# Patient Record
Sex: FEMALE | Race: OTHER
[De-identification: names, ages, dates, MRNs, and addresses within clinical notes are randomized per-mention and may not be internally consistent; named-entity substitution may affect disease eponyms.]

---

## 2017-01-17 PROBLEM — H43.10 VITREOUS HEMORRHAGE: Status: RESOLVED | Noted: 2017-01-17 | Resolved: 2017-01-17

## 2017-01-17 PROBLEM — Z86.711 HISTORY OF PULMONARY EMBOLISM: Status: RESOLVED | Noted: 2017-01-17 | Resolved: 2017-01-17

## 2017-07-17 ENCOUNTER — APPOINTMENT (OUTPATIENT)
Dept: RHEUMATOLOGY | Facility: CLINIC | Age: 47
End: 2017-07-17

## 2017-07-17 VITALS
BODY MASS INDEX: 23.32 KG/M2 | RESPIRATION RATE: 16 BRPM | HEART RATE: 90 BPM | TEMPERATURE: 98.8 F | SYSTOLIC BLOOD PRESSURE: 122 MMHG | WEIGHT: 140 LBS | HEIGHT: 65 IN | OXYGEN SATURATION: 95 % | DIASTOLIC BLOOD PRESSURE: 74 MMHG

## 2017-07-17 DIAGNOSIS — Z82.69 FAMILY HISTORY OF OTHER DISEASES OF THE MUSCULOSKELETAL SYSTEM AND CONNECTIVE TISSUE: ICD-10-CM

## 2017-07-17 DIAGNOSIS — M19.90 UNSPECIFIED OSTEOARTHRITIS, UNSPECIFIED SITE: ICD-10-CM

## 2017-07-17 RX ORDER — ASPIRIN 81 MG/1
81 TABLET ORAL
Refills: 0 | Status: ACTIVE | COMMUNITY
Start: 2017-07-17

## 2017-07-17 RX ORDER — DABIGATRAN ETEXILATE MESYLATE 110 MG/1
CAPSULE ORAL
Refills: 0 | Status: ACTIVE | COMMUNITY

## 2017-07-17 RX ORDER — FOLIC ACID 1 MG/1
1 TABLET ORAL
Refills: 0 | Status: ACTIVE | COMMUNITY
Start: 2017-07-17

## 2017-07-18 LAB
ALBUMIN SERPL ELPH-MCNC: 4.5 G/DL
ALP BLD-CCNC: 101 U/L
ALT SERPL-CCNC: 13 U/L
ANION GAP SERPL CALC-SCNC: 18 MMOL/L
APPEARANCE: CLEAR
AST SERPL-CCNC: 21 U/L
BACTERIA: NEGATIVE
BASOPHILS # BLD AUTO: 0.03 K/UL
BASOPHILS NFR BLD AUTO: 0.5 %
BILIRUB SERPL-MCNC: 1.6 MG/DL
BILIRUBIN URINE: NEGATIVE
BLOOD URINE: ABNORMAL
BUN SERPL-MCNC: 8 MG/DL
CALCIUM SERPL-MCNC: 9.5 MG/DL
CHLORIDE SERPL-SCNC: 104 MMOL/L
CO2 SERPL-SCNC: 22 MMOL/L
COLOR: YELLOW
CREAT SERPL-MCNC: 0.84 MG/DL
CREAT SPEC-SCNC: 136 MG/DL
CREAT/PROT UR: 0.2 RATIO
DSDNA AB SER-ACNC: <12 IU/ML
EOSINOPHIL # BLD AUTO: 0.23 K/UL
EOSINOPHIL NFR BLD AUTO: 3.5 %
GLUCOSE QUALITATIVE U: NORMAL MG/DL
GLUCOSE SERPL-MCNC: 101 MG/DL
HCT VFR BLD CALC: 29.1 %
HGB BLD-MCNC: 10.6 G/DL
HYALINE CASTS: 3 /LPF
IMM GRANULOCYTES NFR BLD AUTO: 0.2 %
KETONES URINE: NEGATIVE
LEUKOCYTE ESTERASE URINE: NEGATIVE
LYMPHOCYTES # BLD AUTO: 2.64 K/UL
LYMPHOCYTES NFR BLD AUTO: 39.8 %
MAN DIFF?: NORMAL
MCHC RBC-ENTMCNC: 33 PG
MCHC RBC-ENTMCNC: 36.4 GM/DL
MCV RBC AUTO: 90.7 FL
MICROSCOPIC-UA: NORMAL
MONOCYTES # BLD AUTO: 0.75 K/UL
MONOCYTES NFR BLD AUTO: 11.3 %
NEUTROPHILS # BLD AUTO: 2.97 K/UL
NEUTROPHILS NFR BLD AUTO: 44.7 %
NITRITE URINE: NEGATIVE
PH URINE: 6
PLATELET # BLD AUTO: 325 K/UL
POTASSIUM SERPL-SCNC: 4.3 MMOL/L
PROT SERPL-MCNC: 8.5 G/DL
PROT UR-MCNC: 26 MG/DL
PROTEIN URINE: 30 MG/DL
RBC # BLD: 3.21 M/UL
RBC # FLD: 14.4 %
RED BLOOD CELLS URINE: 4 /HPF
SODIUM SERPL-SCNC: 144 MMOL/L
SPECIFIC GRAVITY URINE: 1.02
SQUAMOUS EPITHELIAL CELLS: 2 /HPF
UROBILINOGEN URINE: 1 MG/DL
WBC # FLD AUTO: 6.63 K/UL
WHITE BLOOD CELLS URINE: 3 /HPF

## 2017-07-19 LAB
C3 SERPL-MCNC: 112 MG/DL
C4 SERPL-MCNC: 25 MG/DL

## 2017-09-26 ENCOUNTER — APPOINTMENT (OUTPATIENT)
Dept: PLASTIC SURGERY | Facility: CLINIC | Age: 47
End: 2017-09-26

## 2017-11-01 ENCOUNTER — APPOINTMENT (OUTPATIENT)
Dept: RHEUMATOLOGY | Facility: CLINIC | Age: 47
End: 2017-11-01
Payer: MEDICARE

## 2017-11-01 ENCOUNTER — LABORATORY RESULT (OUTPATIENT)
Age: 47
End: 2017-11-01

## 2017-11-01 VITALS
WEIGHT: 150 LBS | HEIGHT: 65 IN | OXYGEN SATURATION: 98 % | HEART RATE: 89 BPM | SYSTOLIC BLOOD PRESSURE: 115 MMHG | DIASTOLIC BLOOD PRESSURE: 72 MMHG | BODY MASS INDEX: 24.99 KG/M2 | TEMPERATURE: 98 F

## 2017-11-01 DIAGNOSIS — Z79.899 OTHER LONG TERM (CURRENT) DRUG THERAPY: ICD-10-CM

## 2017-11-01 PROCEDURE — 99214 OFFICE O/P EST MOD 30 MIN: CPT | Mod: 25

## 2017-11-01 PROCEDURE — 36415 COLL VENOUS BLD VENIPUNCTURE: CPT

## 2017-11-01 RX ORDER — CEPHALEXIN 250 MG/1
250 CAPSULE ORAL
Qty: 28 | Refills: 0 | Status: COMPLETED | COMMUNITY
Start: 2017-10-24

## 2017-11-01 RX ORDER — HYDROXYCHLOROQUINE SULFATE 200 MG/1
200 TABLET, FILM COATED ORAL DAILY
Qty: 180 | Refills: 3 | Status: ACTIVE | COMMUNITY
Start: 2017-07-17 | End: 1900-01-01

## 2017-11-02 LAB
ALBUMIN SERPL ELPH-MCNC: 4.4 G/DL
ALP BLD-CCNC: 119 U/L
ALT SERPL-CCNC: 30 U/L
ANION GAP SERPL CALC-SCNC: 16 MMOL/L
APPEARANCE: CLEAR
AST SERPL-CCNC: 43 U/L
BACTERIA: NEGATIVE
BASOPHILS # BLD AUTO: 0.03 K/UL
BASOPHILS NFR BLD AUTO: 0.4 %
BILIRUB SERPL-MCNC: 1.2 MG/DL
BILIRUBIN URINE: NEGATIVE
BLOOD URINE: NEGATIVE
BUN SERPL-MCNC: 10 MG/DL
C3 SERPL-MCNC: 121 MG/DL
C4 SERPL-MCNC: 26 MG/DL
CALCIUM SERPL-MCNC: 9.6 MG/DL
CHLORIDE SERPL-SCNC: 105 MMOL/L
CO2 SERPL-SCNC: 22 MMOL/L
COLOR: YELLOW
CREAT SERPL-MCNC: 0.76 MG/DL
CREAT SPEC-SCNC: 65 MG/DL
CREAT/PROT UR: 0.2 RATIO
DSDNA AB SER-ACNC: <12 IU/ML
EOSINOPHIL # BLD AUTO: 0.33 K/UL
EOSINOPHIL NFR BLD AUTO: 4.1 %
GLUCOSE QUALITATIVE U: NEGATIVE MG/DL
GLUCOSE SERPL-MCNC: 103 MG/DL
HCT VFR BLD CALC: 28 %
HGB BLD-MCNC: 10.2 G/DL
IMM GRANULOCYTES NFR BLD AUTO: 0.4 %
KETONES URINE: NEGATIVE
LEUKOCYTE ESTERASE URINE: NEGATIVE
LYMPHOCYTES # BLD AUTO: 2.62 K/UL
LYMPHOCYTES NFR BLD AUTO: 32.5 %
MAN DIFF?: NORMAL
MCHC RBC-ENTMCNC: 34.1 PG
MCHC RBC-ENTMCNC: 36.4 GM/DL
MCV RBC AUTO: 93.6 FL
MICROSCOPIC-UA: NORMAL
MONOCYTES # BLD AUTO: 1.01 K/UL
MONOCYTES NFR BLD AUTO: 12.5 %
NEUTROPHILS # BLD AUTO: 4.05 K/UL
NEUTROPHILS NFR BLD AUTO: 50.1 %
NITRITE URINE: NEGATIVE
PH URINE: 7
PLATELET # BLD AUTO: 299 K/UL
POTASSIUM SERPL-SCNC: 4.3 MMOL/L
PROT SERPL-MCNC: 8.5 G/DL
PROT UR-MCNC: 12 MG/DL
PROTEIN URINE: NEGATIVE MG/DL
RBC # BLD: 2.99 M/UL
RBC # FLD: 14.8 %
RED BLOOD CELLS URINE: 2 /HPF
SODIUM SERPL-SCNC: 143 MMOL/L
SPECIFIC GRAVITY URINE: 1.01
SQUAMOUS EPITHELIAL CELLS: 2 /HPF
UROBILINOGEN URINE: 1 MG/DL
WBC # FLD AUTO: 8.07 K/UL
WHITE BLOOD CELLS URINE: 2 /HPF

## 2018-01-25 ENCOUNTER — LABORATORY RESULT (OUTPATIENT)
Age: 48
End: 2018-01-25

## 2018-01-25 ENCOUNTER — APPOINTMENT (OUTPATIENT)
Dept: RHEUMATOLOGY | Facility: CLINIC | Age: 48
End: 2018-01-25
Payer: MEDICARE

## 2018-01-25 VITALS
HEIGHT: 65 IN | TEMPERATURE: 97.6 F | DIASTOLIC BLOOD PRESSURE: 81 MMHG | HEART RATE: 101 BPM | BODY MASS INDEX: 25.49 KG/M2 | SYSTOLIC BLOOD PRESSURE: 134 MMHG | RESPIRATION RATE: 16 BRPM | WEIGHT: 153 LBS | OXYGEN SATURATION: 98 %

## 2018-01-25 PROCEDURE — 36415 COLL VENOUS BLD VENIPUNCTURE: CPT

## 2018-01-25 PROCEDURE — 99215 OFFICE O/P EST HI 40 MIN: CPT | Mod: 25

## 2018-01-30 LAB
ALBUMIN SERPL ELPH-MCNC: 4.3 G/DL
ALP BLD-CCNC: 89 U/L
ALT SERPL-CCNC: 10 U/L
ANA SER IF-ACNC: NEGATIVE
ANION GAP SERPL CALC-SCNC: 15 MMOL/L
APTT BLD: 48.4 SEC
AST SERPL-CCNC: 13 U/L
B2 GLYCOPROT1 IGA SERPL IA-ACNC: <5 SAU
B2 GLYCOPROT1 IGG SER-ACNC: <5 SGU
B2 GLYCOPROT1 IGM SER-ACNC: <5 SMU
B2 MICROGLOB SERPL-MCNC: 1.9 MG/L
BASOPHILS # BLD AUTO: 0.03 K/UL
BASOPHILS NFR BLD AUTO: 0.3 %
BILIRUB SERPL-MCNC: 1.1 MG/DL
BUN SERPL-MCNC: 12 MG/DL
C3 SERPL-MCNC: 133 MG/DL
C4 SERPL-MCNC: 23 MG/DL
CALCIUM SERPL-MCNC: 9.3 MG/DL
CARDIOLIPIN AB SER IA-ACNC: NEGATIVE
CCP AB SER IA-ACNC: <8 UNITS
CH50 SERPL-MCNC: 63 U/ML
CHLORIDE SERPL-SCNC: 103 MMOL/L
CK SERPL-CCNC: 58 U/L
CO2 SERPL-SCNC: 22 MMOL/L
CONFIRM: 59 SEC
CREAT SERPL-MCNC: 0.88 MG/DL
DRVVT 1H NP PPP: 51.8 SEC
DRVVT IMM 1:2 NP PPP: NORMAL
DRVVT SCREEN TO CONFIRM RATIO: 0.88 RATIO
DSDNA AB SER-ACNC: <12 IU/ML
ENA RNP AB SER IA-ACNC: <0.2 AL
ENA SCL70 IGG SER IA-ACNC: <0.2 AL
ENA SM AB SER IA-ACNC: <0.2 AL
ENA SS-A AB SER IA-ACNC: <0.2 AL
ENA SS-B AB SER IA-ACNC: <0.2 AL
EOSINOPHIL # BLD AUTO: 0.52 K/UL
EOSINOPHIL NFR BLD AUTO: 6 %
ERYTHROCYTE [SEDIMENTATION RATE] IN BLOOD BY WESTERGREN METHOD: 19 MM/HR
GLUCOSE SERPL-MCNC: 93 MG/DL
HCT VFR BLD CALC: 30.5 %
HGB BLD-MCNC: 11.2 G/DL
IMM GRANULOCYTES NFR BLD AUTO: 0.3 %
LYMPHOCYTES # BLD AUTO: 3.31 K/UL
LYMPHOCYTES NFR BLD AUTO: 38.5 %
MAN DIFF?: NORMAL
MCHC RBC-ENTMCNC: 34 PG
MCHC RBC-ENTMCNC: 36.7 GM/DL
MCV RBC AUTO: 92.7 FL
MONOCYTES # BLD AUTO: 0.97 K/UL
MONOCYTES NFR BLD AUTO: 11.3 %
MPO AB + PR3 PNL SER: NORMAL
NEUTROPHILS # BLD AUTO: 3.74 K/UL
NEUTROPHILS NFR BLD AUTO: 43.6 %
PLATELET # BLD AUTO: 334 K/UL
POTASSIUM SERPL-SCNC: 4.2 MMOL/L
PROT SERPL-MCNC: 8.7 G/DL
RBC # BLD: 3.29 M/UL
RBC # FLD: 14.2 %
RF+CCP IGG SER-IMP: NEGATIVE
RHEUMATOID FACT SER QL: <7 IU/ML
SCREEN DRVVT: 60.2 SEC
SILICA CLOTTING TIME INTERPRETATION: NORMAL
SILICA CLOTTING TIME S/C: 0.73 RATIO
SODIUM SERPL-SCNC: 140 MMOL/L
T PALLIDUM AB SER QL IA: NEGATIVE
WBC # FLD AUTO: 8.6 K/UL

## 2018-01-31 LAB — HISTONE AB SER QL: 0.7 UNITS

## 2018-02-07 ENCOUNTER — APPOINTMENT (OUTPATIENT)
Dept: RHEUMATOLOGY | Facility: CLINIC | Age: 48
End: 2018-02-07
Payer: MEDICARE

## 2018-02-07 VITALS
WEIGHT: 156 LBS | OXYGEN SATURATION: 99 % | DIASTOLIC BLOOD PRESSURE: 70 MMHG | TEMPERATURE: 97.6 F | BODY MASS INDEX: 25.96 KG/M2 | SYSTOLIC BLOOD PRESSURE: 125 MMHG | HEART RATE: 103 BPM

## 2018-02-07 DIAGNOSIS — M32.9 SYSTEMIC LUPUS ERYTHEMATOSUS, UNSPECIFIED: ICD-10-CM

## 2018-02-07 DIAGNOSIS — Z23 ENCOUNTER FOR IMMUNIZATION: ICD-10-CM

## 2018-02-07 DIAGNOSIS — M12.00 CHRONIC POSTRHEUMATIC ARTHROPATHY [JACCOUD], UNSPECIFIED SITE: ICD-10-CM

## 2018-02-07 PROCEDURE — 99214 OFFICE O/P EST MOD 30 MIN: CPT | Mod: 25

## 2018-02-07 PROCEDURE — G0009: CPT

## 2018-02-07 PROCEDURE — 90670 PCV13 VACCINE IM: CPT

## 2018-03-08 ENCOUNTER — APPOINTMENT (OUTPATIENT)
Dept: RHEUMATOLOGY | Facility: CLINIC | Age: 48
End: 2018-03-08
Payer: MEDICARE

## 2018-03-08 VITALS
DIASTOLIC BLOOD PRESSURE: 89 MMHG | HEIGHT: 65 IN | HEART RATE: 99 BPM | SYSTOLIC BLOOD PRESSURE: 148 MMHG | WEIGHT: 152.13 LBS | BODY MASS INDEX: 25.34 KG/M2 | OXYGEN SATURATION: 98 % | TEMPERATURE: 98.2 F | RESPIRATION RATE: 16 BRPM

## 2018-03-08 DIAGNOSIS — D68.61 ANTIPHOSPHOLIPID SYNDROME: ICD-10-CM

## 2018-03-08 DIAGNOSIS — M15.9 POLYOSTEOARTHRITIS, UNSPECIFIED: ICD-10-CM

## 2018-03-08 PROCEDURE — 99214 OFFICE O/P EST MOD 30 MIN: CPT

## 2018-05-09 ENCOUNTER — APPOINTMENT (OUTPATIENT)
Dept: RHEUMATOLOGY | Facility: CLINIC | Age: 48
End: 2018-05-09

## 2018-06-21 ENCOUNTER — APPOINTMENT (OUTPATIENT)
Dept: RHEUMATOLOGY | Facility: CLINIC | Age: 48
End: 2018-06-21
Payer: MEDICARE

## 2018-06-21 VITALS
WEIGHT: 150 LBS | HEART RATE: 98 BPM | DIASTOLIC BLOOD PRESSURE: 83 MMHG | TEMPERATURE: 98.5 F | RESPIRATION RATE: 16 BRPM | OXYGEN SATURATION: 98 % | HEIGHT: 65 IN | BODY MASS INDEX: 24.99 KG/M2 | SYSTOLIC BLOOD PRESSURE: 129 MMHG

## 2018-06-21 PROCEDURE — 99214 OFFICE O/P EST MOD 30 MIN: CPT

## 2018-11-01 ENCOUNTER — CHART COPY (OUTPATIENT)
Age: 48
End: 2018-11-01

## 2018-11-06 RX ORDER — MYCOPHENOLATE MOFETIL 500 MG/1
500 TABLET ORAL
Qty: 270 | Refills: 3 | Status: ACTIVE | COMMUNITY
Start: 2018-01-25 | End: 1900-01-01

## 2019-01-23 ENCOUNTER — APPOINTMENT (OUTPATIENT)
Dept: RHEUMATOLOGY | Facility: CLINIC | Age: 49
End: 2019-01-23
Payer: MEDICARE

## 2019-01-23 VITALS
WEIGHT: 156 LBS | BODY MASS INDEX: 25.99 KG/M2 | HEART RATE: 85 BPM | TEMPERATURE: 98.6 F | OXYGEN SATURATION: 98 % | DIASTOLIC BLOOD PRESSURE: 73 MMHG | SYSTOLIC BLOOD PRESSURE: 107 MMHG | HEIGHT: 65 IN

## 2019-01-23 PROCEDURE — 99214 OFFICE O/P EST MOD 30 MIN: CPT

## 2019-01-23 NOTE — HISTORY OF PRESENT ILLNESS
[FreeTextEntry1] : Patient arrives for follow up visit. Hx of Lupus. Patient reports of right knee pain and swelling x 3 weeks after taking Kathia class, PCP referred for MRI and US, states will have done either tomorrow or Friday for knee. Mild swelling compared to left knee.Patient states would like to discuss receiving a cortisone shot to right knee. Patient currently reports body aches 7/10 pain, reports complaints of monthly body aches "about once a month" but able to manage pain by resting.\par \par Still on plaquenil 200 bid and Cellcept 1500 mg. Patient still working as , very good response to meds. Never had hair implants scalp.

## 2019-01-23 NOTE — PHYSICAL EXAM
[General Appearance - Alert] : alert [General Appearance - In No Acute Distress] : in no acute distress [Sclera] : the sclera and conjunctiva were normal [PERRL With Normal Accommodation] : pupils were equal in size, round, and reactive to light [Extraocular Movements] : extraocular movements were intact [Oriented To Time, Place, And Person] : oriented to person, place, and time [Impaired Insight] : insight and judgment were intact [Affect] : the affect was normal [FreeTextEntry1] : R KNEE SLI WARM, SLI SWOLLEN, NO FLUID, NO TENDERNESS TO PALPATION.

## 2019-01-23 NOTE — ASSESSMENT
[FreeTextEntry1] : UCTD, with minor occ flares for several hours monthly, rest is all that’s needed for relief\par ? OA, ? meniscus tear R knee\par \par PLAN\par \par Continue meds as is, no change\par Inject R knee if OA on MRI, schedule her in when nurse here.

## 2019-04-03 ENCOUNTER — APPOINTMENT (OUTPATIENT)
Dept: RHEUMATOLOGY | Facility: CLINIC | Age: 49
End: 2019-04-03
Payer: COMMERCIAL

## 2019-04-03 VITALS
OXYGEN SATURATION: 98 % | TEMPERATURE: 98.6 F | BODY MASS INDEX: 25.33 KG/M2 | HEIGHT: 65 IN | DIASTOLIC BLOOD PRESSURE: 79 MMHG | WEIGHT: 152 LBS | SYSTOLIC BLOOD PRESSURE: 119 MMHG | HEART RATE: 94 BPM

## 2019-04-03 PROCEDURE — 99214 OFFICE O/P EST MOD 30 MIN: CPT

## 2019-10-03 ENCOUNTER — APPOINTMENT (OUTPATIENT)
Dept: RHEUMATOLOGY | Facility: CLINIC | Age: 49
End: 2019-10-03
Payer: MEDICARE

## 2019-10-03 VITALS
HEIGHT: 65 IN | OXYGEN SATURATION: 97 % | WEIGHT: 154 LBS | TEMPERATURE: 99.1 F | HEART RATE: 93 BPM | SYSTOLIC BLOOD PRESSURE: 136 MMHG | DIASTOLIC BLOOD PRESSURE: 83 MMHG | BODY MASS INDEX: 25.66 KG/M2

## 2019-10-03 DIAGNOSIS — M35.9 SYSTEMIC INVOLVEMENT OF CONNECTIVE TISSUE, UNSPECIFIED: ICD-10-CM

## 2019-10-03 PROCEDURE — 99214 OFFICE O/P EST MOD 30 MIN: CPT

## 2019-10-03 NOTE — PHYSICAL EXAM
[General Appearance - Alert] : alert [General Appearance - In No Acute Distress] : in no acute distress [Sclera] : the sclera and conjunctiva were normal [PERRL With Normal Accommodation] : pupils were equal in size, round, and reactive to light [Extraocular Movements] : extraocular movements were intact [Skin Color & Pigmentation] : normal skin color and pigmentation [Skin Turgor] : normal skin turgor [] : no rash [Oriented To Time, Place, And Person] : oriented to person, place, and time [Impaired Insight] : insight and judgment were intact [Affect] : the affect was normal [FreeTextEntry1] : KNEES COOL, NO INFLAM. SOME ATROPHY LAT QUAD R.

## 2019-10-03 NOTE — PHYSICAL EXAM
[General Appearance - Alert] : alert [Sclera] : the sclera and conjunctiva were normal [General Appearance - In No Acute Distress] : in no acute distress [PERRL With Normal Accommodation] : pupils were equal in size, round, and reactive to light [Extraocular Movements] : extraocular movements were intact [Skin Color & Pigmentation] : normal skin color and pigmentation [Skin Turgor] : normal skin turgor [] : no rash [Oriented To Time, Place, And Person] : oriented to person, place, and time [Affect] : the affect was normal [Impaired Insight] : insight and judgment were intact [FreeTextEntry1] : KNEES COOL, NO INFLAM. SOME ATROPHY LAT QUAD R.

## 2019-10-03 NOTE — ASSESSMENT
[FreeTextEntry1] : SLE\par OA\par \par PLAN\par \par PT to legs to strengthen LE's.\par Continue meds\par See ophthal. yearly\par See in Fall.\par

## 2019-10-03 NOTE — HISTORY OF PRESENT ILLNESS
[FreeTextEntry1] : SLE and OA R knee with MRI showing OA changes and med and lat horn mensicus tears.\par \par Cellcept 1500mg and Plaquenil 200bid. No real complaints.

## 2020-04-09 ENCOUNTER — APPOINTMENT (OUTPATIENT)
Dept: RHEUMATOLOGY | Facility: CLINIC | Age: 50
End: 2020-04-09

## 2020-10-22 ENCOUNTER — APPOINTMENT (OUTPATIENT)
Dept: OTOLARYNGOLOGY | Facility: CLINIC | Age: 50
End: 2020-10-22
Payer: MEDICARE

## 2020-10-22 VITALS — WEIGHT: 155 LBS | BODY MASS INDEX: 25.83 KG/M2 | HEIGHT: 65 IN

## 2020-10-22 DIAGNOSIS — H90.5 UNSPECIFIED SENSORINEURAL HEARING LOSS: ICD-10-CM

## 2020-10-22 DIAGNOSIS — T16.2XXA FOREIGN BODY IN LEFT EAR, INITIAL ENCOUNTER: ICD-10-CM

## 2020-10-22 PROCEDURE — 92557 COMPREHENSIVE HEARING TEST: CPT

## 2020-10-22 PROCEDURE — 69801 INCISE INNER EAR: CPT

## 2020-10-22 PROCEDURE — 92550 TYMPANOMETRY & REFLEX THRESH: CPT

## 2020-10-22 PROCEDURE — 99204 OFFICE O/P NEW MOD 45 MIN: CPT | Mod: 25

## 2020-10-22 RX ORDER — PREDNISONE 10 MG/1
10 TABLET ORAL
Qty: 120 | Refills: 0 | Status: ACTIVE | COMMUNITY
Start: 2020-10-22 | End: 1900-01-01

## 2020-10-22 NOTE — PROCEDURE
[Sudden Hearing Loss] : Sudden Hearing Loss [Same] : same as the Pre Op Dx. [] : Intratympanic Therapy [FreeTextEntry4] : phenol

## 2020-10-22 NOTE — HISTORY OF PRESENT ILLNESS
[de-identified] : 10/22/2020 Patient presents today c/o ear wax with hearing loss in left ear. Pt went to see PCP Dr Araujo she gave her drops which didn't  help . Pt denies any issue with the ears. Patient states no otalgia but she does feel pressure in the right. Pt States there was some tinnitus but has subsided for now \par \par Patient unsure if lupus has anything to do with hearing loss.

## 2020-10-29 ENCOUNTER — APPOINTMENT (OUTPATIENT)
Dept: OTOLARYNGOLOGY | Facility: CLINIC | Age: 50
End: 2020-10-29

## 2020-11-05 ENCOUNTER — APPOINTMENT (OUTPATIENT)
Dept: OTOLARYNGOLOGY | Facility: CLINIC | Age: 50
End: 2020-11-05

## 2020-12-17 ENCOUNTER — APPOINTMENT (OUTPATIENT)
Dept: OTOLARYNGOLOGY | Facility: CLINIC | Age: 50
End: 2020-12-17

## 2021-09-05 ENCOUNTER — INPATIENT (INPATIENT)
Facility: HOSPITAL | Age: 51
LOS: 10 days | Discharge: HOME | End: 2021-09-16
Attending: INTERNAL MEDICINE | Admitting: INTERNAL MEDICINE
Payer: MEDICARE

## 2021-09-05 VITALS
RESPIRATION RATE: 17 BRPM | OXYGEN SATURATION: 96 % | DIASTOLIC BLOOD PRESSURE: 81 MMHG | TEMPERATURE: 98 F | SYSTOLIC BLOOD PRESSURE: 123 MMHG | HEART RATE: 121 BPM

## 2021-09-05 DIAGNOSIS — D57.1 SICKLE-CELL DISEASE WITHOUT CRISIS: ICD-10-CM

## 2021-09-05 DIAGNOSIS — M54.9 DORSALGIA, UNSPECIFIED: ICD-10-CM

## 2021-09-05 DIAGNOSIS — M32.9 SYSTEMIC LUPUS ERYTHEMATOSUS, UNSPECIFIED: ICD-10-CM

## 2021-09-05 DIAGNOSIS — M54.5 LOW BACK PAIN: ICD-10-CM

## 2021-09-05 DIAGNOSIS — R63.8 OTHER SYMPTOMS AND SIGNS CONCERNING FOOD AND FLUID INTAKE: ICD-10-CM

## 2021-09-05 DIAGNOSIS — Z86.711 PERSONAL HISTORY OF PULMONARY EMBOLISM: ICD-10-CM

## 2021-09-05 DIAGNOSIS — R74.01 ELEVATION OF LEVELS OF LIVER TRANSAMINASE LEVELS: ICD-10-CM

## 2021-09-05 LAB
ALBUMIN SERPL ELPH-MCNC: 4.9 G/DL — SIGNIFICANT CHANGE UP (ref 3.3–5)
ALP SERPL-CCNC: 175 U/L — HIGH (ref 40–120)
ALT FLD-CCNC: 27 U/L — SIGNIFICANT CHANGE UP (ref 4–33)
ANION GAP SERPL CALC-SCNC: 18 MMOL/L — HIGH (ref 7–14)
APPEARANCE UR: CLEAR — SIGNIFICANT CHANGE UP
AST SERPL-CCNC: 64 U/L — HIGH (ref 4–32)
BACTERIA # UR AUTO: NEGATIVE — SIGNIFICANT CHANGE UP
BASOPHILS # BLD AUTO: 0 K/UL — SIGNIFICANT CHANGE UP (ref 0–0.2)
BASOPHILS NFR BLD AUTO: 0 % — SIGNIFICANT CHANGE UP (ref 0–2)
BILIRUB SERPL-MCNC: 2.4 MG/DL — HIGH (ref 0.2–1.2)
BILIRUB UR-MCNC: NEGATIVE — SIGNIFICANT CHANGE UP
BLD GP AB SCN SERPL QL: NEGATIVE — SIGNIFICANT CHANGE UP
BUN SERPL-MCNC: 13 MG/DL — SIGNIFICANT CHANGE UP (ref 7–23)
CALCIUM SERPL-MCNC: 9.8 MG/DL — SIGNIFICANT CHANGE UP (ref 8.4–10.5)
CHLORIDE SERPL-SCNC: 99 MMOL/L — SIGNIFICANT CHANGE UP (ref 98–107)
CO2 SERPL-SCNC: 20 MMOL/L — LOW (ref 22–31)
COLOR SPEC: YELLOW — SIGNIFICANT CHANGE UP
CREAT SERPL-MCNC: 0.82 MG/DL — SIGNIFICANT CHANGE UP (ref 0.5–1.3)
DIFF PNL FLD: NEGATIVE — SIGNIFICANT CHANGE UP
EOSINOPHIL # BLD AUTO: 0 K/UL — SIGNIFICANT CHANGE UP (ref 0–0.5)
EOSINOPHIL NFR BLD AUTO: 0 % — SIGNIFICANT CHANGE UP (ref 0–6)
EPI CELLS # UR: 0 /HPF — SIGNIFICANT CHANGE UP (ref 0–5)
GLUCOSE SERPL-MCNC: 145 MG/DL — HIGH (ref 70–99)
GLUCOSE UR QL: NEGATIVE — SIGNIFICANT CHANGE UP
HCT VFR BLD CALC: 28.8 % — LOW (ref 34.5–45)
HGB BLD-MCNC: 9.6 G/DL — LOW (ref 11.5–15.5)
IANC: 9.65 K/UL — HIGH (ref 1.5–8.5)
KETONES UR-MCNC: ABNORMAL
LEUKOCYTE ESTERASE UR-ACNC: NEGATIVE — SIGNIFICANT CHANGE UP
LYMPHOCYTES # BLD AUTO: 0.5 K/UL — LOW (ref 1–3.3)
LYMPHOCYTES # BLD AUTO: 4.5 % — LOW (ref 13–44)
MACROCYTES BLD QL: SIGNIFICANT CHANGE UP
MANUAL SMEAR VERIFICATION: SIGNIFICANT CHANGE UP
MCHC RBC-ENTMCNC: 30.6 PG — SIGNIFICANT CHANGE UP (ref 27–34)
MCHC RBC-ENTMCNC: 33.3 GM/DL — SIGNIFICANT CHANGE UP (ref 32–36)
MCV RBC AUTO: 91.4 FL — SIGNIFICANT CHANGE UP (ref 80–100)
MONOCYTES # BLD AUTO: 0.5 K/UL — SIGNIFICANT CHANGE UP (ref 0–0.9)
MONOCYTES NFR BLD AUTO: 4.5 % — SIGNIFICANT CHANGE UP (ref 2–14)
MYELOCYTES NFR BLD: 0.9 % — HIGH (ref 0–0)
NEUTROPHILS # BLD AUTO: 9.48 K/UL — HIGH (ref 1.8–7.4)
NEUTROPHILS NFR BLD AUTO: 84.8 % — HIGH (ref 43–77)
NITRITE UR-MCNC: NEGATIVE — SIGNIFICANT CHANGE UP
NRBC # BLD: 29 /100 — HIGH (ref 0–0)
PH UR: 6.5 — SIGNIFICANT CHANGE UP (ref 5–8)
PLAT MORPH BLD: NORMAL — SIGNIFICANT CHANGE UP
PLATELET # BLD AUTO: 302 K/UL — SIGNIFICANT CHANGE UP (ref 150–400)
PLATELET COUNT - ESTIMATE: NORMAL — SIGNIFICANT CHANGE UP
POLYCHROMASIA BLD QL SMEAR: SLIGHT — SIGNIFICANT CHANGE UP
POTASSIUM SERPL-MCNC: 5.4 MMOL/L — HIGH (ref 3.5–5.3)
POTASSIUM SERPL-SCNC: 5.4 MMOL/L — HIGH (ref 3.5–5.3)
PROT SERPL-MCNC: 8.8 G/DL — HIGH (ref 6–8.3)
PROT UR-MCNC: ABNORMAL
RBC # BLD: 3.15 M/UL — LOW (ref 3.8–5.2)
RBC # FLD: 13.8 % — SIGNIFICANT CHANGE UP (ref 10.3–14.5)
RBC BLD AUTO: NORMAL — SIGNIFICANT CHANGE UP
RBC CASTS # UR COMP ASSIST: 1 /HPF — SIGNIFICANT CHANGE UP (ref 0–4)
RH IG SCN BLD-IMP: NEGATIVE — SIGNIFICANT CHANGE UP
SARS-COV-2 RNA SPEC QL NAA+PROBE: SIGNIFICANT CHANGE UP
SODIUM SERPL-SCNC: 137 MMOL/L — SIGNIFICANT CHANGE UP (ref 135–145)
SP GR SPEC: >1.05 (ref 1–1.05)
TARGETS BLD QL SMEAR: SLIGHT — SIGNIFICANT CHANGE UP
UROBILINOGEN FLD QL: ABNORMAL
VARIANT LYMPHS # BLD: 5.3 % — SIGNIFICANT CHANGE UP (ref 0–6)
WBC # BLD: 11.18 K/UL — HIGH (ref 3.8–10.5)
WBC # FLD AUTO: 11.18 K/UL — HIGH (ref 3.8–10.5)
WBC UR QL: 3 /HPF — SIGNIFICANT CHANGE UP (ref 0–5)

## 2021-09-05 PROCEDURE — 99285 EMERGENCY DEPT VISIT HI MDM: CPT

## 2021-09-05 PROCEDURE — 74177 CT ABD & PELVIS W/CONTRAST: CPT | Mod: 26

## 2021-09-05 PROCEDURE — 99223 1ST HOSP IP/OBS HIGH 75: CPT

## 2021-09-05 PROCEDURE — 72132 CT LUMBAR SPINE W/DYE: CPT | Mod: 26

## 2021-09-05 RX ORDER — ONDANSETRON 8 MG/1
4 TABLET, FILM COATED ORAL EVERY 8 HOURS
Refills: 0 | Status: DISCONTINUED | OUTPATIENT
Start: 2021-09-05 | End: 2021-09-16

## 2021-09-05 RX ORDER — LANOLIN ALCOHOL/MO/W.PET/CERES
3 CREAM (GRAM) TOPICAL AT BEDTIME
Refills: 0 | Status: DISCONTINUED | OUTPATIENT
Start: 2021-09-05 | End: 2021-09-16

## 2021-09-05 RX ORDER — MYCOPHENOLIC ACID 180 MG/1
0 TABLET, DELAYED RELEASE ORAL
Qty: 0 | Refills: 0 | DISCHARGE

## 2021-09-05 RX ORDER — LIDOCAINE 4 G/100G
1 CREAM TOPICAL ONCE
Refills: 0 | Status: COMPLETED | OUTPATIENT
Start: 2021-09-05 | End: 2021-09-05

## 2021-09-05 RX ORDER — ACETAMINOPHEN 500 MG
650 TABLET ORAL EVERY 6 HOURS
Refills: 0 | Status: DISCONTINUED | OUTPATIENT
Start: 2021-09-05 | End: 2021-09-07

## 2021-09-05 RX ORDER — SODIUM CHLORIDE 9 MG/ML
1000 INJECTION INTRAMUSCULAR; INTRAVENOUS; SUBCUTANEOUS
Refills: 0 | Status: DISCONTINUED | OUTPATIENT
Start: 2021-09-05 | End: 2021-09-10

## 2021-09-05 RX ORDER — LIDOCAINE 4 G/100G
1 CREAM TOPICAL DAILY
Refills: 0 | Status: DISCONTINUED | OUTPATIENT
Start: 2021-09-05 | End: 2021-09-16

## 2021-09-05 RX ORDER — KETOROLAC TROMETHAMINE 30 MG/ML
15 SYRINGE (ML) INJECTION ONCE
Refills: 0 | Status: DISCONTINUED | OUTPATIENT
Start: 2021-09-05 | End: 2021-09-05

## 2021-09-05 RX ORDER — SODIUM CHLORIDE 9 MG/ML
1000 INJECTION INTRAMUSCULAR; INTRAVENOUS; SUBCUTANEOUS ONCE
Refills: 0 | Status: COMPLETED | OUTPATIENT
Start: 2021-09-05 | End: 2021-09-05

## 2021-09-05 RX ORDER — ACETAMINOPHEN 500 MG
650 TABLET ORAL ONCE
Refills: 0 | Status: COMPLETED | OUTPATIENT
Start: 2021-09-05 | End: 2021-09-05

## 2021-09-05 RX ADMIN — Medication 650 MILLIGRAM(S): at 13:02

## 2021-09-05 RX ADMIN — Medication 15 MILLIGRAM(S): at 17:18

## 2021-09-05 RX ADMIN — SODIUM CHLORIDE 1000 MILLILITER(S): 9 INJECTION INTRAMUSCULAR; INTRAVENOUS; SUBCUTANEOUS at 13:50

## 2021-09-05 RX ADMIN — LIDOCAINE 1 PATCH: 4 CREAM TOPICAL at 18:05

## 2021-09-05 NOTE — H&P ADULT - PROBLEM SELECTOR PLAN 3
New  No record to compare to  s/p cholecystectomy  Tbili 2.4, , AST 64, ALT 24 New  No record to compare to patient reports chronically elevated  s/p cholecystectomy  Tbili 2.4, , AST 64, ALT 24  Will repeat to assure not trending up

## 2021-09-05 NOTE — ED PROVIDER NOTE - NS ED ROS FT
CONST: no fevers, no chills  EYES: no pain, no vision changes  ENT: no sore throat, no ear pain, no change in hearing  CV: no chest pain, no leg swelling  RESP: no shortness of breath, no cough  ABD: Pos abdominal pain localized to the sides. no nausea, no vomiting, no diarrhea  : no dysuria, no flank pain, no hematuria. Pos decreased urine output.  MSK: Pos lower back pain  NEURO: no headache or additional neurologic complaints  SKIN:  no rash

## 2021-09-05 NOTE — H&P ADULT - NSHPPHYSICALEXAM_GEN_ALL_CORE
PHYSICAL EXAM:  GENERAL: NAD, well-developed, well-nourished  HEAD:  Atraumatic, Normocephalic  EYES: EOMI, PERRL, conjunctiva and sclera clear  NECK: Supple, No JVD  CHEST/LUNG: Clear to auscultation bilaterally; No wheezes, rales or rhonchi  HEART: Regular rate and rhythm; No murmurs, rubs, or gallops, (+)S1, S2  ABDOMEN: Soft, Nontender, Nondistended; Normal Bowel sounds   EXTREMITIES:  2+ Peripheral Pulses, No clubbing, cyanosis, or edema  PSYCH: normal mood and affect  NEUROLOGY: AAOx3, non-focal  MSK: SLR >60 bilaterally with no pain, sacroilliac tenderness  SKIN: No rashes or lesions

## 2021-09-05 NOTE — H&P ADULT - PROBLEM SELECTOR PLAN 4
Chronic stable  Continue cellcept and prednisone 5mg daily Chronic stable  Continue cellcept and prednisone 5mg daily following confirmation of dosing

## 2021-09-05 NOTE — ED ADULT TRIAGE NOTE - CHIEF COMPLAINT QUOTE
Pt c/o lower back pian and urinary retention. Pt last urinated last night. PMH Lupus, PE on Coumadin, Sickle Cell Disease. Pt tachycardic in triage, c/o mild cp

## 2021-09-05 NOTE — ED PROVIDER NOTE - OBJECTIVE STATEMENT
50 yo female pt PMH Lupus, SC, hx of PEs; who complains of lower back pain and poor urine output. Lower back pain present since 1 week ago and has been worsening since. Patient refers poor urine output since yesterday. Denies chest pain, SOB, n/v, diarrhea, abd pain, fevers, . Pos for confusion/feeling disoriented and weight loss s/p cholecystectomy in july. No urine symptoms such as dysuria or frequency. No loss of sensation, numbness or tingling. 50 yo female pt PMH Lupus, SC, hx of PEs; who complains of lower back pain and poor urine output secondary to dehydration. Lower back pain present since 1 week ago and has been worsening since. Patient refers poor urine output since yesterday. Denies chest pain, SOB, n/v, diarrhea, abd pain, fevers, . Pos for confusion/feeling disoriented and weight loss s/p cholecystectomy in july. No urine symptoms such as dysuria or frequency. No loss of sensation, numbness or tingling.

## 2021-09-05 NOTE — ED PROVIDER NOTE - ATTENDING CONTRIBUTION TO CARE
I have personally performed a face to face bedside history and physical examination of this patient. I have discussed the history, examination, review of systems, assessment and plan of management with the resident. I have reviewed the electronic medical record and amended it to reflect my history, review of systems, physical exam, assessment and plan.    Brief HPI:  50 yo F pt PMH Lupus, SC, hx of PEs; who complains of lower back pain and poor urine output secondary to dehydration.  Patient states she developed lumbar back pain ~1 week ago.  It is non-radiating, not associated with numbness, weakness, tingling.  Also with lower abdominal pain.  States she is having decreased urine output, no urge no urinate.  Normal bm's.  Denies fever, chills, trauma, pelvic sensory changes, recent spinal instrumentation.      Vitals:   Reviewed    Exam:    GEN:  Non-toxic appearing, non-distressed, speaking full sentences, non-diaphoretic, AAOx3  HEENT:  NCAT, neck supple, EOMI, PERRLA, sclera anicteric, no conjunctival pallor or injection, no stridor, normal voice, no tonsillar exudate, uvula midline,  CV:  regular rhythm and rate, s1/s2 audible, no murmurs, rubs or gallops, peripheral pulses 2+ and symmetric  PULM:  non-labored respirations, lungs clear to auscultation bilaterally, no wheezes, crackles or rales  ABD:  non distended, mild diffuse tenderness, no rebound, no guarding, negative Sanford's sign, bowel sounds normal, no cvat  MSK:  no gross deformity, non-tender extremities and joints, range of motion grossly normal appearing, no extremity edema, extremities warm and well perfused   NEURO:  AAOx3, CN II-XII intact, motor 5/5 in upper and lower extremities bilaterally, sensation grossly intact in extremities and trunk, finger to nose testing wnl, no nystagmus, negative Romberg, no pronator drift, shuffling gait   SPINE:  b/l SI joint tenderness, no midline c/t/l spine tenderness   SKIN:  warm, dry, no rash or vesicles     A/P:  50 yo F pt PMH Lupus, SC, hx of PEs; who complains of lower back pain and poor urine output secondary.  Low concern for spinal epidural abscess, transverse myelitis, acute cord compression, or cauda equina syndrome at this time.  The patient possesses no red flags including fever, chills, history of malignancy, history of intravenous drug use, recent spinal instrumentation, predominant nocturnal pain, history of immunosuppression, pelvic or perineal anesthesia or paresthesia, or fecal or urinary incontinence or retention.  Despite reported decreased urination, there is no evidence of retention on exam such as suprapubic tenderness or distention.  Possible infection or inflammatory condition of abdomen, uti, pyelo, sacroiliitis.  Plan for labs, ct, supportive care.  Dispo pending.

## 2021-09-05 NOTE — ED PROVIDER NOTE - PHYSICAL EXAMINATION
GENERAL: Awake, alert, in acute pain  HEENT: NC/AT, moist mucous membranes  LUNGS: CTAB, no wheezes or crackles   CARDIAC: RRR, no m/r/g  ABDOMEN: Soft, normal BS, non tender, non distended, no rebound, no guarding  BACK: midline and paraspinal lower back tenderness. No stepoffs. Pos leg raise bilaterally.   EXT: No edema, no calf tenderness, 2+ DP pulses bilaterally, no deformities.  NEURO: A&Ox3. Moving all extremities. ROM of lower ext limited due to pain  SKIN: Warm and dry. GENERAL: Awake, alert, in acute pain  HEENT: NC/AT, moist mucous membranes  LUNGS: CTAB, no wheezes or crackles   CARDIAC: RRR, no m/r/g  ABDOMEN: Soft, normal BS, non tender, non distended, no rebound, no guarding  BACK: no midline tenderness. lower lumbar/sacral paraspinal lower back tenderness. No stepoffs. Pos leg raise bilaterally.   EXT: No edema, no calf tenderness, 2+ DP pulses bilaterally, no deformities.  NEURO: A&Ox3. Moving all extremities. ROM of lower ext limited due to pain  SKIN: Warm and dry.

## 2021-09-05 NOTE — H&P ADULT - HISTORY OF PRESENT ILLNESS
51 yr old female with a pmh of lupus, SCD, hx of PE      Denies  headache, dizziness, chest pain, palpitations, SOB, abdominal pain, joint pain, diarrhea/constipation, urinary symptoms.   Vitals in the ED Tmax 99.1, , /81, RR 17 satting 100% RA 51 yr old female with a pmh of lupus, SCD, hx of PE on warfarin who presents with a one week history of lower back pain that is constant and worsening. Sitting still makes the pain better. Otherwise describes it as 20/10. Hurts to move her legs with active ROM but passive ROM ellicits no pain. Denies fecal or urinary incontinence Denies leg weakness. Also reports since cholecystectomy in July has not really felt like eating.      Denies  headache, dizziness, chest pain, palpitations, SOB, abdominal pain, joint pain, diarrhea/constipation, urinary symptoms.   Vitals in the ED Tmax 99.1, , /81, RR 17 satting 100% RA

## 2021-09-05 NOTE — H&P ADULT - PROBLEM SELECTOR PLAN 1
New  1 week history of back pain  CT imaging negative  PT exercises as outpatient   Consider MRI if no improvement  Lidocaine patches and Tylenol for pain

## 2021-09-05 NOTE — H&P ADULT - NSHPLABSRESULTS_GEN_ALL_CORE
9.6    11.18 )-----------( 302      ( 05 Sep 2021 14:19 )             28.8       09-05    137  |  99  |  13  ----------------------------<  145<H>  5.4<H>   |  20<L>  |  0.82    Ca    9.8      05 Sep 2021 14:19    TPro  8.8<H>  /  Alb  4.9  /  TBili  2.4<H>  /  DBili  x   /  AST  64<H>  /  ALT  27  /  AlkPhos  175<H>  09-05        Urinalysis Basic - ( 05 Sep 2021 19:00 )    Color: Yellow / Appearance: Clear / SG: >1.050 / pH: x  Gluc: x / Ketone: Trace  / Bili: Negative / Urobili: 3 mg/dL   Blood: x / Protein: Trace / Nitrite: Negative   Leuk Esterase: Negative / RBC: 1 /HPF / WBC 3 /HPF   Sq Epi: x / Non Sq Epi: 0 /HPF / Bacteria: Negative    interpreted by radiology:  CT lumbar spine: Unremarkable CT of the lumbar spine. No fractures, lytic or blastic lesions.  CT abdo/pelvis: No acute findings within the abdomen or pelvis. s/p cholecystectomy 9.6    11.18 )-----------( 302      ( 05 Sep 2021 14:19 )             28.8       09-05    137  |  99  |  13  ----------------------------<  145<H>  5.4<H>   |  20<L>  |  0.82    Ca    9.8      05 Sep 2021 14:19    TPro  8.8<H>  /  Alb  4.9  /  TBili  2.4<H>  /  DBili  x   /  AST  64<H>  /  ALT  27  /  AlkPhos  175<H>  09-05        Urinalysis Basic - ( 05 Sep 2021 19:00 )    Color: Yellow / Appearance: Clear / SG: >1.050 / pH: x  Gluc: x / Ketone: Trace  / Bili: Negative / Urobili: 3 mg/dL   Blood: x / Protein: Trace / Nitrite: Negative   Leuk Esterase: Negative / RBC: 1 /HPF / WBC 3 /HPF   Sq Epi: x / Non Sq Epi: 0 /HPF / Bacteria: Negative    interpreted by radiology:  CT lumbar spine: Unremarkable CT of the lumbar spine. No fractures, lytic or blastic lesions.  CT abdo/pelvis: No acute findings within the abdomen or pelvis. s/p cholecystectomy    EKG interpreted by myself: sinus tach rate ~100

## 2021-09-05 NOTE — H&P ADULT - NSHPREVIEWOFSYSTEMS_GEN_ALL_CORE
REVIEW OF SYSTEMS:    CONSTITUTIONAL: No weakness, fevers or chills, decreased PO intake  EYES/ENT: No visual changes;  No dysphagia; No sore throat; No rhinorrhea; No sinus pain/pressure  NECK: No pain or stiffness  RESPIRATORY: No cough, wheezing, hemoptysis; No shortness of breath  CARDIOVASCULAR: No chest pain or palpitations; No lower extremity edema  GASTROINTESTINAL: No abdominal or epigastric pain. No nausea, vomiting, or hematemesis; No diarrhea or constipation. No melena or hematochezia.  GENITOURINARY: No dysuria, frequency or hematuria  NEUROLOGICAL: No numbness or weakness  MSK: ambulates independently, +back pain  SKIN: No itching, burning, rashes, or lesions   All other review of systems is negative unless indicated above.

## 2021-09-05 NOTE — ED PROVIDER NOTE - CLINICAL SUMMARY MEDICAL DECISION MAKING FREE TEXT BOX
50 yo female pt w PMH lupus, SC; who complains of lower back pain, abdominal BL side pain, and decreased urine output. Exam found w back tenderness, pos leg raise, decreased urine. COncerned for urinary retention secondary to cord compression; no BM changes. Will order labs, imaging, and medication for pain. 50 yo female pt w PMH lupus, SC; who complains of lower back pain, abdominal BL side pain, and decreased urine output. Exam found w back tenderness, pos leg raise, decreased urine. COncerned for urinary retention but not clinically concerned for cord compression; no BM changes. Will order labs, imaging, and medication for pain. 52 yo female pt w PMH lupus, SC; who complains of lower back pain, abdominal BL side pain, and decreased urine output. Exam found w back tenderness, pos leg raise, decreased urine. Will order labs, imaging, and medication for pain. Will reassess

## 2021-09-05 NOTE — ED PROVIDER NOTE - PROGRESS NOTE DETAILS
bladder US done bedside and found w no urinary retention Pt reassessed after CT results.  She has continued pain, but is now urinating without any difficulties after IV hudration.  Pt has no midline vertebral ttp, but TTP bilaterally at SI joints.  Toradol given, lidocaine patch ordered.  Pt required significant assistance to take a few steps into wheel chair to use restroom.  Pt reports weakness in legs secondary to back pain.  Not concerned for cord compression but patient's pain is not well controlled and she cannot walk well or care for herself at home.  Pt will likely be admitted.  - Asuncion Ruano,

## 2021-09-05 NOTE — ED ADULT NURSE NOTE - OBJECTIVE STATEMENT
Pt received to room 25. Pt with PMH of lupus, sickle cell disease and PE's (on coumadin) presents to ED c/o lower back pain & pelvic pain. Reports her pelvic pain started on Saturday 8/28 but then "subsided after a few days." Reports that pain has returned and is now "worse than before." Pt appears uncomfortable and in pain. Denies dysuria, hematuria, N/V/D, fevers, chills, weakness, dizziness, headache, palpitations, cough, chest pain, shortness of breath. Respirations are even & unlabored, lung sounds clear, heart sounds normal, abdomen is soft, nondistended. Pt has tenderness to abdomen upon palpation. 20 G IV placed to left AC. Pt is A&Ox4, ambulates independently. Side rails up for safety, bed in lowest position, call bell within reach.

## 2021-09-06 LAB
A1C WITH ESTIMATED AVERAGE GLUCOSE RESULT: 4.2 % — SIGNIFICANT CHANGE UP (ref 4–5.6)
ALBUMIN SERPL ELPH-MCNC: 4.1 G/DL — SIGNIFICANT CHANGE UP (ref 3.3–5)
ALP SERPL-CCNC: 150 U/L — HIGH (ref 40–120)
ALT FLD-CCNC: 16 U/L — SIGNIFICANT CHANGE UP (ref 4–33)
ANION GAP SERPL CALC-SCNC: 13 MMOL/L — SIGNIFICANT CHANGE UP (ref 7–14)
APTT BLD: 151.5 SEC — CRITICAL HIGH (ref 27–36.3)
APTT BLD: 25.4 SEC — LOW (ref 27–36.3)
AST SERPL-CCNC: 16 U/L — SIGNIFICANT CHANGE UP (ref 4–32)
BASOPHILS # BLD AUTO: 0.03 K/UL — SIGNIFICANT CHANGE UP (ref 0–0.2)
BASOPHILS NFR BLD AUTO: 0.3 % — SIGNIFICANT CHANGE UP (ref 0–2)
BILIRUB SERPL-MCNC: 1.6 MG/DL — HIGH (ref 0.2–1.2)
BUN SERPL-MCNC: 12 MG/DL — SIGNIFICANT CHANGE UP (ref 7–23)
CALCIUM SERPL-MCNC: 9.5 MG/DL — SIGNIFICANT CHANGE UP (ref 8.4–10.5)
CHLORIDE SERPL-SCNC: 104 MMOL/L — SIGNIFICANT CHANGE UP (ref 98–107)
CO2 SERPL-SCNC: 23 MMOL/L — SIGNIFICANT CHANGE UP (ref 22–31)
COVID-19 SPIKE DOMAIN AB INTERP: POSITIVE
COVID-19 SPIKE DOMAIN ANTIBODY RESULT: >250 U/ML — HIGH
CREAT SERPL-MCNC: 0.76 MG/DL — SIGNIFICANT CHANGE UP (ref 0.5–1.3)
EOSINOPHIL # BLD AUTO: 0.16 K/UL — SIGNIFICANT CHANGE UP (ref 0–0.5)
EOSINOPHIL NFR BLD AUTO: 1.4 % — SIGNIFICANT CHANGE UP (ref 0–6)
ESTIMATED AVERAGE GLUCOSE: 74 — SIGNIFICANT CHANGE UP
GLUCOSE SERPL-MCNC: 100 MG/DL — HIGH (ref 70–99)
HCT VFR BLD CALC: 21.5 % — LOW (ref 34.5–45)
HCT VFR BLD CALC: 25 % — LOW (ref 34.5–45)
HGB BLD-MCNC: 7.2 G/DL — LOW (ref 11.5–15.5)
HGB BLD-MCNC: 9.1 G/DL — LOW (ref 11.5–15.5)
IANC: 5.89 K/UL — SIGNIFICANT CHANGE UP (ref 1.5–8.5)
IMM GRANULOCYTES NFR BLD AUTO: 1 % — SIGNIFICANT CHANGE UP (ref 0–1.5)
INR BLD: 1.27 RATIO — HIGH (ref 0.88–1.16)
LYMPHOCYTES # BLD AUTO: 38.2 % — SIGNIFICANT CHANGE UP (ref 13–44)
LYMPHOCYTES # BLD AUTO: 4.49 K/UL — HIGH (ref 1–3.3)
MCHC RBC-ENTMCNC: 30.8 PG — SIGNIFICANT CHANGE UP (ref 27–34)
MCHC RBC-ENTMCNC: 31.5 PG — SIGNIFICANT CHANGE UP (ref 27–34)
MCHC RBC-ENTMCNC: 33.4 GM/DL — SIGNIFICANT CHANGE UP (ref 32–36)
MCHC RBC-ENTMCNC: 36.4 GM/DL — HIGH (ref 32–36)
MCV RBC AUTO: 91.9 FL — SIGNIFICANT CHANGE UP (ref 80–100)
MCV RBC AUTO: 94 FL — SIGNIFICANT CHANGE UP (ref 80–100)
MONOCYTES # BLD AUTO: 1.06 K/UL — HIGH (ref 0–0.9)
MONOCYTES NFR BLD AUTO: 9 % — SIGNIFICANT CHANGE UP (ref 2–14)
NEUTROPHILS # BLD AUTO: 5.89 K/UL — SIGNIFICANT CHANGE UP (ref 1.8–7.4)
NEUTROPHILS NFR BLD AUTO: 50.1 % — SIGNIFICANT CHANGE UP (ref 43–77)
NRBC # BLD: 6 /100 WBCS — SIGNIFICANT CHANGE UP
NRBC # BLD: 9 /100 WBCS — SIGNIFICANT CHANGE UP
NRBC # FLD: 0.72 K/UL — HIGH
NRBC # FLD: 1.02 K/UL — HIGH
PLATELET # BLD AUTO: 249 K/UL — SIGNIFICANT CHANGE UP (ref 150–400)
PLATELET # BLD AUTO: 262 K/UL — SIGNIFICANT CHANGE UP (ref 150–400)
POTASSIUM SERPL-MCNC: 3.5 MMOL/L — SIGNIFICANT CHANGE UP (ref 3.5–5.3)
POTASSIUM SERPL-SCNC: 3.5 MMOL/L — SIGNIFICANT CHANGE UP (ref 3.5–5.3)
PROT SERPL-MCNC: 7.2 G/DL — SIGNIFICANT CHANGE UP (ref 6–8.3)
PROTHROM AB SERPL-ACNC: 14.3 SEC — HIGH (ref 10.6–13.6)
RBC # BLD: 2.34 M/UL — LOW (ref 3.8–5.2)
RBC # BLD: 2.64 M/UL — LOW (ref 3.8–5.2)
RBC # BLD: 2.64 M/UL — LOW (ref 3.8–5.2)
RBC # FLD: 13.6 % — SIGNIFICANT CHANGE UP (ref 10.3–14.5)
RBC # FLD: 13.9 % — SIGNIFICANT CHANGE UP (ref 10.3–14.5)
RETICS #: 151 K/UL — HIGH (ref 25–125)
RETICS/RBC NFR: 5.8 % — HIGH (ref 0.5–2.5)
SARS-COV-2 IGG+IGM SERPL QL IA: >250 U/ML — HIGH
SARS-COV-2 IGG+IGM SERPL QL IA: POSITIVE
SODIUM SERPL-SCNC: 140 MMOL/L — SIGNIFICANT CHANGE UP (ref 135–145)
WBC # BLD: 11 K/UL — HIGH (ref 3.8–10.5)
WBC # BLD: 11.75 K/UL — HIGH (ref 3.8–10.5)
WBC # FLD AUTO: 11 K/UL — HIGH (ref 3.8–10.5)
WBC # FLD AUTO: 11.75 K/UL — HIGH (ref 3.8–10.5)

## 2021-09-06 RX ORDER — HEPARIN SODIUM 5000 [USP'U]/ML
2500 INJECTION INTRAVENOUS; SUBCUTANEOUS EVERY 6 HOURS
Refills: 0 | Status: DISCONTINUED | OUTPATIENT
Start: 2021-09-06 | End: 2021-09-10

## 2021-09-06 RX ORDER — MYCOPHENOLATE MOFETIL 250 MG/1
1000 CAPSULE ORAL
Refills: 0 | Status: DISCONTINUED | OUTPATIENT
Start: 2021-09-06 | End: 2021-09-16

## 2021-09-06 RX ORDER — HEPARIN SODIUM 5000 [USP'U]/ML
INJECTION INTRAVENOUS; SUBCUTANEOUS
Qty: 25000 | Refills: 0 | Status: DISCONTINUED | OUTPATIENT
Start: 2021-09-06 | End: 2021-09-10

## 2021-09-06 RX ORDER — WARFARIN SODIUM 2.5 MG/1
0 TABLET ORAL
Qty: 0 | Refills: 0 | DISCHARGE

## 2021-09-06 RX ORDER — HEPARIN SODIUM 5000 [USP'U]/ML
5000 INJECTION INTRAVENOUS; SUBCUTANEOUS ONCE
Refills: 0 | Status: COMPLETED | OUTPATIENT
Start: 2021-09-06 | End: 2021-09-06

## 2021-09-06 RX ORDER — MYCOPHENOLIC ACID 180 MG/1
500 TABLET, DELAYED RELEASE ORAL
Qty: 0 | Refills: 0 | DISCHARGE

## 2021-09-06 RX ORDER — HEPARIN SODIUM 5000 [USP'U]/ML
5000 INJECTION INTRAVENOUS; SUBCUTANEOUS EVERY 6 HOURS
Refills: 0 | Status: DISCONTINUED | OUTPATIENT
Start: 2021-09-06 | End: 2021-09-10

## 2021-09-06 RX ORDER — WARFARIN SODIUM 2.5 MG/1
7.5 TABLET ORAL ONCE
Refills: 0 | Status: COMPLETED | OUTPATIENT
Start: 2021-09-06 | End: 2021-09-06

## 2021-09-06 RX ORDER — KETOROLAC TROMETHAMINE 30 MG/ML
15 SYRINGE (ML) INJECTION ONCE
Refills: 0 | Status: DISCONTINUED | OUTPATIENT
Start: 2021-09-06 | End: 2021-09-06

## 2021-09-06 RX ADMIN — HEPARIN SODIUM 1100 UNIT(S)/HR: 5000 INJECTION INTRAVENOUS; SUBCUTANEOUS at 14:20

## 2021-09-06 RX ADMIN — Medication 5 MILLIGRAM(S): at 06:19

## 2021-09-06 RX ADMIN — LIDOCAINE 1 PATCH: 4 CREAM TOPICAL at 11:09

## 2021-09-06 RX ADMIN — SODIUM CHLORIDE 100 MILLILITER(S): 9 INJECTION INTRAMUSCULAR; INTRAVENOUS; SUBCUTANEOUS at 04:00

## 2021-09-06 RX ADMIN — LIDOCAINE 1 PATCH: 4 CREAM TOPICAL at 23:00

## 2021-09-06 RX ADMIN — Medication 15 MILLIGRAM(S): at 11:25

## 2021-09-06 RX ADMIN — HEPARIN SODIUM 5000 UNIT(S): 5000 INJECTION INTRAVENOUS; SUBCUTANEOUS at 14:21

## 2021-09-06 RX ADMIN — Medication 15 MILLIGRAM(S): at 11:09

## 2021-09-06 RX ADMIN — HEPARIN SODIUM 0 UNIT(S)/HR: 5000 INJECTION INTRAVENOUS; SUBCUTANEOUS at 22:07

## 2021-09-06 RX ADMIN — HEPARIN SODIUM 900 UNIT(S)/HR: 5000 INJECTION INTRAVENOUS; SUBCUTANEOUS at 22:15

## 2021-09-06 RX ADMIN — LIDOCAINE 1 PATCH: 4 CREAM TOPICAL at 06:04

## 2021-09-06 RX ADMIN — LIDOCAINE 1 PATCH: 4 CREAM TOPICAL at 18:58

## 2021-09-06 RX ADMIN — WARFARIN SODIUM 7.5 MILLIGRAM(S): 2.5 TABLET ORAL at 19:02

## 2021-09-06 NOTE — PROGRESS NOTE ADULT - SUBJECTIVE AND OBJECTIVE BOX
Patient is a 51y old  Female who presents with a chief complaint of back pain/poor po intake (05 Sep 2021 21:41)      9/6/21  LBP ++    PAST MEDICAL & SURGICAL HISTORY:  Lupus    Sickle cell disease    No significant past surgical history        Review of Systems:   CONSTITUTIONAL: No fever, weight loss, or fatigue  EYES: No eye pain, visual disturbances, or discharge  ENMT:  No difficulty hearing, tinnitus, vertigo; No sinus or throat pain  NECK: No pain or stiffness  BREASTS: No pain, masses, or nipple discharge  RESPIRATORY: No cough, wheezing, chills or hemoptysis; No shortness of breath  CARDIOVASCULAR: No chest pain, palpitations, dizziness, or leg swelling  GASTROINTESTINAL: No abdominal or epigastric pain. No nausea, vomiting, or hematemesis; No diarrhea or constipation. No melena or hematochezia.  GENITOURINARY: No dysuria, frequency, hematuria, or incontinence  NEUROLOGICAL: No headaches, memory loss, loss of strength, numbness, or tremors  SKIN: No itching, burning, rashes, or lesions   LYMPH NODES: No enlarged glands  ENDOCRINE: No heat or cold intolerance; No hair loss  MUSCULOSKELETAL: No joint pain or swelling; No muscle, back, or extremity pain  PSYCHIATRIC: No depression, anxiety, mood swings, or difficulty sleeping  HEME/LYMPH: No easy bruising, or bleeding gums  ALLERY AND IMMUNOLOGIC: No hives or eczema    Allergies    No Known Allergies    Intolerances        Social History:     FAMILY HISTORY:  No pertinent family history in first degree relatives        MEDICATIONS  (STANDING):  heparin  Infusion.  Unit(s)/Hr (11 mL/Hr) IV Continuous <Continuous>  lidocaine   4% Patch 1 Patch Transdermal daily  mycophenolate mofetil 1000 milliGRAM(s) Oral two times a day  predniSONE   Tablet 5 milliGRAM(s) Oral daily  sodium chloride 0.9%. 1000 milliLiter(s) (100 mL/Hr) IV Continuous <Continuous>    MEDICATIONS  (PRN):  acetaminophen   Tablet .. 650 milliGRAM(s) Oral every 6 hours PRN Temp greater or equal to 38.5C (101.3F), Mild Pain (1 - 3)  aluminum hydroxide/magnesium hydroxide/simethicone Suspension 30 milliLiter(s) Oral every 4 hours PRN Dyspepsia  heparin   Injectable 5000 Unit(s) IV Push every 6 hours PRN For aPTT less than 40  heparin   Injectable 2500 Unit(s) IV Push every 6 hours PRN For aPTT between 40 - 57  melatonin 3 milliGRAM(s) Oral at bedtime PRN Insomnia  ondansetron Injectable 4 milliGRAM(s) IV Push every 8 hours PRN Nausea and/or Vomiting        CAPILLARY BLOOD GLUCOSE        I&O's Summary    06 Sep 2021 07:01  -  06 Sep 2021 23:45  --------------------------------------------------------  IN: 1266 mL / OUT: 250 mL / NET: 1016 mL        PHYSICAL EXAM:  Vital Signs Last 24 Hrs  T(C): 37.1 (06 Sep 2021 21:59), Max: 37.1 (06 Sep 2021 17:14)  T(F): 98.8 (06 Sep 2021 21:59), Max: 98.8 (06 Sep 2021 21:59)  HR: 93 (06 Sep 2021 21:59) (78 - 93)  BP: 112/61 (06 Sep 2021 21:59) (103/64 - 118/65)  BP(mean): --  RR: 18 (06 Sep 2021 21:59) (18 - 18)  SpO2: 97% (06 Sep 2021 21:59) (96% - 98%)    GENERAL: NAD, well-developed  HEAD:  Atraumatic, Normocephalic  EYES: EOMI, PERRLA, conjunctiva and sclera clear  NECK: Supple, No JVD  CHEST/LUNG: Clear to auscultation bilaterally; No wheeze  HEART: Regular rate and rhythm; No murmurs, rubs, or gallops  ABDOMEN: Soft, Nontender, Nondistended; Bowel sounds present  EXTREMITIES:  2+ Peripheral Pulses, No clubbing, cyanosis, or edema  PSYCH: AAOx3  NEUROLOGY: non-focal  SKIN: No rashes or lesions    LABS:                        7.2    11.00 )-----------( 249      ( 06 Sep 2021 20:22 )             21.5     09-06    140  |  104  |  12  ----------------------------<  100<H>  3.5   |  23  |  0.76    Ca    9.5      06 Sep 2021 07:10    TPro  7.2  /  Alb  4.1  /  TBili  1.6<H>  /  DBili  x   /  AST  16  /  ALT  16  /  AlkPhos  150<H>  09-06    PT/INR - ( 06 Sep 2021 07:10 )   PT: 14.3 sec;   INR: 1.27 ratio         PTT - ( 06 Sep 2021 20:22 )  PTT:151.5 sec      Urinalysis Basic - ( 05 Sep 2021 19:00 )    Color: Yellow / Appearance: Clear / SG: >1.050 / pH: x  Gluc: x / Ketone: Trace  / Bili: Negative / Urobili: 3 mg/dL   Blood: x / Protein: Trace / Nitrite: Negative   Leuk Esterase: Negative / RBC: 1 /HPF / WBC 3 /HPF   Sq Epi: x / Non Sq Epi: 0 /HPF / Bacteria: Negative        RADIOLOGY & ADDITIONAL TESTS:    Imaging Personally Reviewed:    Consultant(s) Notes Reviewed:      Care Discussed with Consultants/Other Providers:

## 2021-09-06 NOTE — PHARMACOTHERAPY INTERVENTION NOTE - COMMENTS
Medication list reviewed with patient and verified with [facility].   Patient fills Warfarin from [facility] (Closed Labor Day). But patient reports to taking Warfarin 5mg once daily ([employer] shows 5mg tablets filled 7/29/21 x 3 months supply)

## 2021-09-07 LAB
ALBUMIN SERPL ELPH-MCNC: 3.7 G/DL — SIGNIFICANT CHANGE UP (ref 3.3–5)
ALP SERPL-CCNC: 118 U/L — SIGNIFICANT CHANGE UP (ref 40–120)
ALT FLD-CCNC: 16 U/L — SIGNIFICANT CHANGE UP (ref 4–33)
ANION GAP SERPL CALC-SCNC: 10 MMOL/L — SIGNIFICANT CHANGE UP (ref 7–14)
ANION GAP SERPL CALC-SCNC: 12 MMOL/L — SIGNIFICANT CHANGE UP (ref 7–14)
APTT BLD: 102.2 SEC — HIGH (ref 27–36.3)
APTT BLD: 102.7 SEC — HIGH (ref 27–36.3)
APTT BLD: 97.5 SEC — HIGH (ref 27–36.3)
AST SERPL-CCNC: 15 U/L — SIGNIFICANT CHANGE UP (ref 4–32)
BILIRUB DIRECT SERPL-MCNC: 0.3 MG/DL — HIGH (ref 0–0.2)
BILIRUB INDIRECT FLD-MCNC: 0.8 MG/DL — SIGNIFICANT CHANGE UP (ref 0–1)
BILIRUB SERPL-MCNC: 1.1 MG/DL — SIGNIFICANT CHANGE UP (ref 0.2–1.2)
BLD GP AB SCN SERPL QL: NEGATIVE — SIGNIFICANT CHANGE UP
BUN SERPL-MCNC: 4 MG/DL — LOW (ref 7–23)
BUN SERPL-MCNC: 5 MG/DL — LOW (ref 7–23)
C3 SERPL-MCNC: 111 MG/DL — SIGNIFICANT CHANGE UP (ref 90–180)
CALCIUM SERPL-MCNC: 5.7 MG/DL — CRITICAL LOW (ref 8.4–10.5)
CALCIUM SERPL-MCNC: 8.9 MG/DL — SIGNIFICANT CHANGE UP (ref 8.4–10.5)
CHLORIDE SERPL-SCNC: 109 MMOL/L — HIGH (ref 98–107)
CHLORIDE SERPL-SCNC: 119 MMOL/L — HIGH (ref 98–107)
CO2 SERPL-SCNC: 14 MMOL/L — LOW (ref 22–31)
CO2 SERPL-SCNC: 22 MMOL/L — SIGNIFICANT CHANGE UP (ref 22–31)
CREAT SERPL-MCNC: 0.35 MG/DL — LOW (ref 0.5–1.3)
CREAT SERPL-MCNC: 0.55 MG/DL — SIGNIFICANT CHANGE UP (ref 0.5–1.3)
CRP SERPL-MCNC: 7.3 MG/L — HIGH
CULTURE RESULTS: SIGNIFICANT CHANGE UP
GLUCOSE SERPL-MCNC: 175 MG/DL — HIGH (ref 70–99)
GLUCOSE SERPL-MCNC: 194 MG/DL — HIGH (ref 70–99)
HAPTOGLOB SERPL-MCNC: <20 MG/DL — LOW (ref 34–200)
HCT VFR BLD CALC: 18.7 % — CRITICAL LOW (ref 34.5–45)
HCT VFR BLD CALC: 21.5 % — LOW (ref 34.5–45)
HGB BLD-MCNC: 6.8 G/DL — CRITICAL LOW (ref 11.5–15.5)
HGB BLD-MCNC: 8.2 G/DL — LOW (ref 11.5–15.5)
INR BLD: 1.36 RATIO — HIGH (ref 0.88–1.16)
LDH SERPL L TO P-CCNC: 398 U/L — HIGH (ref 135–225)
MAGNESIUM SERPL-MCNC: 1.6 MG/DL — SIGNIFICANT CHANGE UP (ref 1.6–2.6)
MCHC RBC-ENTMCNC: 30.7 PG — SIGNIFICANT CHANGE UP (ref 27–34)
MCHC RBC-ENTMCNC: 33.7 PG — SIGNIFICANT CHANGE UP (ref 27–34)
MCHC RBC-ENTMCNC: 36.4 GM/DL — HIGH (ref 32–36)
MCHC RBC-ENTMCNC: 38 GM/DL — HIGH (ref 32–36)
MCV RBC AUTO: 91.5 FL — SIGNIFICANT CHANGE UP (ref 80–100)
MCV RBC AUTO: 92.6 FL — SIGNIFICANT CHANGE UP (ref 80–100)
NRBC # BLD: 5 /100 WBCS — SIGNIFICANT CHANGE UP
NRBC # BLD: 6 /100 WBCS — SIGNIFICANT CHANGE UP
NRBC # FLD: 0.52 K/UL — HIGH
NRBC # FLD: 0.55 K/UL — HIGH
PHOSPHATE SERPL-MCNC: 2.4 MG/DL — LOW (ref 2.5–4.5)
PLATELET # BLD AUTO: 259 K/UL — SIGNIFICANT CHANGE UP (ref 150–400)
PLATELET # BLD AUTO: 279 K/UL — SIGNIFICANT CHANGE UP (ref 150–400)
POTASSIUM SERPL-MCNC: 2.2 MMOL/L — CRITICAL LOW (ref 3.5–5.3)
POTASSIUM SERPL-MCNC: 3.3 MMOL/L — LOW (ref 3.5–5.3)
POTASSIUM SERPL-SCNC: 2.2 MMOL/L — CRITICAL LOW (ref 3.5–5.3)
POTASSIUM SERPL-SCNC: 3.3 MMOL/L — LOW (ref 3.5–5.3)
PROT SERPL-MCNC: 6.5 G/DL — SIGNIFICANT CHANGE UP (ref 6–8.3)
PROTHROM AB SERPL-ACNC: 15.4 SEC — HIGH (ref 10.6–13.6)
RBC # BLD: 2.02 M/UL — LOW (ref 3.8–5.2)
RBC # BLD: 2.35 M/UL — LOW (ref 3.8–5.2)
RBC # BLD: 2.35 M/UL — LOW (ref 3.8–5.2)
RBC # FLD: 13.4 % — SIGNIFICANT CHANGE UP (ref 10.3–14.5)
RBC # FLD: 13.9 % — SIGNIFICANT CHANGE UP (ref 10.3–14.5)
RETICS #: 124.4 K/UL — SIGNIFICANT CHANGE UP (ref 25–125)
RETICS/RBC NFR: 5.4 % — HIGH (ref 0.5–2.5)
RH IG SCN BLD-IMP: NEGATIVE — SIGNIFICANT CHANGE UP
SODIUM SERPL-SCNC: 143 MMOL/L — SIGNIFICANT CHANGE UP (ref 135–145)
SODIUM SERPL-SCNC: 143 MMOL/L — SIGNIFICANT CHANGE UP (ref 135–145)
SPECIMEN SOURCE: SIGNIFICANT CHANGE UP
WBC # BLD: 10.03 K/UL — SIGNIFICANT CHANGE UP (ref 3.8–10.5)
WBC # BLD: 9.97 K/UL — SIGNIFICANT CHANGE UP (ref 3.8–10.5)
WBC # FLD AUTO: 10.03 K/UL — SIGNIFICANT CHANGE UP (ref 3.8–10.5)
WBC # FLD AUTO: 9.97 K/UL — SIGNIFICANT CHANGE UP (ref 3.8–10.5)

## 2021-09-07 PROCEDURE — 72158 MRI LUMBAR SPINE W/O & W/DYE: CPT | Mod: 26

## 2021-09-07 PROCEDURE — 99223 1ST HOSP IP/OBS HIGH 75: CPT

## 2021-09-07 PROCEDURE — 93010 ELECTROCARDIOGRAM REPORT: CPT

## 2021-09-07 PROCEDURE — 83020 HEMOGLOBIN ELECTROPHORESIS: CPT | Mod: 26

## 2021-09-07 RX ORDER — POTASSIUM CHLORIDE 20 MEQ
40 PACKET (EA) ORAL DAILY
Refills: 0 | Status: COMPLETED | OUTPATIENT
Start: 2021-09-07 | End: 2021-09-07

## 2021-09-07 RX ORDER — ACETAMINOPHEN 500 MG
650 TABLET ORAL EVERY 6 HOURS
Refills: 0 | Status: COMPLETED | OUTPATIENT
Start: 2021-09-07 | End: 2021-09-09

## 2021-09-07 RX ORDER — KETOROLAC TROMETHAMINE 30 MG/ML
15 SYRINGE (ML) INJECTION EVERY 6 HOURS
Refills: 0 | Status: DISCONTINUED | OUTPATIENT
Start: 2021-09-07 | End: 2021-09-09

## 2021-09-07 RX ORDER — WARFARIN SODIUM 2.5 MG/1
7.5 TABLET ORAL ONCE
Refills: 0 | Status: COMPLETED | OUTPATIENT
Start: 2021-09-07 | End: 2021-09-07

## 2021-09-07 RX ORDER — OXYCODONE HYDROCHLORIDE 5 MG/1
5 TABLET ORAL ONCE
Refills: 0 | Status: DISCONTINUED | OUTPATIENT
Start: 2021-09-07 | End: 2021-09-07

## 2021-09-07 RX ORDER — SODIUM,POTASSIUM PHOSPHATES 278-250MG
1 POWDER IN PACKET (EA) ORAL
Refills: 0 | Status: COMPLETED | OUTPATIENT
Start: 2021-09-07 | End: 2021-09-09

## 2021-09-07 RX ORDER — OXYCODONE HYDROCHLORIDE 5 MG/1
5 TABLET ORAL EVERY 6 HOURS
Refills: 0 | Status: DISCONTINUED | OUTPATIENT
Start: 2021-09-07 | End: 2021-09-10

## 2021-09-07 RX ORDER — ACETAMINOPHEN 500 MG
1000 TABLET ORAL ONCE
Refills: 0 | Status: COMPLETED | OUTPATIENT
Start: 2021-09-07 | End: 2021-09-07

## 2021-09-07 RX ORDER — KETOROLAC TROMETHAMINE 30 MG/ML
15 SYRINGE (ML) INJECTION ONCE
Refills: 0 | Status: DISCONTINUED | OUTPATIENT
Start: 2021-09-07 | End: 2021-09-07

## 2021-09-07 RX ORDER — GABAPENTIN 400 MG/1
200 CAPSULE ORAL EVERY 12 HOURS
Refills: 0 | Status: DISCONTINUED | OUTPATIENT
Start: 2021-09-07 | End: 2021-09-16

## 2021-09-07 RX ORDER — CYCLOBENZAPRINE HYDROCHLORIDE 10 MG/1
5 TABLET, FILM COATED ORAL EVERY 8 HOURS
Refills: 0 | Status: DISCONTINUED | OUTPATIENT
Start: 2021-09-07 | End: 2021-09-16

## 2021-09-07 RX ORDER — WARFARIN SODIUM 2.5 MG/1
7 TABLET ORAL ONCE
Refills: 0 | Status: DISCONTINUED | OUTPATIENT
Start: 2021-09-07 | End: 2021-09-07

## 2021-09-07 RX ORDER — PANTOPRAZOLE SODIUM 20 MG/1
40 TABLET, DELAYED RELEASE ORAL
Refills: 0 | Status: DISCONTINUED | OUTPATIENT
Start: 2021-09-07 | End: 2021-09-16

## 2021-09-07 RX ADMIN — HEPARIN SODIUM 800 UNIT(S)/HR: 5000 INJECTION INTRAVENOUS; SUBCUTANEOUS at 04:58

## 2021-09-07 RX ADMIN — HEPARIN SODIUM 700 UNIT(S)/HR: 5000 INJECTION INTRAVENOUS; SUBCUTANEOUS at 18:13

## 2021-09-07 RX ADMIN — Medication 15 MILLIGRAM(S): at 12:51

## 2021-09-07 RX ADMIN — ONDANSETRON 4 MILLIGRAM(S): 8 TABLET, FILM COATED ORAL at 11:37

## 2021-09-07 RX ADMIN — Medication 400 MILLIGRAM(S): at 15:11

## 2021-09-07 RX ADMIN — GABAPENTIN 200 MILLIGRAM(S): 400 CAPSULE ORAL at 18:22

## 2021-09-07 RX ADMIN — Medication 15 MILLIGRAM(S): at 18:22

## 2021-09-07 RX ADMIN — Medication 40 MILLIEQUIVALENT(S): at 11:42

## 2021-09-07 RX ADMIN — Medication 1 TABLET(S): at 11:42

## 2021-09-07 RX ADMIN — OXYCODONE HYDROCHLORIDE 5 MILLIGRAM(S): 5 TABLET ORAL at 13:00

## 2021-09-07 RX ADMIN — SODIUM CHLORIDE 100 MILLILITER(S): 9 INJECTION INTRAMUSCULAR; INTRAVENOUS; SUBCUTANEOUS at 00:24

## 2021-09-07 RX ADMIN — Medication 1 TABLET(S): at 18:14

## 2021-09-07 RX ADMIN — Medication 5 MILLIGRAM(S): at 05:35

## 2021-09-07 RX ADMIN — Medication 15 MILLIGRAM(S): at 23:52

## 2021-09-07 RX ADMIN — HEPARIN SODIUM 800 UNIT(S)/HR: 5000 INJECTION INTRAVENOUS; SUBCUTANEOUS at 10:54

## 2021-09-07 RX ADMIN — MYCOPHENOLATE MOFETIL 1000 MILLIGRAM(S): 250 CAPSULE ORAL at 05:33

## 2021-09-07 RX ADMIN — Medication 650 MILLIGRAM(S): at 20:58

## 2021-09-07 RX ADMIN — Medication 15 MILLIGRAM(S): at 10:43

## 2021-09-07 RX ADMIN — OXYCODONE HYDROCHLORIDE 5 MILLIGRAM(S): 5 TABLET ORAL at 12:12

## 2021-09-07 RX ADMIN — LIDOCAINE 1 PATCH: 4 CREAM TOPICAL at 19:37

## 2021-09-07 RX ADMIN — WARFARIN SODIUM 7.5 MILLIGRAM(S): 2.5 TABLET ORAL at 18:18

## 2021-09-07 RX ADMIN — MYCOPHENOLATE MOFETIL 1000 MILLIGRAM(S): 250 CAPSULE ORAL at 18:14

## 2021-09-07 RX ADMIN — CYCLOBENZAPRINE HYDROCHLORIDE 5 MILLIGRAM(S): 10 TABLET, FILM COATED ORAL at 18:19

## 2021-09-07 RX ADMIN — Medication 650 MILLIGRAM(S): at 05:06

## 2021-09-07 RX ADMIN — LIDOCAINE 1 PATCH: 4 CREAM TOPICAL at 11:42

## 2021-09-07 RX ADMIN — Medication 650 MILLIGRAM(S): at 04:06

## 2021-09-07 NOTE — CONSULT NOTE ADULT - SUBJECTIVE AND OBJECTIVE BOX
INCOMPLETE NOTE    HISTORY OF PRESENT ILLNESS:    PAST MEDICAL & SURGICAL HISTORY:  Lupus    Sickle cell disease    No significant past surgical history        REVIEW OF SYSTEMS      General:	    Skin/Breast:  	  Ophthalmologic:  	  ENMT:	    Respiratory and Thorax:  	  Cardiovascular:	    Gastrointestinal:	    Genitourinary:	    Musculoskeletal:	    Neurological:	    Psychiatric:	    Hematology/Lymphatics:	    Endocrine:	    Allergic/Immunologic:	    MEDICATIONS  (STANDING):  heparin  Infusion.  Unit(s)/Hr (11 mL/Hr) IV Continuous <Continuous>  lidocaine   4% Patch 1 Patch Transdermal daily  mycophenolate mofetil 1000 milliGRAM(s) Oral two times a day  pantoprazole    Tablet 40 milliGRAM(s) Oral before breakfast  potassium phosphate / sodium phosphate Tablet (K-PHOS No. 2) 1 Tablet(s) Oral three times a day with meals  predniSONE   Tablet 5 milliGRAM(s) Oral daily  sodium chloride 0.9%. 1000 milliLiter(s) (100 mL/Hr) IV Continuous <Continuous>  warfarin 7.5 milliGRAM(s) Oral once    MEDICATIONS  (PRN):  acetaminophen   Tablet .. 650 milliGRAM(s) Oral every 6 hours PRN Temp greater or equal to 38.5C (101.3F), Mild Pain (1 - 3)  aluminum hydroxide/magnesium hydroxide/simethicone Suspension 30 milliLiter(s) Oral every 4 hours PRN Dyspepsia  heparin   Injectable 5000 Unit(s) IV Push every 6 hours PRN For aPTT less than 40  heparin   Injectable 2500 Unit(s) IV Push every 6 hours PRN For aPTT between 40 - 57  melatonin 3 milliGRAM(s) Oral at bedtime PRN Insomnia  ondansetron Injectable 4 milliGRAM(s) IV Push every 8 hours PRN Nausea and/or Vomiting      Allergies    No Known Allergies    Intolerances        PERTINENT MEDICATION HISTORY:    SOCIAL HISTORY:    FAMILY HISTORY:  No pertinent family history in first degree relatives        Vital Signs Last 24 Hrs  T(C): 37 (07 Sep 2021 09:50), Max: 37.2 (07 Sep 2021 05:27)  T(F): 98.6 (07 Sep 2021 09:50), Max: 98.9 (07 Sep 2021 05:27)  HR: 89 (07 Sep 2021 09:50) (86 - 93)  BP: 121/56 (07 Sep 2021 09:50) (103/64 - 127/56)  BP(mean): --  RR: 18 (07 Sep 2021 09:50) (18 - 18)  SpO2: 97% (07 Sep 2021 09:50) (96% - 98%)    Daily     Daily     PHYSICAL EXAM:      Constitutional:    Eyes:    ENMT:    Neck:    Breasts:    Back:    Respiratory:    Cardiovascular:    Gastrointestinal:    Genitourinary:    Rectal:    Extremities:    Vascular:    Neurological:    Skin:    Lymph Nodes:    Musculoskeletal:    Psychiatric:        LABS:                        8.2    9.97  )-----------( 279      ( 07 Sep 2021 09:48 )             21.5     09-07    143  |  109<H>  |  5<L>  ----------------------------<  175<H>  3.3<L>   |  22  |  0.55    Ca    8.9      07 Sep 2021 09:48  Phos  2.4     09-07  Mg     1.60     09-07    TPro  6.5  /  Alb  3.7  /  TBili  1.1  /  DBili  0.3<H>  /  AST  15  /  ALT  16  /  AlkPhos  118  09-07    PT/INR - ( 07 Sep 2021 09:48 )   PT: 15.4 sec;   INR: 1.36 ratio         PTT - ( 07 Sep 2021 09:48 )  PTT:97.5 sec  Urinalysis Basic - ( 05 Sep 2021 19:00 )    Color: Yellow / Appearance: Clear / SG: >1.050 / pH: x  Gluc: x / Ketone: Trace  / Bili: Negative / Urobili: 3 mg/dL   Blood: x / Protein: Trace / Nitrite: Negative   Leuk Esterase: Negative / RBC: 1 /HPF / WBC 3 /HPF   Sq Epi: x / Non Sq Epi: 0 /HPF / Bacteria: Negative        RADIOLOGY & ADDITIONAL STUDIES: HISTORY OF PRESENT ILLNESS: Patient is a 51F with PMHx APLS (on coumadin), undifferentiated CTD, sickle cell trait, Hb C presenting for acute low back pain, decreased PO x1 week. CT L-spine, A/P negative. Found to have hemolytic anemia. Rheumatology consulted for r/o SLE flare.    Per [employer] documentation chart review:  Patient's rheumatologists are Dr. Madhav Up, has seen Dr. Reynolds as well in the past    APLS history: Patient was diagnosed in 1992 s.p stroke, was started on coumadin. In 2001 patient saw a hematologist, who recommended d/cing coumadin given antibodies negative and had vitreous hemorrhage. Patient was clinically ok until 2009 when she was diagnosed with b/l PE and coumadin was restarted. 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     Undifferentiated CTD: In 2012, patient had lethargy, alopecia, arthritis with Jacoud changes, arthropathy b/l hands, weight loss was dx with undifferentiated CTD, patient's symptoms responded to Plaquenil. In 2018, CellCept was started. Baseline Hb 10-11. Negative labs include YAJAIRA, LYLE, dsDNA, SSA, SSB, Complements, ANCA, CCP, RF, anti-histone, B2GP, ACL, LAC from [facility] chart review [id number]. Immunoglobulins 2180 and IgG4 142 in 2016 with repeat IgG4 .   ------------    Patient reports 2 weeks of low back pain, b/l lower lumbar just above the buttocks. Patient reports that she could not walk or bend over 2/2 pain and that today she has whole body pain. Pain areas also in the shoulders, hips, knees, legs, and arms. Pain described to be throbbing, constant, 10/10, worse on the L>R. Low back pain is non-radiating, no association with weakness, numbness, saddle anesthesias, fecal/urinary incontinence. Pain is worse with movement. There is stiffness in the joints of the fingers, knees, elbows lasting all day, first noticed today. Patient reports facial rash in the last 2 months worsened with the sun. Patient reports alopecia, dry eyes, CP (b/l worse with breathing), and 20 lb unintentional weight loss since gallbladder surgery 2 months ago. Patient denied fevers, chills, night sweats, oral ulcers, dry mouth, eye pain/redness, visual changes, SOB, abdominal pain, n/v/d/c, changes in urinary freq, dysuria, hematuria, foamy urine, hx miscarriages.     For her symptoms, patient reports that her rheumatologist placed her on 5mg PO prednisone daily which did not help.    PAST MEDICAL & SURGICAL HISTORY:  Lupus    Sickle cell disease    No significant past surgical history        REVIEW OF SYSTEMS  As per HPI    MEDICATIONS  (STANDING):  heparin  Infusion.  Unit(s)/Hr (11 mL/Hr) IV Continuous <Continuous>  lidocaine   4% Patch 1 Patch Transdermal daily  mycophenolate mofetil 1000 milliGRAM(s) Oral two times a day  pantoprazole    Tablet 40 milliGRAM(s) Oral before breakfast  potassium phosphate / sodium phosphate Tablet (K-PHOS No. 2) 1 Tablet(s) Oral three times a day with meals  predniSONE   Tablet 5 milliGRAM(s) Oral daily  sodium chloride 0.9%. 1000 milliLiter(s) (100 mL/Hr) IV Continuous <Continuous>  warfarin 7.5 milliGRAM(s) Oral once    MEDICATIONS  (PRN):  acetaminophen   Tablet .. 650 milliGRAM(s) Oral every 6 hours PRN Temp greater or equal to 38.5C (101.3F), Mild Pain (1 - 3)  aluminum hydroxide/magnesium hydroxide/simethicone Suspension 30 milliLiter(s) Oral every 4 hours PRN Dyspepsia  heparin   Injectable 5000 Unit(s) IV Push every 6 hours PRN For aPTT less than 40  heparin   Injectable 2500 Unit(s) IV Push every 6 hours PRN For aPTT between 40 - 57  melatonin 3 milliGRAM(s) Oral at bedtime PRN Insomnia  ondansetron Injectable 4 milliGRAM(s) IV Push every 8 hours PRN Nausea and/or Vomiting      Allergies    No Known Allergies    Intolerances        PERTINENT MEDICATION HISTORY:    SOCIAL HISTORY:    FAMILY HISTORY:  No pertinent family history in first degree relatives        Vital Signs Last 24 Hrs  T(C): 37 (07 Sep 2021 09:50), Max: 37.2 (07 Sep 2021 05:27)  T(F): 98.6 (07 Sep 2021 09:50), Max: 98.9 (07 Sep 2021 05:27)  HR: 89 (07 Sep 2021 09:50) (86 - 93)  BP: 121/56 (07 Sep 2021 09:50) (103/64 - 127/56)  BP(mean): --  RR: 18 (07 Sep 2021 09:50) (18 - 18)  SpO2: 97% (07 Sep 2021 09:50) (96% - 98%)    Daily     Daily     PHYSICAL EXAM:  Constitutional: WDWN resting comfortably in bed; NAD  Head: NC/AT  Eyes: PERRL, clear conjunctiva  ENT: no nasal discharge; uvula midline, no oropharyngeal erythema or exudates; MMM. No oral ulcers  Neck: supple  Respiratory: CTA B/L; no W/R/R  Cardiac: +S1/S2; RRR  Gastrointestinal: soft, NT/ND; no rebound or guarding; +BSx4  Back: lower lumbar pain b/l   Extremities: WWP, no clubbing or cyanosis; no peripheral edema  Musculoskeletal: NROM x4; no joint swelling, tenderness or erythema  Dermatologic: skin warm, dry and intact; no rashes, wounds, or scars  Lymphatic: no submandibular or cervical LAD  Neurologic: no focal deficits    LABS:                        8.2    9.97  )-----------( 279      ( 07 Sep 2021 09:48 )             21.5     09-07    143  |  109<H>  |  5<L>  ----------------------------<  175<H>  3.3<L>   |  22  |  0.55    Ca    8.9      07 Sep 2021 09:48  Phos  2.4     09-07  Mg     1.60     09-07    TPro  6.5  /  Alb  3.7  /  TBili  1.1  /  DBili  0.3<H>  /  AST  15  /  ALT  16  /  AlkPhos  118  09-07    PT/INR - ( 07 Sep 2021 09:48 )   PT: 15.4 sec;   INR: 1.36 ratio         PTT - ( 07 Sep 2021 09:48 )  PTT:97.5 sec  Urinalysis Basic - ( 05 Sep 2021 19:00 )    Color: Yellow / Appearance: Clear / SG: >1.050 / pH: x  Gluc: x / Ketone: Trace  / Bili: Negative / Urobili: 3 mg/dL   Blood: x / Protein: Trace / Nitrite: Negative   Leuk Esterase: Negative / RBC: 1 /HPF / WBC 3 /HPF   Sq Epi: x / Non Sq Epi: 0 /HPF / Bacteria: Negative        RADIOLOGY & ADDITIONAL STUDIES:  < from: CT Lumbar Spine Reform w/ IV Cont (09.05.21 @ 16:03) >    IMPRESSION:    Unremarkable CT of the lumbar spine. No fractures, lytic or blastic lesions.    < from: CT Abdomen and Pelvis w/ IV Cont (09.05.21 @ 16:02) >    IMPRESSION:  No acute findings within the abdomen or pelvis.      < end of copied text >

## 2021-09-07 NOTE — PROGRESS NOTE ADULT - SUBJECTIVE AND OBJECTIVE BOX
Patient is a 51y old  Female who presents with a chief complaint of back pain/poor po intake (05 Sep 2021 21:41)      9/7/21  LBP ++  also reports headaches, generalized pain. Afebrile    PAST MEDICAL & SURGICAL HISTORY:  Lupus    Sickle cell disease    No significant past surgical history        Review of Systems:   CONSTITUTIONAL: No fever, weight loss, or fatigue  EYES: No eye pain, visual disturbances, or discharge  ENMT:  No difficulty hearing, tinnitus, vertigo; No sinus or throat pain  NECK: No pain or stiffness  BREASTS: No pain, masses, or nipple discharge  RESPIRATORY: No cough, wheezing, chills or hemoptysis; No shortness of breath  CARDIOVASCULAR: No chest pain, palpitations, dizziness, or leg swelling  GASTROINTESTINAL: No abdominal or epigastric pain. No nausea, vomiting, or hematemesis; No diarrhea or constipation. No melena or hematochezia.  GENITOURINARY: No dysuria, frequency, hematuria, or incontinence  NEUROLOGICAL: No headaches, memory loss, loss of strength, numbness, or tremors  SKIN: No itching, burning, rashes, or lesions   LYMPH NODES: No enlarged glands  ENDOCRINE: No heat or cold intolerance; No hair loss  MUSCULOSKELETAL: No joint pain or swelling  PSYCHIATRIC: No depression, anxiety, mood swings, or difficulty sleeping  HEME/LYMPH: No easy bruising, or bleeding gums  ALLERY AND IMMUNOLOGIC: No hives or eczema    Allergies    No Known Allergies    Intolerances        Social History:     FAMILY HISTORY:  No pertinent family history in first degree relatives        MEDICATIONS  (STANDING):  heparin  Infusion.  Unit(s)/Hr (11 mL/Hr) IV Continuous <Continuous>  lidocaine   4% Patch 1 Patch Transdermal daily  mycophenolate mofetil 1000 milliGRAM(s) Oral two times a day  predniSONE   Tablet 5 milliGRAM(s) Oral daily  sodium chloride 0.9%. 1000 milliLiter(s) (100 mL/Hr) IV Continuous <Continuous>    MEDICATIONS  (PRN):  acetaminophen   Tablet .. 650 milliGRAM(s) Oral every 6 hours PRN Temp greater or equal to 38.5C (101.3F), Mild Pain (1 - 3)  aluminum hydroxide/magnesium hydroxide/simethicone Suspension 30 milliLiter(s) Oral every 4 hours PRN Dyspepsia  heparin   Injectable 5000 Unit(s) IV Push every 6 hours PRN For aPTT less than 40  heparin   Injectable 2500 Unit(s) IV Push every 6 hours PRN For aPTT between 40 - 57  melatonin 3 milliGRAM(s) Oral at bedtime PRN Insomnia  ondansetron Injectable 4 milliGRAM(s) IV Push every 8 hours PRN Nausea and/or Vomiting        CAPILLARY BLOOD GLUCOSE        I&O's Summary    06 Sep 2021 07:01  -  06 Sep 2021 23:45  --------------------------------------------------------  IN: 1266 mL / OUT: 250 mL / NET: 1016 mL        PHYSICAL EXAM:  Vital Signs Last 24 Hrs  T(C): 37.1 (06 Sep 2021 21:59), Max: 37.1 (06 Sep 2021 17:14)  T(F): 98.8 (06 Sep 2021 21:59), Max: 98.8 (06 Sep 2021 21:59)  HR: 93 (06 Sep 2021 21:59) (78 - 93)  BP: 112/61 (06 Sep 2021 21:59) (103/64 - 118/65)  BP(mean): --  RR: 18 (06 Sep 2021 21:59) (18 - 18)  SpO2: 97% (06 Sep 2021 21:59) (96% - 98%)    GENERAL: NAD, well-developed  HEAD:  Atraumatic, Normocephalic  EYES: EOMI, PERRLA, conjunctiva and sclera clear  NECK: Supple, No JVD  CHEST/LUNG: Clear to auscultation bilaterally; No wheeze  HEART: Regular rate and rhythm; No murmurs, rubs, or gallops  ABDOMEN: Soft, Nontender, Nondistended; Bowel sounds present  EXTREMITIES:  2+ Peripheral Pulses, No clubbing, cyanosis, or edema  PSYCH: AAOx3  NEUROLOGY: non-focal  SKIN: No rashes or lesions    LABS:                        7.2    11.00 )-----------( 249      ( 06 Sep 2021 20:22 )             21.5     09-06    140  |  104  |  12  ----------------------------<  100<H>  3.5   |  23  |  0.76    Ca    9.5      06 Sep 2021 07:10    TPro  7.2  /  Alb  4.1  /  TBili  1.6<H>  /  DBili  x   /  AST  16  /  ALT  16  /  AlkPhos  150<H>  09-06    PT/INR - ( 06 Sep 2021 07:10 )   PT: 14.3 sec;   INR: 1.27 ratio         PTT - ( 06 Sep 2021 20:22 )  PTT:151.5 sec      Urinalysis Basic - ( 05 Sep 2021 19:00 )    Color: Yellow / Appearance: Clear / SG: >1.050 / pH: x  Gluc: x / Ketone: Trace  / Bili: Negative / Urobili: 3 mg/dL   Blood: x / Protein: Trace / Nitrite: Negative   Leuk Esterase: Negative / RBC: 1 /HPF / WBC 3 /HPF   Sq Epi: x / Non Sq Epi: 0 /HPF / Bacteria: Negative        RADIOLOGY & ADDITIONAL TESTS:    Imaging Personally Reviewed:    Consultant(s) Notes Reviewed:      Care Discussed with Consultants/Other Providers:

## 2021-09-07 NOTE — CONSULT NOTE ADULT - ATTENDING COMMENTS
pt seen and examined by me personally   agree with above  it's not clear to us how the dx of SLE was made/supported and pt reports her hematologist believes the SCD is why she has pain  back pain not typical for SLE flare   will follow with you   no indication for increasing steroids at this time

## 2021-09-07 NOTE — CONSULT NOTE ADULT - ASSESSMENT
Patient is a 51F with PMHx APLS (on coumadin), undifferentiated CTD, sickle cell trait, Hb C presenting for acute low back pain, decreased PO x1 week. CT L-spine, A/P negative. Found to have hemolytic anemia. Rheumatology consulted for r/o flare    #Low Back Pain: Patient with low back pain x1 week found to have hemolytic anemia. Patient with ROS also positive for weight loss, alopecia photosensitive rash, xerophthalmia, joint stiffness. Negative CT scans r/o mechanical low back pain. Labs significant for hemolytic anemia at this time. Cannot r/o flare of undifferentiated CTD at this time, however in the setting of known Hb SC sickle trait differential can also consider flare?  -recommend Heme consult for   -f/u YAJAIRA, LYLE, dsDNA, complements, SSA, SSB, APLS labs, SPEP, UPEP, Hb electropheresis (ordered)  -f/u direct shi given hemolytic anemia (ordered)    #Undifferntiated CTD:  In 2012, patient had lethargy, alopecia, arthritis with Jacoud changes, arthropathy b/l hands, weight loss was dx with undifferentiated CTD, patient's symptoms responded to Plaquenil. In 2018, CellCept was started. Baseline Hb 10-11. Negative labs include YAJAIRA, LYLE, dsDNA, SSA, SSB, Complements, ANCA, CCP, RF, anti-histone, B2GP, ACL, LAC from [facility] chart review [id number]. Immunoglobulins 2180 and IgG4 142 in 2016 with repeat IgG4 . SLE cannot be diagnosed in the setting of negative YAJAIRA. It is unclear at this time whether CTD is contributing to her presentation at this time  -c/w home prednisone and cellcept  -w/u as described above    #APLS: Patient was diagnosed in 1992 s.p stroke, was started on coumadin. In 2001 patient saw a hematologist, whoc recommended d/cing coumadin given antibodies negative and had vitreous hemorrhage. Patient was clinically ok until 2009 when she was diagnosed with b/l PE and coumadin was restarted. 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 It is also documented that she failed coumadin and was started on pradaxa, however patient reports currently being on coumadin. INR subtherapeutic on admission, c/f medication nonadherence?   -c/w home meds  -w/u as described above    Case discussed with Dr. Isaiah Vieira D.O.  Rheumatology Fellow PGY 4  Pager [phone] Patient is a 51F with PMHx APLS (on coumadin), undifferentiated CTD, sickle cell trait, Hb C presenting for acute low back pain, decreased PO x1 week. CT L-spine, A/P negative. Found to have hemolytic anemia. Rheumatology consulted for r/o flare    #Low Back Pain: Patient with low back pain x1 week found to have hemolytic anemia. Patient with ROS also positive for weight loss, alopecia photosensitive rash, xerophthalmia, joint stiffness. Negative CT scans r/o mechanical low back pain. Labs significant for hemolytic anemia at this time. Cannot r/o flare of undifferentiated CTD at this time, however in the setting of known Hb SC sickle trait differential can also consider flare?  -recommend Heme consult for   -f/u YAJAIRA, LYLE, dsDNA, complements, SSA, SSB, APLS labs, SPEP, UPEP, Hb electropheresis (ordered)  -f/u direct shi given hemolytic anemia (ordered)  -pain control per pain management  -MRI lumbar spine pending    #Undifferntiated CTD:  In 2012, patient had lethargy, alopecia, arthritis with Jacoud changes, arthropathy b/l hands, weight loss was dx with undifferentiated CTD, patient's symptoms responded to Plaquenil. In 2018, CellCept was started. Baseline Hb 10-11. Negative labs include YAJAIRA, LYLE, dsDNA, SSA, SSB, Complements, ANCA, CCP, RF, anti-histone, B2GP, ACL, LAC from [facility] chart review [id number]. Immunoglobulins 2180 and IgG4 142 in 2016 with repeat IgG4 . SLE cannot be diagnosed in the setting of negative YAJAIRA. It is unclear at this time whether CTD is contributing to her presentation at this time  -c/w home prednisone and cellcept  -w/u as described above    #APLS: Patient was diagnosed in 1992 s.p stroke, was started on coumadin. In 2001 patient saw a hematologist, who recommended d/cing coumadin given antibodies negative and had vitreous hemorrhage. Patient was clinically ok until 2009 when she was diagnosed with b/l PE and coumadin was restarted. 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 It is also documented that she failed coumadin and was started on pradaxa, however patient reports currently being on coumadin. INR subtherapeutic on admission, c/f medication nonadherence?   -c/w home meds  -w/u as described above    Case discussed with Dr. Isaiah Vieira D.O.  Rheumatology Fellow PGY 4  Pager [phone] Patient is a 51F with PMHx APLS (on coumadin), undifferentiated CTD, sickle cell trait, Hb C presenting for acute low back pain, decreased PO x1 week. CT L-spine, A/P negative. Found to have hemolytic anemia. Rheumatology consulted for r/o flare    #Low Back Pain: Patient with low back pain x1 week found to have hemolytic anemia. Patient with ROS also positive for weight loss, alopecia photosensitive rash, xerophthalmia, joint stiffness. Negative CT scans r/o mechanical low back pain. Labs significant for hemolytic anemia at this time. Cannot r/o flare of undifferentiated CTD at this time, however in the setting of known Hb SC sickle trait differential can also consider flare?  -recommend Heme consult  -f/u YAJAIRA, LYLE, dsDNA, complements, SSA, SSB, APLS labs, SPEP, UPEP, Hb electropheresis (ordered)  -f/u direct shi given hemolytic anemia (ordered)  -pain control per pain management  -MRI lumbar spine pending    #Undifferntiated CTD:  In 2012, patient had lethargy, alopecia, arthritis with Jacoud changes, arthropathy b/l hands, weight loss was dx with undifferentiated CTD, patient's symptoms responded to Plaquenil. In 2018, CellCept was started. Baseline Hb 10-11. Negative labs include YAJAIRA, LYLE, dsDNA, SSA, SSB, Complements, ANCA, CCP, RF, anti-histone, B2GP, ACL, LAC from [facility] chart review [id number]. Immunoglobulins 2180 and IgG4 142 in 2016 with repeat IgG4 . SLE cannot be diagnosed in the setting of negative YAJAIRA. It is unclear at this time whether CTD is contributing to her presentation at this time  -c/w home prednisone and cellcept  -w/u as described above    #APLS: Patient was diagnosed in 1992 s.p stroke, was started on coumadin. In 2001 patient saw a hematologist, whoc recommended d/cing coumadin given antibodies negative and had vitreous hemorrhage. Patient was clinically ok until 2009 when she was diagnosed with b/l PE and coumadin was restarted. 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 It is also documented that she failed coumadin and was started on pradaxa, however patient reports currently being on coumadin. INR subtherapeutic on admission, c/f medication nonadherence?   -c/w home meds  -w/u as described above    Case discussed with Dr. Isaiah Vieira D.O.  Rheumatology Fellow PGY 4  Pager [phone]

## 2021-09-07 NOTE — PHYSICAL THERAPY INITIAL EVALUATION ADULT - GENERAL OBSERVATIONS, REHAB EVAL
Patient received semi supine in bed , (+) IV, c/o severe back pain / generalized body pain with activities/ movements , deferred ambulation at this time due to pain , MARIZOL Farrar made aware.

## 2021-09-07 NOTE — PHYSICAL THERAPY INITIAL EVALUATION ADULT - PERTINENT HX OF CURRENT PROBLEM, REHAB EVAL
This is a 51 yr old female with a pmh of lupus, Sickle Cell Disease, hx of PE who presents with a one week history of lower back pain that is constant and worsening.

## 2021-09-07 NOTE — CONSULT NOTE ADULT - SUBJECTIVE AND OBJECTIVE BOX
Chief Complaint: unrelieved low back pain and generalized body pain.    HPI:  51 yr old female with a pmh of lupus, SCD, hx of PE on warfarin who presents with a one week history of lower back pain that is constant and worsening. Sitting still makes the pain better. Otherwise describes it as 20/10. Hurts to move her legs with active ROM but passive ROM ellicits no pain. Denies fecal or urinary incontinence Denies leg weakness. Also reports since cholecystectomy in July has not really felt like eating.      Denies  headache, dizziness, chest pain, palpitations, SOB, abdominal pain, joint pain, diarrhea/constipation, urinary symptoms.   Vitals in the ED Tmax 99.1, , /81, RR 17 satting 100% RA (05 Sep 2021 21:41)      PAST MEDICAL & SURGICAL HISTORY:  Lupus  Sickle cell disease  No significant past surgical history    FAMILY HISTORY:  No pertinent family history in first degree relatives    Allergies  No Known Allergies    PAIN MEDICATIONS:  acetaminophen   Tablet .. 650 milliGRAM(s) Oral every 6 hours PRN  cyclobenzaprine 5 milliGRAM(s) Oral every 8 hours PRN  melatonin 3 milliGRAM(s) Oral at bedtime PRN  ondansetron Injectable 4 milliGRAM(s) IV Push every 8 hours PRN    Heme:  heparin   Injectable 5000 Unit(s) IV Push every 6 hours PRN  heparin   Injectable 2500 Unit(s) IV Push every 6 hours PRN  heparin  Infusion.  Unit(s)/Hr IV Continuous <Continuous>  warfarin 7.5 milliGRAM(s) Oral once    GI:  aluminum hydroxide/magnesium hydroxide/simethicone Suspension 30 milliLiter(s) Oral every 4 hours PRN  pantoprazole    Tablet 40 milliGRAM(s) Oral before breakfast    Endocrine:  predniSONE   Tablet 5 milliGRAM(s) Oral daily    All Other Medications:  lidocaine   4% Patch 1 Patch Transdermal daily  mycophenolate mofetil 1000 milliGRAM(s) Oral two times a day  potassium phosphate / sodium phosphate Tablet (K-PHOS No. 2) 1 Tablet(s) Oral three times a day with meals  sodium chloride 0.9%. 1000 milliLiter(s) IV Continuous <Continuous>      Vital Signs Last 24 Hrs  T(C): 36.3 (07 Sep 2021 14:21), Max: 37.2 (07 Sep 2021 05:27)  T(F): 97.4 (07 Sep 2021 14:21), Max: 98.9 (07 Sep 2021 05:27)  HR: 89 (07 Sep 2021 14:21) (86 - 93)  BP: 121/64 (07 Sep 2021 14:21) (112/61 - 127/56)  BP(mean): --  RR: 18 (07 Sep 2021 14:21) (18 - 18)  SpO2: 99% (07 Sep 2021 14:21) (96% - 99%)    PAIN SCORE:   10/10      SCALE USED: (1-10 VNRS)           LABS:                          8.2    9.97  )-----------( 279      ( 07 Sep 2021 09:48 )             21.5     09-07    143  |  109<H>  |  5<L>  ----------------------------<  175<H>  3.3<L>   |  22  |  0.55    Ca    8.9      07 Sep 2021 09:48  Phos  2.4     09-07  Mg     1.60     09-07    TPro  6.5  /  Alb  3.7  /  TBili  1.1  /  DBili  0.3<H>  /  AST  15  /  ALT  16  /  AlkPhos  118  09-07    PT/INR - ( 07 Sep 2021 09:48 )   PT: 15.4 sec;   INR: 1.36 ratio         PTT - ( 07 Sep 2021 09:48 )  PTT:97.5 sec  Urinalysis Basic - ( 05 Sep 2021 19:00 )    Color: Yellow / Appearance: Clear / SG: >1.050 / pH: x  Gluc: x / Ketone: Trace  / Bili: Negative / Urobili: 3 mg/dL   Blood: x / Protein: Trace / Nitrite: Negative   Leuk Esterase: Negative / RBC: 1 /HPF / WBC 3 /HPF   Sq Epi: x / Non Sq Epi: 0 /HPF / Bacteria: Negative    [x ]  [place]  Reviewed     Summary:  Patient seen at the bedside.  Patient is complaining of 10/10 lower back pain and generalized body pain.  Patient has a history of sickle cell disease but never had a crisis, and history of Lupus.  The patient stated that 2 weeks ago she started to have low back pain, and went to see her rheumatologist. The patient was given Prednisone and Cellcept which offered no relief.  The patient has had low back pain before, but never this bad.  The patient states her back pain is 10/10, and she also has generalized body pain as well.  THe patient describes her low back pain as burning and stabbing, and it is aggravated by movement.  The patient cannot recall having a fever and is COVID negative.   The patient does not like taking opioids because it can cause her to become nauseous.  Patient needs to be premedicated with IV Zofran.      PHYSICAL EXAM:  GENERAL: Alert & Oriented x 3 in NAD, well-groomed, well-developed     Impression/Plan: Requested by Medicine to help manage low back pain.   Recommendations:  -  Consider changing po Tylenol order to po Tylenol 650 mg every 6 hours standing x2 days, then PRN for pain.  -  Consider ordering IV Toradol 15 mg q 6 hours standing x2 days, then PRN for pain.  Please alternate with Tylenol.  -  Consider ordering  Flexeril 5 mg every 8 hours PRN for muscle spasm.   -  Consider ordering Gabapentin 200 mg every 12 hours.  Hold for oversedation.  -  Consider ordering  po Oxycodone 5mg every 6 hours PRN for moderate to severe pain.  Hold for oversedation. May need to premedicate with IV Zofran.   -  Recommend maintaining continuous pulse oximetry.  -  Recommend follow up with Rheumatology for possible lupus flare up or SCC.  Patient has generalized body pain.  -  Recommend follow up with Chronic Pain doctor when discharged. If patient does not have a Chronic Pain doctor, may acquire one through patient's personal insurance carrier.  Discussed patient with Chronic Pain Attending on call, Dr. Maza, who agrees with the above recommendations.  No further recommendations at this time, Chronic pain service to sign off. May call Chronic Pain Service if needed.   Thank you.

## 2021-09-08 LAB
ALBUMIN SERPL ELPH-MCNC: 3.5 G/DL — SIGNIFICANT CHANGE UP (ref 3.3–5)
ALP SERPL-CCNC: 109 U/L — SIGNIFICANT CHANGE UP (ref 40–120)
ALT FLD-CCNC: 14 U/L — SIGNIFICANT CHANGE UP (ref 4–33)
ANA TITR SER: NEGATIVE — SIGNIFICANT CHANGE UP
ANION GAP SERPL CALC-SCNC: 13 MMOL/L — SIGNIFICANT CHANGE UP (ref 7–14)
ANTI-RIBONUCLEAR PROTEIN: <0.2 AI — SIGNIFICANT CHANGE UP
APTT BLD: 105 SEC — HIGH (ref 27–36.3)
APTT BLD: 81.2 SEC — HIGH (ref 27–36.3)
APTT BLD: 81.5 SEC — HIGH (ref 27–36.3)
APTT BLD: 88.5 SEC — HIGH (ref 27–36.3)
AST SERPL-CCNC: 12 U/L — SIGNIFICANT CHANGE UP (ref 4–32)
BILIRUB DIRECT SERPL-MCNC: 0.3 MG/DL — HIGH (ref 0–0.2)
BILIRUB INDIRECT FLD-MCNC: 0.8 MG/DL — SIGNIFICANT CHANGE UP (ref 0–1)
BILIRUB SERPL-MCNC: 1.1 MG/DL — SIGNIFICANT CHANGE UP (ref 0.2–1.2)
BUN SERPL-MCNC: 5 MG/DL — LOW (ref 7–23)
C3 SERPL-MCNC: 113 MG/DL — SIGNIFICANT CHANGE UP (ref 90–180)
C4 SERPL-MCNC: 32 MG/DL — SIGNIFICANT CHANGE UP (ref 10–40)
CALCIUM SERPL-MCNC: 9 MG/DL — SIGNIFICANT CHANGE UP (ref 8.4–10.5)
CHLORIDE SERPL-SCNC: 112 MMOL/L — HIGH (ref 98–107)
CO2 SERPL-SCNC: 20 MMOL/L — LOW (ref 22–31)
CREAT SERPL-MCNC: 0.64 MG/DL — SIGNIFICANT CHANGE UP (ref 0.5–1.3)
DAT POLY-SP REAG RBC QL: NEGATIVE — SIGNIFICANT CHANGE UP
DSDNA AB FLD-ACNC: <0.2 AI — SIGNIFICANT CHANGE UP
ENA SM AB FLD QL: <0.2 AI — SIGNIFICANT CHANGE UP
ENA SS-A AB FLD IA-ACNC: <0.2 AI — SIGNIFICANT CHANGE UP
GLUCOSE SERPL-MCNC: 112 MG/DL — HIGH (ref 70–99)
HCT VFR BLD CALC: 23 % — LOW (ref 34.5–45)
HEMOGLOBIN INTERPRETATION: SIGNIFICANT CHANGE UP
HEMOGLOBIN INTERPRETATION: SIGNIFICANT CHANGE UP
HGB A MFR BLD: 0 % — LOW
HGB A MFR BLD: 0 % — LOW
HGB A2 MFR BLD: 3.6 % — HIGH (ref 2.4–3.5)
HGB A2 MFR BLD: 3.6 % — HIGH (ref 2.4–3.5)
HGB BLD-MCNC: 7.7 G/DL — LOW (ref 11.5–15.5)
HGB C MFR BLD: 46.6 % — HIGH
HGB C MFR BLD: 46.9 % — HIGH
HGB F MFR BLD: 1.8 % — HIGH (ref 0–1.5)
HGB F MFR BLD: 1.8 % — HIGH (ref 0–1.5)
HGB S BLD QL: POSITIVE
HGB S BLD QL: POSITIVE
HGB S MFR BLD: 47.7 % — HIGH
HGB S MFR BLD: 48 % — HIGH
INR BLD: 1.46 RATIO — HIGH (ref 0.88–1.16)
INR BLD: 1.63 RATIO — HIGH (ref 0.88–1.16)
LUPUS ANTICOAGULANT PROFILE RESULT: SIGNIFICANT CHANGE UP
MAGNESIUM SERPL-MCNC: 1.6 MG/DL — SIGNIFICANT CHANGE UP (ref 1.6–2.6)
MCHC RBC-ENTMCNC: 30.7 PG — SIGNIFICANT CHANGE UP (ref 27–34)
MCHC RBC-ENTMCNC: 33.4 GM/DL — SIGNIFICANT CHANGE UP (ref 32–36)
MCV RBC AUTO: 91.5 FL — SIGNIFICANT CHANGE UP (ref 80–100)
NRBC # BLD: 5 /100 WBCS — SIGNIFICANT CHANGE UP
NRBC # FLD: 0.53 K/UL — HIGH
PHOSPHATE SERPL-MCNC: 3.6 MG/DL — SIGNIFICANT CHANGE UP (ref 2.5–4.5)
PLATELET # BLD AUTO: 245 K/UL — SIGNIFICANT CHANGE UP (ref 150–400)
POTASSIUM SERPL-MCNC: 3.7 MMOL/L — SIGNIFICANT CHANGE UP (ref 3.5–5.3)
POTASSIUM SERPL-SCNC: 3.7 MMOL/L — SIGNIFICANT CHANGE UP (ref 3.5–5.3)
PROT SERPL-MCNC: 6.2 G/DL — SIGNIFICANT CHANGE UP (ref 6–8.3)
PROT SERPL-MCNC: 6.2 G/DL — SIGNIFICANT CHANGE UP (ref 6–8.3)
PROTHROM AB SERPL-ACNC: 16.3 SEC — HIGH (ref 10.6–13.6)
PROTHROM AB SERPL-ACNC: 18.3 SEC — HIGH (ref 10.6–13.6)
RBC # BLD: 2.24 M/UL — LOW (ref 3.8–5.2)
RBC # FLD: 13.5 % — SIGNIFICANT CHANGE UP (ref 10.3–14.5)
RHEUMATOID FACT SERPL-ACNC: <10 IU/ML — SIGNIFICANT CHANGE UP (ref 0–13)
SODIUM SERPL-SCNC: 145 MMOL/L — SIGNIFICANT CHANGE UP (ref 135–145)
SOLUBILITY: POSITIVE
SOLUBILITY: POSITIVE
WBC # BLD: 9.89 K/UL — SIGNIFICANT CHANGE UP (ref 3.8–10.5)
WBC # FLD AUTO: 9.89 K/UL — SIGNIFICANT CHANGE UP (ref 3.8–10.5)

## 2021-09-08 PROCEDURE — 83020 HEMOGLOBIN ELECTROPHORESIS: CPT | Mod: 26

## 2021-09-08 PROCEDURE — 84165 PROTEIN E-PHORESIS SERUM: CPT | Mod: 26

## 2021-09-08 RX ORDER — SODIUM CHLORIDE 9 MG/ML
250 INJECTION INTRAMUSCULAR; INTRAVENOUS; SUBCUTANEOUS ONCE
Refills: 0 | Status: COMPLETED | OUTPATIENT
Start: 2021-09-08 | End: 2021-09-08

## 2021-09-08 RX ORDER — WARFARIN SODIUM 2.5 MG/1
7.5 TABLET ORAL ONCE
Refills: 0 | Status: COMPLETED | OUTPATIENT
Start: 2021-09-08 | End: 2021-09-08

## 2021-09-08 RX ADMIN — Medication 5 MILLIGRAM(S): at 06:12

## 2021-09-08 RX ADMIN — PANTOPRAZOLE SODIUM 40 MILLIGRAM(S): 20 TABLET, DELAYED RELEASE ORAL at 06:12

## 2021-09-08 RX ADMIN — Medication 15 MILLIGRAM(S): at 12:55

## 2021-09-08 RX ADMIN — Medication 15 MILLIGRAM(S): at 06:15

## 2021-09-08 RX ADMIN — HEPARIN SODIUM 700 UNIT(S)/HR: 5000 INJECTION INTRAVENOUS; SUBCUTANEOUS at 08:40

## 2021-09-08 RX ADMIN — Medication 15 MILLIGRAM(S): at 17:06

## 2021-09-08 RX ADMIN — Medication 650 MILLIGRAM(S): at 21:38

## 2021-09-08 RX ADMIN — Medication 650 MILLIGRAM(S): at 16:01

## 2021-09-08 RX ADMIN — HEPARIN SODIUM 600 UNIT(S)/HR: 5000 INJECTION INTRAVENOUS; SUBCUTANEOUS at 22:39

## 2021-09-08 RX ADMIN — MYCOPHENOLATE MOFETIL 1000 MILLIGRAM(S): 250 CAPSULE ORAL at 17:06

## 2021-09-08 RX ADMIN — Medication 650 MILLIGRAM(S): at 10:36

## 2021-09-08 RX ADMIN — Medication 1 TABLET(S): at 12:56

## 2021-09-08 RX ADMIN — HEPARIN SODIUM 600 UNIT(S)/HR: 5000 INJECTION INTRAVENOUS; SUBCUTANEOUS at 16:01

## 2021-09-08 RX ADMIN — Medication 15 MILLIGRAM(S): at 23:01

## 2021-09-08 RX ADMIN — SODIUM CHLORIDE 250 MILLILITER(S): 9 INJECTION INTRAMUSCULAR; INTRAVENOUS; SUBCUTANEOUS at 14:23

## 2021-09-08 RX ADMIN — Medication 1 TABLET(S): at 17:06

## 2021-09-08 RX ADMIN — WARFARIN SODIUM 7.5 MILLIGRAM(S): 2.5 TABLET ORAL at 17:06

## 2021-09-08 RX ADMIN — Medication 1 TABLET(S): at 07:40

## 2021-09-08 RX ADMIN — MYCOPHENOLATE MOFETIL 1000 MILLIGRAM(S): 250 CAPSULE ORAL at 06:12

## 2021-09-08 RX ADMIN — GABAPENTIN 200 MILLIGRAM(S): 400 CAPSULE ORAL at 17:06

## 2021-09-08 RX ADMIN — GABAPENTIN 200 MILLIGRAM(S): 400 CAPSULE ORAL at 06:14

## 2021-09-08 RX ADMIN — HEPARIN SODIUM 700 UNIT(S)/HR: 5000 INJECTION INTRAVENOUS; SUBCUTANEOUS at 00:59

## 2021-09-08 RX ADMIN — Medication 650 MILLIGRAM(S): at 02:24

## 2021-09-08 NOTE — PROVIDER CONTACT NOTE (OTHER) - BACKGROUND
Patient admitted with dx of lower back pain
Pt admitted for LBP, HX of PE currently on coumadin and heparin gtt.

## 2021-09-08 NOTE — PROGRESS NOTE ADULT - SUBJECTIVE AND OBJECTIVE BOX
Patient is a 51y old  Female who presents with a chief complaint of back pain/poor po intake (05 Sep 2021 21:41)      9/8/21  LBP ++  with headaches, generalized pain. Afebrile    PAST MEDICAL & SURGICAL HISTORY:  Lupus    Sickle cell disease    No significant past surgical history        Review of Systems:   CONSTITUTIONAL: No fever, weight loss, or fatigue  EYES: No eye pain, visual disturbances, or discharge  ENMT:  No difficulty hearing, tinnitus, vertigo; No sinus or throat pain  NECK: No pain or stiffness  BREASTS: No pain, masses, or nipple discharge  RESPIRATORY: No cough, wheezing, chills or hemoptysis; No shortness of breath  CARDIOVASCULAR: No chest pain, palpitations, dizziness, or leg swelling  GASTROINTESTINAL: No abdominal or epigastric pain. No nausea, vomiting, or hematemesis; No diarrhea or constipation. No melena or hematochezia.  GENITOURINARY: No dysuria, frequency, hematuria, or incontinence  NEUROLOGICAL: No headaches, memory loss, loss of strength, numbness, or tremors  SKIN: No itching, burning, rashes, or lesions   LYMPH NODES: No enlarged glands  ENDOCRINE: No heat or cold intolerance; No hair loss  MUSCULOSKELETAL: gen pain  PSYCHIATRIC: No depression, anxiety, mood swings, or difficulty sleeping  HEME/LYMPH: No easy bruising, or bleeding gums  ALLERY AND IMMUNOLOGIC: No hives or eczema    Allergies    No Known Allergies    Intolerances        Social History:     FAMILY HISTORY:  No pertinent family history in first degree relatives        MEDICATIONS  (STANDING):  heparin  Infusion.  Unit(s)/Hr (11 mL/Hr) IV Continuous <Continuous>  lidocaine   4% Patch 1 Patch Transdermal daily  mycophenolate mofetil 1000 milliGRAM(s) Oral two times a day  predniSONE   Tablet 5 milliGRAM(s) Oral daily  sodium chloride 0.9%. 1000 milliLiter(s) (100 mL/Hr) IV Continuous <Continuous>    MEDICATIONS  (PRN):  acetaminophen   Tablet .. 650 milliGRAM(s) Oral every 6 hours PRN Temp greater or equal to 38.5C (101.3F), Mild Pain (1 - 3)  aluminum hydroxide/magnesium hydroxide/simethicone Suspension 30 milliLiter(s) Oral every 4 hours PRN Dyspepsia  heparin   Injectable 5000 Unit(s) IV Push every 6 hours PRN For aPTT less than 40  heparin   Injectable 2500 Unit(s) IV Push every 6 hours PRN For aPTT between 40 - 57  melatonin 3 milliGRAM(s) Oral at bedtime PRN Insomnia  ondansetron Injectable 4 milliGRAM(s) IV Push every 8 hours PRN Nausea and/or Vomiting        CAPILLARY BLOOD GLUCOSE        I&O's Summary    06 Sep 2021 07:01  -  06 Sep 2021 23:45  --------------------------------------------------------  IN: 1266 mL / OUT: 250 mL / NET: 1016 mL        PHYSICAL EXAM:  Vital Signs Last 24 Hrs  T(C): 37.1 (06 Sep 2021 21:59), Max: 37.1 (06 Sep 2021 17:14)  T(F): 98.8 (06 Sep 2021 21:59), Max: 98.8 (06 Sep 2021 21:59)  HR: 93 (06 Sep 2021 21:59) (78 - 93)  BP: 112/61 (06 Sep 2021 21:59) (103/64 - 118/65)  BP(mean): --  RR: 18 (06 Sep 2021 21:59) (18 - 18)  SpO2: 97% (06 Sep 2021 21:59) (96% - 98%)    GENERAL: NAD, well-developed  HEAD:  Atraumatic, Normocephalic  EYES: EOMI, PERRLA, conjunctiva and sclera clear  NECK: Supple, No JVD  CHEST/LUNG: Clear to auscultation bilaterally; No wheeze  HEART: Regular rate and rhythm; No murmurs, rubs, or gallops  ABDOMEN: Soft, Nontender, Nondistended; Bowel sounds present  EXTREMITIES:  2+ Peripheral Pulses, No clubbing, cyanosis, or edema  PSYCH: AAOx3  NEUROLOGY: non-focal  SKIN: No rashes or lesions    LABS:                        7.2    11.00 )-----------( 249      ( 06 Sep 2021 20:22 )             21.5     09-06    140  |  104  |  12  ----------------------------<  100<H>  3.5   |  23  |  0.76    Ca    9.5      06 Sep 2021 07:10    TPro  7.2  /  Alb  4.1  /  TBili  1.6<H>  /  DBili  x   /  AST  16  /  ALT  16  /  AlkPhos  150<H>  09-06    PT/INR - ( 06 Sep 2021 07:10 )   PT: 14.3 sec;   INR: 1.27 ratio         PTT - ( 06 Sep 2021 20:22 )  PTT:151.5 sec      Urinalysis Basic - ( 05 Sep 2021 19:00 )    Color: Yellow / Appearance: Clear / SG: >1.050 / pH: x  Gluc: x / Ketone: Trace  / Bili: Negative / Urobili: 3 mg/dL   Blood: x / Protein: Trace / Nitrite: Negative   Leuk Esterase: Negative / RBC: 1 /HPF / WBC 3 /HPF   Sq Epi: x / Non Sq Epi: 0 /HPF / Bacteria: Negative        RADIOLOGY & ADDITIONAL TESTS:    Imaging Personally Reviewed:    Consultant(s) Notes Reviewed:      Care Discussed with Consultants/Other Providers:

## 2021-09-08 NOTE — PROVIDER CONTACT NOTE (OTHER) - ASSESSMENT
patient AxOx4. VS stable. No s/s of distress or discomfort noted. patient is on heparin drip at 9ml/hr
Pt resting comfortably in bed, VS stable, Neuro exam unchanged and WDL. Pt ambulated with assistance to restroom with success.

## 2021-09-08 NOTE — PROVIDER CONTACT NOTE (OTHER) - SITUATION
On morning assessment pt c/o L sided stiffness. Pt states "I think I might've had a TIA yesterday morning." Pt also states she has a hx of TIA
APTT level 102.7 patient is on heparin drip at 9ml/hr

## 2021-09-08 NOTE — PROVIDER CONTACT NOTE (OTHER) - ACTION/TREATMENT ORDERED:
No treatment ordered at this time
As per provider Lucy, Heparin nomogram used and to decrease rate by 1ml/hr. Order initiated.

## 2021-09-08 NOTE — PROGRESS NOTE ADULT - SUBJECTIVE AND OBJECTIVE BOX
Call received from nurse that BP is 87 systolic. Will check orthostatic BPs. Patient also to get 250 ml fluid bolus. Continue to monitor BP.

## 2021-09-09 LAB
ANA TITR SER: NEGATIVE — SIGNIFICANT CHANGE UP
ANION GAP SERPL CALC-SCNC: 11 MMOL/L — SIGNIFICANT CHANGE UP (ref 7–14)
APTT BLD: 91.7 SEC — HIGH (ref 27–36.3)
B2 GLYCOPROT1 AB SER QL: NEGATIVE — SIGNIFICANT CHANGE UP
BUN SERPL-MCNC: 6 MG/DL — LOW (ref 7–23)
CALCIUM SERPL-MCNC: 8.9 MG/DL — SIGNIFICANT CHANGE UP (ref 8.4–10.5)
CARDIOLIPIN AB SER-ACNC: NEGATIVE — SIGNIFICANT CHANGE UP
CHLORIDE SERPL-SCNC: 111 MMOL/L — HIGH (ref 98–107)
CO2 SERPL-SCNC: 21 MMOL/L — LOW (ref 22–31)
CREAT SERPL-MCNC: 0.62 MG/DL — SIGNIFICANT CHANGE UP (ref 0.5–1.3)
DSDNA AB SER-ACNC: <12 IU/ML — SIGNIFICANT CHANGE UP
GLUCOSE SERPL-MCNC: 109 MG/DL — HIGH (ref 70–99)
HCT VFR BLD CALC: 19.8 % — CRITICAL LOW (ref 34.5–45)
HGB BLD-MCNC: 7.3 G/DL — LOW (ref 11.5–15.5)
INR BLD: 1.84 RATIO — HIGH (ref 0.88–1.16)
MCHC RBC-ENTMCNC: 34.1 PG — HIGH (ref 27–34)
MCHC RBC-ENTMCNC: 36.9 GM/DL — HIGH (ref 32–36)
MCV RBC AUTO: 92.5 FL — SIGNIFICANT CHANGE UP (ref 80–100)
NRBC # BLD: 6 /100 WBCS — SIGNIFICANT CHANGE UP
NRBC # FLD: 0.59 K/UL — HIGH
PLATELET # BLD AUTO: 264 K/UL — SIGNIFICANT CHANGE UP (ref 150–400)
POTASSIUM SERPL-MCNC: 3.4 MMOL/L — LOW (ref 3.5–5.3)
POTASSIUM SERPL-SCNC: 3.4 MMOL/L — LOW (ref 3.5–5.3)
PROT SERPL-MCNC: 6 G/DL — SIGNIFICANT CHANGE UP (ref 6–8.3)
PROTHROM AB SERPL-ACNC: 20.6 SEC — HIGH (ref 10.6–13.6)
RBC # BLD: 2.14 M/UL — LOW (ref 3.8–5.2)
RBC # FLD: 13.9 % — SIGNIFICANT CHANGE UP (ref 10.3–14.5)
SODIUM SERPL-SCNC: 143 MMOL/L — SIGNIFICANT CHANGE UP (ref 135–145)
WBC # BLD: 9.56 K/UL — SIGNIFICANT CHANGE UP (ref 3.8–10.5)
WBC # FLD AUTO: 9.56 K/UL — SIGNIFICANT CHANGE UP (ref 3.8–10.5)

## 2021-09-09 PROCEDURE — 99232 SBSQ HOSP IP/OBS MODERATE 35: CPT | Mod: GC

## 2021-09-09 RX ORDER — GABAPENTIN 400 MG/1
100 CAPSULE ORAL ONCE
Refills: 0 | Status: COMPLETED | OUTPATIENT
Start: 2021-09-09 | End: 2021-09-09

## 2021-09-09 RX ORDER — FOLIC ACID 0.8 MG
1 TABLET ORAL DAILY
Refills: 0 | Status: DISCONTINUED | OUTPATIENT
Start: 2021-09-09 | End: 2021-09-16

## 2021-09-09 RX ORDER — POTASSIUM CHLORIDE 20 MEQ
20 PACKET (EA) ORAL ONCE
Refills: 0 | Status: COMPLETED | OUTPATIENT
Start: 2021-09-09 | End: 2021-09-09

## 2021-09-09 RX ORDER — WARFARIN SODIUM 2.5 MG/1
7.5 TABLET ORAL ONCE
Refills: 0 | Status: COMPLETED | OUTPATIENT
Start: 2021-09-09 | End: 2021-09-09

## 2021-09-09 RX ADMIN — Medication 650 MILLIGRAM(S): at 17:52

## 2021-09-09 RX ADMIN — MYCOPHENOLATE MOFETIL 1000 MILLIGRAM(S): 250 CAPSULE ORAL at 05:44

## 2021-09-09 RX ADMIN — OXYCODONE HYDROCHLORIDE 5 MILLIGRAM(S): 5 TABLET ORAL at 21:21

## 2021-09-09 RX ADMIN — OXYCODONE HYDROCHLORIDE 5 MILLIGRAM(S): 5 TABLET ORAL at 22:15

## 2021-09-09 RX ADMIN — GABAPENTIN 200 MILLIGRAM(S): 400 CAPSULE ORAL at 13:00

## 2021-09-09 RX ADMIN — Medication 1 MILLIGRAM(S): at 17:53

## 2021-09-09 RX ADMIN — OXYCODONE HYDROCHLORIDE 5 MILLIGRAM(S): 5 TABLET ORAL at 10:00

## 2021-09-09 RX ADMIN — Medication 650 MILLIGRAM(S): at 05:43

## 2021-09-09 RX ADMIN — LIDOCAINE 1 PATCH: 4 CREAM TOPICAL at 18:10

## 2021-09-09 RX ADMIN — WARFARIN SODIUM 7.5 MILLIGRAM(S): 2.5 TABLET ORAL at 17:52

## 2021-09-09 RX ADMIN — Medication 5 MILLIGRAM(S): at 05:43

## 2021-09-09 RX ADMIN — Medication 20 MILLIEQUIVALENT(S): at 09:02

## 2021-09-09 RX ADMIN — PANTOPRAZOLE SODIUM 40 MILLIGRAM(S): 20 TABLET, DELAYED RELEASE ORAL at 05:43

## 2021-09-09 RX ADMIN — OXYCODONE HYDROCHLORIDE 5 MILLIGRAM(S): 5 TABLET ORAL at 09:02

## 2021-09-09 RX ADMIN — LIDOCAINE 1 PATCH: 4 CREAM TOPICAL at 12:53

## 2021-09-09 RX ADMIN — Medication 1 TABLET(S): at 08:00

## 2021-09-09 RX ADMIN — HEPARIN SODIUM 600 UNIT(S)/HR: 5000 INJECTION INTRAVENOUS; SUBCUTANEOUS at 04:49

## 2021-09-09 RX ADMIN — OXYCODONE HYDROCHLORIDE 5 MILLIGRAM(S): 5 TABLET ORAL at 15:18

## 2021-09-09 RX ADMIN — MYCOPHENOLATE MOFETIL 1000 MILLIGRAM(S): 250 CAPSULE ORAL at 17:52

## 2021-09-09 RX ADMIN — GABAPENTIN 100 MILLIGRAM(S): 400 CAPSULE ORAL at 01:02

## 2021-09-09 RX ADMIN — Medication 15 MILLIGRAM(S): at 12:53

## 2021-09-09 RX ADMIN — OXYCODONE HYDROCHLORIDE 5 MILLIGRAM(S): 5 TABLET ORAL at 16:33

## 2021-09-09 RX ADMIN — Medication 15 MILLIGRAM(S): at 05:45

## 2021-09-09 RX ADMIN — Medication 650 MILLIGRAM(S): at 09:12

## 2021-09-09 NOTE — PROGRESS NOTE ADULT - ATTENDING COMMENTS
pt seen and examined by me personally   agree with above   she is aware of our impression and plans by medicine/heme to treat for crisis  please recall as needed

## 2021-09-09 NOTE — PROGRESS NOTE ADULT - SUBJECTIVE AND OBJECTIVE BOX
INCOMPLETE NOTE    OVERNIGHT EVENTS:    SUBJECTIVE / INTERVAL HPI: Patient seen and examined at bedside.     VITAL SIGNS:  Vital Signs Last 24 Hrs  T(C): 36.9 (09 Sep 2021 09:36), Max: 37.1 (08 Sep 2021 21:35)  T(F): 98.5 (09 Sep 2021 09:36), Max: 98.7 (08 Sep 2021 21:35)  HR: 90 (09 Sep 2021 09:36) (76 - 90)  BP: 114/60 (09 Sep 2021 09:36) (87/60 - 124/62)  BP(mean): --  RR: 19 (09 Sep 2021 09:36) (16 - 19)  SpO2: 96% (09 Sep 2021 09:36) (96% - 100%)    PHYSICAL EXAM:    General: WDWN  HEENT: NC/AT; PERRL, anicteric sclera; MMM  Neck: supple  Cardiovascular: +S1/S2, RRR  Respiratory: CTA B/L; no W/R/R  Gastrointestinal: soft, NT/ND; +BSx4  Extremities: WWP; no edema, clubbing or cyanosis  Vascular: 2+ radial, DP/PT pulses B/L  Neurological: AAOx3; no focal deficits    MEDICATIONS:  MEDICATIONS  (STANDING):  acetaminophen   Tablet .. 650 milliGRAM(s) Oral every 6 hours  gabapentin 200 milliGRAM(s) Oral every 12 hours  heparin  Infusion.  Unit(s)/Hr (11 mL/Hr) IV Continuous <Continuous>  ketorolac   Injectable 15 milliGRAM(s) IV Push every 6 hours  lidocaine   4% Patch 1 Patch Transdermal daily  mycophenolate mofetil 1000 milliGRAM(s) Oral two times a day  pantoprazole    Tablet 40 milliGRAM(s) Oral before breakfast  predniSONE   Tablet 5 milliGRAM(s) Oral daily  sodium chloride 0.9%. 1000 milliLiter(s) (100 mL/Hr) IV Continuous <Continuous>    MEDICATIONS  (PRN):  aluminum hydroxide/magnesium hydroxide/simethicone Suspension 30 milliLiter(s) Oral every 4 hours PRN Dyspepsia  cyclobenzaprine 5 milliGRAM(s) Oral every 8 hours PRN Muscle Spasm  heparin   Injectable 5000 Unit(s) IV Push every 6 hours PRN For aPTT less than 40  heparin   Injectable 2500 Unit(s) IV Push every 6 hours PRN For aPTT between 40 - 57  melatonin 3 milliGRAM(s) Oral at bedtime PRN Insomnia  ondansetron Injectable 4 milliGRAM(s) IV Push every 8 hours PRN Nausea and/or Vomiting  oxyCODONE    IR 5 milliGRAM(s) Oral every 6 hours PRN Severe Pain (7 - 10)      ALLERGIES:  Allergies    No Known Allergies    Intolerances        LABS:                        7.3    9.56  )-----------( 264      ( 09 Sep 2021 04:28 )             19.8     09-09    143  |  111<H>  |  6<L>  ----------------------------<  109<H>  3.4<L>   |  21<L>  |  0.62    Ca    8.9      09 Sep 2021 04:28  Phos  3.6     09-08  Mg     1.60     09-08    TPro  6.0  /  Alb  x   /  TBili  x   /  DBili  x   /  AST  x   /  ALT  x   /  AlkPhos  x   09-09    PT/INR - ( 09 Sep 2021 04:28 )   PT: 20.6 sec;   INR: 1.84 ratio         PTT - ( 09 Sep 2021 04:28 )  PTT:91.7 sec    CAPILLARY BLOOD GLUCOSE          RADIOLOGY & ADDITIONAL TESTS: Reviewed. INCOMPLETE NOTE    SUBJECTIVE / INTERVAL HPI: Patient seen and examined at bedside. Patient reports persistent and same whole body, worst in the low back, b/l thighs, and b/l hips  VITAL SIGNS:  Vital Signs Last 24 Hrs  T(C): 36.9 (09 Sep 2021 09:36), Max: 37.1 (08 Sep 2021 21:35)  T(F): 98.5 (09 Sep 2021 09:36), Max: 98.7 (08 Sep 2021 21:35)  HR: 90 (09 Sep 2021 09:36) (76 - 90)  BP: 114/60 (09 Sep 2021 09:36) (87/60 - 124/62)  BP(mean): --  RR: 19 (09 Sep 2021 09:36) (16 - 19)  SpO2: 96% (09 Sep 2021 09:36) (96% - 100%)    PHYSICAL EXAM:    General: WDWN  HEENT: NC/AT; PERRL, anicteric sclera; MMM  Neck: supple  Cardiovascular: +S1/S2, RRR  Respiratory: CTA B/L; no W/R/R  Gastrointestinal: soft, NT/ND; +BSx4  Extremities: WWP; no edema, clubbing or cyanosis  Vascular: 2+ radial, DP/PT pulses B/L  Neurological: AAOx3; no focal deficits    MEDICATIONS:  MEDICATIONS  (STANDING):  acetaminophen   Tablet .. 650 milliGRAM(s) Oral every 6 hours  gabapentin 200 milliGRAM(s) Oral every 12 hours  heparin  Infusion.  Unit(s)/Hr (11 mL/Hr) IV Continuous <Continuous>  ketorolac   Injectable 15 milliGRAM(s) IV Push every 6 hours  lidocaine   4% Patch 1 Patch Transdermal daily  mycophenolate mofetil 1000 milliGRAM(s) Oral two times a day  pantoprazole    Tablet 40 milliGRAM(s) Oral before breakfast  predniSONE   Tablet 5 milliGRAM(s) Oral daily  sodium chloride 0.9%. 1000 milliLiter(s) (100 mL/Hr) IV Continuous <Continuous>    MEDICATIONS  (PRN):  aluminum hydroxide/magnesium hydroxide/simethicone Suspension 30 milliLiter(s) Oral every 4 hours PRN Dyspepsia  cyclobenzaprine 5 milliGRAM(s) Oral every 8 hours PRN Muscle Spasm  heparin   Injectable 5000 Unit(s) IV Push every 6 hours PRN For aPTT less than 40  heparin   Injectable 2500 Unit(s) IV Push every 6 hours PRN For aPTT between 40 - 57  melatonin 3 milliGRAM(s) Oral at bedtime PRN Insomnia  ondansetron Injectable 4 milliGRAM(s) IV Push every 8 hours PRN Nausea and/or Vomiting  oxyCODONE    IR 5 milliGRAM(s) Oral every 6 hours PRN Severe Pain (7 - 10)      ALLERGIES:  Allergies    No Known Allergies    Intolerances        LABS:                        7.3    9.56  )-----------( 264      ( 09 Sep 2021 04:28 )             19.8     09-09    143  |  111<H>  |  6<L>  ----------------------------<  109<H>  3.4<L>   |  21<L>  |  0.62    Ca    8.9      09 Sep 2021 04:28  Phos  3.6     09-08  Mg     1.60     09-08    TPro  6.0  /  Alb  x   /  TBili  x   /  DBili  x   /  AST  x   /  ALT  x   /  AlkPhos  x   09-09    PT/INR - ( 09 Sep 2021 04:28 )   PT: 20.6 sec;   INR: 1.84 ratio         PTT - ( 09 Sep 2021 04:28 )  PTT:91.7 sec    CAPILLARY BLOOD GLUCOSE          RADIOLOGY & ADDITIONAL TESTS: Reviewed. INCOMPLETE NOTE    SUBJECTIVE / INTERVAL HPI: Patient seen and examined at bedside. Patient reports persistent and same whole body, worst in the low back, b/l thighs, and b/l hips described to be 10/10, constant, sharp quality. Denies radiation of back pain to legs, muscle weakness, saddle anesthesia, fecal/urinary incontinence. Patient does report stiffness in the legs with standing from a seated position and walking. Patient denies issues with lifting arms overhead, arm stiffness, new HA. Reports decreased b/l visual acuity but no blindness. Admits to toe paresthesias for 2 days.  Denies fevers, chills, night sweats, chest pain, SOB, abdominal pain, n/v, changes in urinary freq, foamy urine, dysuria. Admits to constipation - last BM Sunday.    VITAL SIGNS:  Vital Signs Last 24 Hrs  T(C): 36.9 (09 Sep 2021 09:36), Max: 37.1 (08 Sep 2021 21:35)  T(F): 98.5 (09 Sep 2021 09:36), Max: 98.7 (08 Sep 2021 21:35)  HR: 90 (09 Sep 2021 09:36) (76 - 90)  BP: 114/60 (09 Sep 2021 09:36) (87/60 - 124/62)  BP(mean): --  RR: 19 (09 Sep 2021 09:36) (16 - 19)  SpO2: 96% (09 Sep 2021 09:36) (96% - 100%)    PHYSICAL EXAM:  Constitutional: WDWN resting comfortably in bed; NAD  Head: NC/AT. b/l temporal pulses 2+, no tenderness  Eyes: PERRL, clear conjunctiva  ENT: no nasal discharge; uvula midline, no oropharyngeal erythema or exudates; MMM. No oral ulcers  Neck: supple  Respiratory: CTA B/L; no W/R/R  Cardiac: +S1/S2; RRR  Gastrointestinal: soft, NT/ND; no rebound or guarding; +BSx4  Back: nontender to palpation over the spine. No CVA tenderness b/l   Extremities: WWP, no clubbing or cyanosis; no peripheral edema  Musculoskeletal: NROM x4; no joint tenderness or synovitis. b/l knees with small effusions  Dermatologic: skin warm, dry and intact; no rashes, wounds, or scars  Lymphatic: no submandibular or cervical LAD  Neurologic:  b/l LE 4/5, remainder of strength 5/5. Sensation symmetric and grossly intact throughout.     MEDICATIONS:  MEDICATIONS  (STANDING):  acetaminophen   Tablet .. 650 milliGRAM(s) Oral every 6 hours  gabapentin 200 milliGRAM(s) Oral every 12 hours  heparin  Infusion.  Unit(s)/Hr (11 mL/Hr) IV Continuous <Continuous>  ketorolac   Injectable 15 milliGRAM(s) IV Push every 6 hours  lidocaine   4% Patch 1 Patch Transdermal daily  mycophenolate mofetil 1000 milliGRAM(s) Oral two times a day  pantoprazole    Tablet 40 milliGRAM(s) Oral before breakfast  predniSONE   Tablet 5 milliGRAM(s) Oral daily  sodium chloride 0.9%. 1000 milliLiter(s) (100 mL/Hr) IV Continuous <Continuous>    MEDICATIONS  (PRN):  aluminum hydroxide/magnesium hydroxide/simethicone Suspension 30 milliLiter(s) Oral every 4 hours PRN Dyspepsia  cyclobenzaprine 5 milliGRAM(s) Oral every 8 hours PRN Muscle Spasm  heparin   Injectable 5000 Unit(s) IV Push every 6 hours PRN For aPTT less than 40  heparin   Injectable 2500 Unit(s) IV Push every 6 hours PRN For aPTT between 40 - 57  melatonin 3 milliGRAM(s) Oral at bedtime PRN Insomnia  ondansetron Injectable 4 milliGRAM(s) IV Push every 8 hours PRN Nausea and/or Vomiting  oxyCODONE    IR 5 milliGRAM(s) Oral every 6 hours PRN Severe Pain (7 - 10)      ALLERGIES:  Allergies    No Known Allergies    Intolerances        LABS:                        7.3    9.56  )-----------( 264      ( 09 Sep 2021 04:28 )             19.8     09-09    143  |  111<H>  |  6<L>  ----------------------------<  109<H>  3.4<L>   |  21<L>  |  0.62    Ca    8.9      09 Sep 2021 04:28  Phos  3.6     09-08  Mg     1.60     09-08    TPro  6.0  /  Alb  x   /  TBili  x   /  DBili  x   /  AST  x   /  ALT  x   /  AlkPhos  x   09-09    PT/INR - ( 09 Sep 2021 04:28 )   PT: 20.6 sec;   INR: 1.84 ratio         PTT - ( 09 Sep 2021 04:28 )  PTT:91.7 sec    CAPILLARY BLOOD GLUCOSE          RADIOLOGY & ADDITIONAL TESTS: Reviewed. SUBJECTIVE / INTERVAL HPI: Patient seen and examined at bedside. Patient reports persistent and same whole body, worst in the low back, b/l thighs, and b/l hips described to be 10/10, constant, sharp quality. Denies radiation of back pain to legs, muscle weakness, saddle anesthesia, fecal/urinary incontinence. Patient does report stiffness in the legs with standing from a seated position and walking. Patient denies issues with lifting arms overhead, arm stiffness, new HA. Reports decreased b/l visual acuity but no blindness. Admits to toe paresthesias for 2 days.  Denies fevers, chills, night sweats, chest pain, SOB, abdominal pain, n/v, changes in urinary freq, foamy urine, dysuria. Admits to constipation - last BM Sunday.    VITAL SIGNS:  Vital Signs Last 24 Hrs  T(C): 36.9 (09 Sep 2021 09:36), Max: 37.1 (08 Sep 2021 21:35)  T(F): 98.5 (09 Sep 2021 09:36), Max: 98.7 (08 Sep 2021 21:35)  HR: 90 (09 Sep 2021 09:36) (76 - 90)  BP: 114/60 (09 Sep 2021 09:36) (87/60 - 124/62)  BP(mean): --  RR: 19 (09 Sep 2021 09:36) (16 - 19)  SpO2: 96% (09 Sep 2021 09:36) (96% - 100%)    PHYSICAL EXAM:  Constitutional: WDWN resting comfortably in bed; NAD  Head: NC/AT. b/l temporal pulses 2+, no tenderness  Eyes: PERRL, clear conjunctiva  ENT: no nasal discharge; uvula midline, no oropharyngeal erythema or exudates; MMM. No oral ulcers  Neck: supple  Respiratory: CTA B/L; no W/R/R  Cardiac: +S1/S2; RRR  Gastrointestinal: soft, NT/ND; no rebound or guarding; +BSx4  Back: nontender to palpation over the spine. No CVA tenderness b/l   Extremities: WWP, no clubbing or cyanosis; no peripheral edema  Musculoskeletal: NROM x4; no joint tenderness or synovitis. b/l knees with small effusions  Dermatologic: skin warm, dry and intact; no rashes, wounds, or scars  Lymphatic: no submandibular or cervical LAD  Neurologic:  b/l LE 4/5, remainder of strength 5/5. Sensation symmetric and grossly intact throughout.     MEDICATIONS:  MEDICATIONS  (STANDING):  acetaminophen   Tablet .. 650 milliGRAM(s) Oral every 6 hours  gabapentin 200 milliGRAM(s) Oral every 12 hours  heparin  Infusion.  Unit(s)/Hr (11 mL/Hr) IV Continuous <Continuous>  ketorolac   Injectable 15 milliGRAM(s) IV Push every 6 hours  lidocaine   4% Patch 1 Patch Transdermal daily  mycophenolate mofetil 1000 milliGRAM(s) Oral two times a day  pantoprazole    Tablet 40 milliGRAM(s) Oral before breakfast  predniSONE   Tablet 5 milliGRAM(s) Oral daily  sodium chloride 0.9%. 1000 milliLiter(s) (100 mL/Hr) IV Continuous <Continuous>    MEDICATIONS  (PRN):  aluminum hydroxide/magnesium hydroxide/simethicone Suspension 30 milliLiter(s) Oral every 4 hours PRN Dyspepsia  cyclobenzaprine 5 milliGRAM(s) Oral every 8 hours PRN Muscle Spasm  heparin   Injectable 5000 Unit(s) IV Push every 6 hours PRN For aPTT less than 40  heparin   Injectable 2500 Unit(s) IV Push every 6 hours PRN For aPTT between 40 - 57  melatonin 3 milliGRAM(s) Oral at bedtime PRN Insomnia  ondansetron Injectable 4 milliGRAM(s) IV Push every 8 hours PRN Nausea and/or Vomiting  oxyCODONE    IR 5 milliGRAM(s) Oral every 6 hours PRN Severe Pain (7 - 10)      ALLERGIES:  Allergies    No Known Allergies    Intolerances        LABS:                        7.3    9.56  )-----------( 264      ( 09 Sep 2021 04:28 )             19.8     09-09    143  |  111<H>  |  6<L>  ----------------------------<  109<H>  3.4<L>   |  21<L>  |  0.62    Ca    8.9      09 Sep 2021 04:28  Phos  3.6     09-08  Mg     1.60     09-08    TPro  6.0  /  Alb  x   /  TBili  x   /  DBili  x   /  AST  x   /  ALT  x   /  AlkPhos  x   09-09    PT/INR - ( 09 Sep 2021 04:28 )   PT: 20.6 sec;   INR: 1.84 ratio         PTT - ( 09 Sep 2021 04:28 )  PTT:91.7 sec    CAPILLARY BLOOD GLUCOSE          RADIOLOGY & ADDITIONAL TESTS: Reviewed.

## 2021-09-09 NOTE — PROGRESS NOTE ADULT - SUBJECTIVE AND OBJECTIVE BOX
Patient is a 51y old  Female who presents with a chief complaint of back pain/poor po intake (05 Sep 2021 21:41)      9/9/21  LBP ++  with headaches, generalized pain. Afebrile  Fatigue ++    PAST MEDICAL & SURGICAL HISTORY:  Lupus    Sickle cell disease    No significant past surgical history        Review of Systems:   CONSTITUTIONAL: No fever, weight loss, or fatigue  EYES: No eye pain, visual disturbances, or discharge  ENMT:  No difficulty hearing, tinnitus, vertigo; No sinus or throat pain  NECK: No pain or stiffness  BREASTS: No pain, masses, or nipple discharge  RESPIRATORY: No cough, wheezing, chills or hemoptysis; No shortness of breath  CARDIOVASCULAR: No chest pain, palpitations, dizziness, or leg swelling  GASTROINTESTINAL: No abdominal or epigastric pain. No nausea, vomiting, or hematemesis; No diarrhea or constipation. No melena or hematochezia.  GENITOURINARY: No dysuria, frequency, hematuria, or incontinence  NEUROLOGICAL: No headaches, memory loss, loss of strength, numbness, or tremors  SKIN: No itching, burning, rashes, or lesions   LYMPH NODES: No enlarged glands  ENDOCRINE: No heat or cold intolerance; No hair loss  MUSCULOSKELETAL: gen pain  PSYCHIATRIC: No depression, anxiety, mood swings, or difficulty sleeping  HEME/LYMPH: No easy bruising, or bleeding gums  ALLERY AND IMMUNOLOGIC: No hives or eczema    Allergies    No Known Allergies    Intolerances        Social History:     FAMILY HISTORY:  No pertinent family history in first degree relatives        MEDICATIONS  (STANDING):  heparin  Infusion.  Unit(s)/Hr (11 mL/Hr) IV Continuous <Continuous>  lidocaine   4% Patch 1 Patch Transdermal daily  mycophenolate mofetil 1000 milliGRAM(s) Oral two times a day  predniSONE   Tablet 5 milliGRAM(s) Oral daily  sodium chloride 0.9%. 1000 milliLiter(s) (100 mL/Hr) IV Continuous <Continuous>    MEDICATIONS  (PRN):  acetaminophen   Tablet .. 650 milliGRAM(s) Oral every 6 hours PRN Temp greater or equal to 38.5C (101.3F), Mild Pain (1 - 3)  aluminum hydroxide/magnesium hydroxide/simethicone Suspension 30 milliLiter(s) Oral every 4 hours PRN Dyspepsia  heparin   Injectable 5000 Unit(s) IV Push every 6 hours PRN For aPTT less than 40  heparin   Injectable 2500 Unit(s) IV Push every 6 hours PRN For aPTT between 40 - 57  melatonin 3 milliGRAM(s) Oral at bedtime PRN Insomnia  ondansetron Injectable 4 milliGRAM(s) IV Push every 8 hours PRN Nausea and/or Vomiting        CAPILLARY BLOOD GLUCOSE        I&O's Summary    06 Sep 2021 07:01  -  06 Sep 2021 23:45  --------------------------------------------------------  IN: 1266 mL / OUT: 250 mL / NET: 1016 mL        PHYSICAL EXAM:  Vital Signs Last 24 Hrs  T(C): 37.1 (06 Sep 2021 21:59), Max: 37.1 (06 Sep 2021 17:14)  T(F): 98.8 (06 Sep 2021 21:59), Max: 98.8 (06 Sep 2021 21:59)  HR: 93 (06 Sep 2021 21:59) (78 - 93)  BP: 112/61 (06 Sep 2021 21:59) (103/64 - 118/65)  BP(mean): --  RR: 18 (06 Sep 2021 21:59) (18 - 18)  SpO2: 97% (06 Sep 2021 21:59) (96% - 98%)    GENERAL: NAD, well-developed  HEAD:  Atraumatic, Normocephalic  EYES: EOMI, PERRLA, conjunctiva and sclera clear  NECK: Supple, No JVD  CHEST/LUNG: Clear to auscultation bilaterally; No wheeze  HEART: Regular rate and rhythm; No murmurs, rubs, or gallops  ABDOMEN: Soft, Nontender, Nondistended; Bowel sounds present  EXTREMITIES:  2+ Peripheral Pulses, No clubbing, cyanosis, or edema  PSYCH: AAOx3  NEUROLOGY: non-focal  SKIN: No rashes or lesions    LABS:                        7.2    11.00 )-----------( 249      ( 06 Sep 2021 20:22 )             21.5     09-06    140  |  104  |  12  ----------------------------<  100<H>  3.5   |  23  |  0.76    Ca    9.5      06 Sep 2021 07:10    TPro  7.2  /  Alb  4.1  /  TBili  1.6<H>  /  DBili  x   /  AST  16  /  ALT  16  /  AlkPhos  150<H>  09-06    PT/INR - ( 06 Sep 2021 07:10 )   PT: 14.3 sec;   INR: 1.27 ratio         PTT - ( 06 Sep 2021 20:22 )  PTT:151.5 sec      Urinalysis Basic - ( 05 Sep 2021 19:00 )    Color: Yellow / Appearance: Clear / SG: >1.050 / pH: x  Gluc: x / Ketone: Trace  / Bili: Negative / Urobili: 3 mg/dL   Blood: x / Protein: Trace / Nitrite: Negative   Leuk Esterase: Negative / RBC: 1 /HPF / WBC 3 /HPF   Sq Epi: x / Non Sq Epi: 0 /HPF / Bacteria: Negative        RADIOLOGY & ADDITIONAL TESTS:    Imaging Personally Reviewed:    Consultant(s) Notes Reviewed:      Care Discussed with Consultants/Other Providers:

## 2021-09-10 LAB
ALBUMIN SERPL ELPH-MCNC: 3.6 G/DL — SIGNIFICANT CHANGE UP (ref 3.3–5)
ALP SERPL-CCNC: 119 U/L — SIGNIFICANT CHANGE UP (ref 40–120)
ALT FLD-CCNC: 29 U/L — SIGNIFICANT CHANGE UP (ref 4–33)
ANION GAP SERPL CALC-SCNC: 13 MMOL/L — SIGNIFICANT CHANGE UP (ref 7–14)
APTT BLD: 76.3 SEC — HIGH (ref 27–36.3)
AST SERPL-CCNC: 30 U/L — SIGNIFICANT CHANGE UP (ref 4–32)
BASOPHILS # BLD AUTO: 0.02 K/UL — SIGNIFICANT CHANGE UP (ref 0–0.2)
BASOPHILS NFR BLD AUTO: 0.2 % — SIGNIFICANT CHANGE UP (ref 0–2)
BILIRUB DIRECT SERPL-MCNC: 0.3 MG/DL — HIGH (ref 0–0.2)
BILIRUB INDIRECT FLD-MCNC: 0.8 MG/DL — SIGNIFICANT CHANGE UP (ref 0–1)
BILIRUB SERPL-MCNC: 1.1 MG/DL — SIGNIFICANT CHANGE UP (ref 0.2–1.2)
BUN SERPL-MCNC: 5 MG/DL — LOW (ref 7–23)
CALCIUM SERPL-MCNC: 8.7 MG/DL — SIGNIFICANT CHANGE UP (ref 8.4–10.5)
CHLORIDE SERPL-SCNC: 107 MMOL/L — SIGNIFICANT CHANGE UP (ref 98–107)
CO2 SERPL-SCNC: 22 MMOL/L — SIGNIFICANT CHANGE UP (ref 22–31)
CREAT SERPL-MCNC: 0.6 MG/DL — SIGNIFICANT CHANGE UP (ref 0.5–1.3)
EOSINOPHIL # BLD AUTO: 0.31 K/UL — SIGNIFICANT CHANGE UP (ref 0–0.5)
EOSINOPHIL NFR BLD AUTO: 3.4 % — SIGNIFICANT CHANGE UP (ref 0–6)
GLUCOSE SERPL-MCNC: 104 MG/DL — HIGH (ref 70–99)
HCT VFR BLD CALC: 20.7 % — CRITICAL LOW (ref 34.5–45)
HEMOGLOBIN D%: 0 % — SIGNIFICANT CHANGE UP
HEMOGLOBIN INTERPRETATION: SIGNIFICANT CHANGE UP
HGB A MFR BLD: 0 % — LOW
HGB A2 MFR BLD: 3.5 % — SIGNIFICANT CHANGE UP (ref 2.4–3.5)
HGB BLD-MCNC: 7.6 G/DL — LOW (ref 11.5–15.5)
HGB C MFR BLD: 47.9 % — HIGH
HGB F MFR BLD: 1.8 % — HIGH (ref 0–1.5)
HGB S MFR BLD: 46.8 % — HIGH
IANC: 3.86 K/UL — SIGNIFICANT CHANGE UP (ref 1.5–8.5)
IMM GRANULOCYTES NFR BLD AUTO: 1.1 % — SIGNIFICANT CHANGE UP (ref 0–1.5)
INR BLD: 2.43 RATIO — HIGH (ref 0.88–1.16)
LDH SERPL L TO P-CCNC: 418 U/L — HIGH (ref 135–225)
LYMPHOCYTES # BLD AUTO: 4.13 K/UL — HIGH (ref 1–3.3)
LYMPHOCYTES # BLD AUTO: 44.9 % — HIGH (ref 13–44)
MCHC RBC-ENTMCNC: 34.7 PG — HIGH (ref 27–34)
MCHC RBC-ENTMCNC: 36.7 GM/DL — HIGH (ref 32–36)
MCV RBC AUTO: 94.5 FL — SIGNIFICANT CHANGE UP (ref 80–100)
MONOCYTES # BLD AUTO: 0.78 K/UL — SIGNIFICANT CHANGE UP (ref 0–0.9)
MONOCYTES NFR BLD AUTO: 8.5 % — SIGNIFICANT CHANGE UP (ref 2–14)
NEUTROPHILS # BLD AUTO: 3.86 K/UL — SIGNIFICANT CHANGE UP (ref 1.8–7.4)
NEUTROPHILS NFR BLD AUTO: 41.9 % — LOW (ref 43–77)
NRBC # BLD: 14 /100 WBCS — SIGNIFICANT CHANGE UP
NRBC # FLD: 1.2 K/UL — HIGH
PLATELET # BLD AUTO: 259 K/UL — SIGNIFICANT CHANGE UP (ref 150–400)
POTASSIUM SERPL-MCNC: 3.4 MMOL/L — LOW (ref 3.5–5.3)
POTASSIUM SERPL-SCNC: 3.4 MMOL/L — LOW (ref 3.5–5.3)
PROT SERPL-MCNC: 6.1 G/DL — SIGNIFICANT CHANGE UP (ref 6–8.3)
PROTHROM AB SERPL-ACNC: 26.6 SEC — HIGH (ref 10.6–13.6)
RBC # BLD: 2.19 M/UL — LOW (ref 3.8–5.2)
RBC # FLD: 14.1 % — SIGNIFICANT CHANGE UP (ref 10.3–14.5)
RETICS #: 163.4 K/UL — HIGH (ref 25–125)
RETICS/RBC NFR: 7.5 % — HIGH (ref 0.5–2.5)
SODIUM SERPL-SCNC: 142 MMOL/L — SIGNIFICANT CHANGE UP (ref 135–145)
TROPONIN T, HIGH SENSITIVITY RESULT: <6 NG/L — SIGNIFICANT CHANGE UP
WBC # BLD: 8.87 K/UL — SIGNIFICANT CHANGE UP (ref 3.8–10.5)
WBC # FLD AUTO: 8.87 K/UL — SIGNIFICANT CHANGE UP (ref 3.8–10.5)

## 2021-09-10 PROCEDURE — 71045 X-RAY EXAM CHEST 1 VIEW: CPT | Mod: 26

## 2021-09-10 PROCEDURE — 93010 ELECTROCARDIOGRAM REPORT: CPT

## 2021-09-10 RX ORDER — POTASSIUM CHLORIDE 20 MEQ
40 PACKET (EA) ORAL ONCE
Refills: 0 | Status: COMPLETED | OUTPATIENT
Start: 2021-09-10 | End: 2021-09-10

## 2021-09-10 RX ORDER — ACETAMINOPHEN 500 MG
650 TABLET ORAL EVERY 6 HOURS
Refills: 0 | Status: DISCONTINUED | OUTPATIENT
Start: 2021-09-10 | End: 2021-09-16

## 2021-09-10 RX ORDER — OXYCODONE HYDROCHLORIDE 5 MG/1
5 TABLET ORAL EVERY 6 HOURS
Refills: 0 | Status: DISCONTINUED | OUTPATIENT
Start: 2021-09-10 | End: 2021-09-16

## 2021-09-10 RX ORDER — SODIUM CHLORIDE 9 MG/ML
1000 INJECTION, SOLUTION INTRAVENOUS
Refills: 0 | Status: DISCONTINUED | OUTPATIENT
Start: 2021-09-10 | End: 2021-09-12

## 2021-09-10 RX ORDER — WARFARIN SODIUM 2.5 MG/1
4 TABLET ORAL ONCE
Refills: 0 | Status: COMPLETED | OUTPATIENT
Start: 2021-09-10 | End: 2021-09-10

## 2021-09-10 RX ORDER — POTASSIUM CHLORIDE 20 MEQ
40 PACKET (EA) ORAL ONCE
Refills: 0 | Status: DISCONTINUED | OUTPATIENT
Start: 2021-09-10 | End: 2021-09-10

## 2021-09-10 RX ORDER — WARFARIN SODIUM 2.5 MG/1
5 TABLET ORAL ONCE
Refills: 0 | Status: DISCONTINUED | OUTPATIENT
Start: 2021-09-10 | End: 2021-09-10

## 2021-09-10 RX ADMIN — Medication 40 MILLIEQUIVALENT(S): at 13:07

## 2021-09-10 RX ADMIN — GABAPENTIN 200 MILLIGRAM(S): 400 CAPSULE ORAL at 17:14

## 2021-09-10 RX ADMIN — Medication 1 MILLIGRAM(S): at 12:37

## 2021-09-10 RX ADMIN — OXYCODONE HYDROCHLORIDE 5 MILLIGRAM(S): 5 TABLET ORAL at 03:31

## 2021-09-10 RX ADMIN — GABAPENTIN 200 MILLIGRAM(S): 400 CAPSULE ORAL at 05:45

## 2021-09-10 RX ADMIN — WARFARIN SODIUM 4 MILLIGRAM(S): 2.5 TABLET ORAL at 17:14

## 2021-09-10 RX ADMIN — Medication 650 MILLIGRAM(S): at 11:00

## 2021-09-10 RX ADMIN — MYCOPHENOLATE MOFETIL 1000 MILLIGRAM(S): 250 CAPSULE ORAL at 05:45

## 2021-09-10 RX ADMIN — SODIUM CHLORIDE 100 MILLILITER(S): 9 INJECTION INTRAMUSCULAR; INTRAVENOUS; SUBCUTANEOUS at 05:46

## 2021-09-10 RX ADMIN — OXYCODONE HYDROCHLORIDE 5 MILLIGRAM(S): 5 TABLET ORAL at 16:30

## 2021-09-10 RX ADMIN — LIDOCAINE 1 PATCH: 4 CREAM TOPICAL at 00:10

## 2021-09-10 RX ADMIN — Medication 5 MILLIGRAM(S): at 05:45

## 2021-09-10 RX ADMIN — SODIUM CHLORIDE 75 MILLILITER(S): 9 INJECTION, SOLUTION INTRAVENOUS at 10:54

## 2021-09-10 RX ADMIN — MYCOPHENOLATE MOFETIL 1000 MILLIGRAM(S): 250 CAPSULE ORAL at 17:14

## 2021-09-10 RX ADMIN — PANTOPRAZOLE SODIUM 40 MILLIGRAM(S): 20 TABLET, DELAYED RELEASE ORAL at 05:45

## 2021-09-10 RX ADMIN — OXYCODONE HYDROCHLORIDE 5 MILLIGRAM(S): 5 TABLET ORAL at 15:59

## 2021-09-10 RX ADMIN — Medication 650 MILLIGRAM(S): at 10:26

## 2021-09-10 RX ADMIN — OXYCODONE HYDROCHLORIDE 5 MILLIGRAM(S): 5 TABLET ORAL at 04:20

## 2021-09-10 RX ADMIN — HEPARIN SODIUM 600 UNIT(S)/HR: 5000 INJECTION INTRAVENOUS; SUBCUTANEOUS at 07:14

## 2021-09-10 NOTE — PROVIDER CONTACT NOTE (CRITICAL VALUE NOTIFICATION) - ACTION/TREATMENT ORDERED:
Notified MD , ordered to repeat labs, will continue to monitor the patient
JAMES Chaparro was made aware and will follow up.
As per provider Amina RAMIREZ, heparin nomogram used and to stop infusion for 1 hr and then decrease dose by  2 ml/hr. Order initiated.  Repeat APTT after 6 hrs.
Solis Floyd was made aware and will follow up.

## 2021-09-10 NOTE — CONSULT NOTE ADULT - ASSESSMENT
51y old  Female who presents with a chief complaint of back pain/poor po intake (09 Sep 2021 16:58), had gall bladder surgery in July and since then, she has been having pain, oxycodone helps, decreasing hgb while on a/c, will recommend:    - continue Rx as per medicine, rheumatology  - consider pain consult  - on IV heparin for APS and h/o recurrent clots - monitor for any bleeding, hgb, plts  - obtain ferritin, iron studies, B12, folate, stool for occult blood  - obtain SPEP, SIFx and UPEP  - transfuse sickle free unit of blood for symptomatic anemia  - incentive spirometry  - IVF to be 1/2 NS   - folic acid 1 mg daily  - all other supportive Rx    for questions, please call [phone] 51y old  Female who presents with a chief complaint of back pain/poor po intake (09 Sep 2021 16:58), had gall bladder surgery in July and since then, she has been having pain, oxycodone helps, decreasing hgb while on a/c, will recommend:    - continue Rx as per medicine, rheumatology  - consider pain consult  - no current evidence of AIHA  - on IV heparin for APS and h/o recurrent clots - monitor for any bleeding, hgb, plts  - obtain ferritin, iron studies, B12, folate, stool for occult blood  - obtain SPEP, SIFx and UPEP  - transfuse sickle free unit of blood for symptomatic anemia  - incentive spirometry  - IVF to be 1/2 NS   - folic acid 1 mg daily  - all other supportive Rx    for questions, please call [phone]

## 2021-09-10 NOTE — PROVIDER CONTACT NOTE (CRITICAL VALUE NOTIFICATION) - BACKGROUND
on Heparin drip hx of PE , Lupus
patient admitted with dx of Lower back pain.
Patient admitted with dx of lower back pain
Patient admitted with dx of lower back pain, pt has hx of lupus, sickle cell disease, history of PE on Heparin gtt
patient admitted for back pain and decreased po intake

## 2021-09-10 NOTE — PROGRESS NOTE ADULT - SUBJECTIVE AND OBJECTIVE BOX
Patient is a 51y old  Female who presents with a chief complaint of back pain/poor po intake (05 Sep 2021 21:41)      9/10/21    with headaches, generalized pain. Afebrile  Fatigue ++  PRBC transfusion in progress.    PAST MEDICAL & SURGICAL HISTORY:  Lupus    Sickle cell disease    No significant past surgical history        Review of Systems:   CONSTITUTIONAL: No fever, weight loss, or fatigue  EYES: No eye pain, visual disturbances, or discharge  ENMT:  No difficulty hearing, tinnitus, vertigo; No sinus or throat pain  NECK: No pain or stiffness  BREASTS: No pain, masses, or nipple discharge  RESPIRATORY: No cough, wheezing, chills or hemoptysis; No shortness of breath  CARDIOVASCULAR: No chest pain, palpitations, dizziness, or leg swelling  GASTROINTESTINAL: No abdominal or epigastric pain. No nausea, vomiting, or hematemesis; No diarrhea or constipation. No melena or hematochezia.  GENITOURINARY: No dysuria, frequency, hematuria, or incontinence  NEUROLOGICAL: No headaches, memory loss, loss of strength, numbness, or tremors  SKIN: No itching, burning, rashes, or lesions   LYMPH NODES: No enlarged glands  ENDOCRINE: No heat or cold intolerance; No hair loss  MUSCULOSKELETAL: gen pain  PSYCHIATRIC: No depression, anxiety, mood swings, or difficulty sleeping  HEME/LYMPH: No easy bruising, or bleeding gums  ALLERY AND IMMUNOLOGIC: No hives or eczema    Allergies    No Known Allergies    Intolerances        Social History:     FAMILY HISTORY:  No pertinent family history in first degree relatives        MEDICATIONS  (STANDING):  heparin  Infusion.  Unit(s)/Hr (11 mL/Hr) IV Continuous <Continuous>  lidocaine   4% Patch 1 Patch Transdermal daily  mycophenolate mofetil 1000 milliGRAM(s) Oral two times a day  predniSONE   Tablet 5 milliGRAM(s) Oral daily  sodium chloride 0.9%. 1000 milliLiter(s) (100 mL/Hr) IV Continuous <Continuous>    MEDICATIONS  (PRN):  acetaminophen   Tablet .. 650 milliGRAM(s) Oral every 6 hours PRN Temp greater or equal to 38.5C (101.3F), Mild Pain (1 - 3)  aluminum hydroxide/magnesium hydroxide/simethicone Suspension 30 milliLiter(s) Oral every 4 hours PRN Dyspepsia  heparin   Injectable 5000 Unit(s) IV Push every 6 hours PRN For aPTT less than 40  heparin   Injectable 2500 Unit(s) IV Push every 6 hours PRN For aPTT between 40 - 57  melatonin 3 milliGRAM(s) Oral at bedtime PRN Insomnia  ondansetron Injectable 4 milliGRAM(s) IV Push every 8 hours PRN Nausea and/or Vomiting        CAPILLARY BLOOD GLUCOSE        I&O's Summary    06 Sep 2021 07:01  -  06 Sep 2021 23:45  --------------------------------------------------------  IN: 1266 mL / OUT: 250 mL / NET: 1016 mL        PHYSICAL EXAM:  Vital Signs Last 24 Hrs  T(C): 37.1 (06 Sep 2021 21:59), Max: 37.1 (06 Sep 2021 17:14)  T(F): 98.8 (06 Sep 2021 21:59), Max: 98.8 (06 Sep 2021 21:59)  HR: 93 (06 Sep 2021 21:59) (78 - 93)  BP: 112/61 (06 Sep 2021 21:59) (103/64 - 118/65)  BP(mean): --  RR: 18 (06 Sep 2021 21:59) (18 - 18)  SpO2: 97% (06 Sep 2021 21:59) (96% - 98%)    GENERAL: NAD, well-developed  HEAD:  Atraumatic, Normocephalic  EYES: EOMI, PERRLA, conjunctiva and sclera clear  NECK: Supple, No JVD  CHEST/LUNG: Clear to auscultation bilaterally; No wheeze  HEART: Regular rate and rhythm; No murmurs, rubs, or gallops  ABDOMEN: Soft, Nontender, Nondistended; Bowel sounds present  EXTREMITIES:  2+ Peripheral Pulses, No clubbing, cyanosis, or edema  PSYCH: AAOx3  NEUROLOGY: non-focal  SKIN: No rashes or lesions    LABS:                        7.2    11.00 )-----------( 249      ( 06 Sep 2021 20:22 )             21.5     09-06    140  |  104  |  12  ----------------------------<  100<H>  3.5   |  23  |  0.76    Ca    9.5      06 Sep 2021 07:10    TPro  7.2  /  Alb  4.1  /  TBili  1.6<H>  /  DBili  x   /  AST  16  /  ALT  16  /  AlkPhos  150<H>  09-06    PT/INR - ( 06 Sep 2021 07:10 )   PT: 14.3 sec;   INR: 1.27 ratio         PTT - ( 06 Sep 2021 20:22 )  PTT:151.5 sec      Urinalysis Basic - ( 05 Sep 2021 19:00 )    Color: Yellow / Appearance: Clear / SG: >1.050 / pH: x  Gluc: x / Ketone: Trace  / Bili: Negative / Urobili: 3 mg/dL   Blood: x / Protein: Trace / Nitrite: Negative   Leuk Esterase: Negative / RBC: 1 /HPF / WBC 3 /HPF   Sq Epi: x / Non Sq Epi: 0 /HPF / Bacteria: Negative        RADIOLOGY & ADDITIONAL TESTS:    Imaging Personally Reviewed:    Consultant(s) Notes Reviewed:      Care Discussed with Consultants/Other Providers:

## 2021-09-10 NOTE — CONSULT NOTE ADULT - SUBJECTIVE AND OBJECTIVE BOX
Patient is a 51y old  Female who presents with a chief complaint of back pain/poor po intake (09 Sep 2021 16:58), had gall bladder surgery in July and since then, she has been having pain, oxycodone helps, no cough, no SOB, no fevers or chills and other than fatigue and some lightheadedness, a detailed ROS unremarkable. Has not had sickle cell crisis in the past. Has lupus and gets aches and pain from that periodically.     Last transfusion was > 20 years ago.       PAST MEDICAL & SURGICAL HISTORY:  Lupus    SC disease    No significant past surgical history        Meds:  aluminum hydroxide/magnesium hydroxide/simethicone Suspension 30 milliLiter(s) Oral every 4 hours PRN  cyclobenzaprine 5 milliGRAM(s) Oral every 8 hours PRN  folic acid 1 milliGRAM(s) Oral daily  gabapentin 200 milliGRAM(s) Oral every 12 hours  heparin   Injectable 5000 Unit(s) IV Push every 6 hours PRN  heparin   Injectable 2500 Unit(s) IV Push every 6 hours PRN  heparin  Infusion.  Unit(s)/Hr IV Continuous <Continuous>  lidocaine   4% Patch 1 Patch Transdermal daily  melatonin 3 milliGRAM(s) Oral at bedtime PRN  mycophenolate mofetil 1000 milliGRAM(s) Oral two times a day  ondansetron Injectable 4 milliGRAM(s) IV Push every 8 hours PRN  oxyCODONE    IR 5 milliGRAM(s) Oral every 6 hours PRN  pantoprazole    Tablet 40 milliGRAM(s) Oral before breakfast  potassium chloride   Powder 40 milliEquivalent(s) Oral once  predniSONE   Tablet 5 milliGRAM(s) Oral daily  sodium chloride 0.9%. 1000 milliLiter(s) IV Continuous <Continuous>      No Known Allergies      FAMILY HISTORY:  No pertinent family history in first degree relatives        Vital Signs Last 24 Hrs  T(C): 36.7 (10 Sep 2021 05:40), Max: 36.9 (09 Sep 2021 17:48)  T(F): 98.1 (10 Sep 2021 05:40), Max: 98.4 (09 Sep 2021 17:48)  HR: 81 (10 Sep 2021 05:40) (81 - 84)  BP: 128/78 (10 Sep 2021 05:40) (105/58 - 128/78)  BP(mean): --  RR: 17 (10 Sep 2021 05:40) (17 - 19)  SpO2: 98% (10 Sep 2021 05:40) (95% - 98%)                          7.6    8.87  )-----------( 259      ( 10 Sep 2021 06:08 )             20.7       09-10    142  |  107  |  5<L>  ----------------------------<  104<H>  3.4<L>   |  22  |  0.60    Ca    8.7      10 Sep 2021 06:08    TPro  6.0  /  Alb  x   /  TBili  x   /  DBili  x   /  AST  x   /  ALT  x   /  AlkPhos  x   09-09      PT/INR - ( 10 Sep 2021 06:08 )   PT: 26.6 sec;   INR: 2.43 ratio         PTT - ( 10 Sep 2021 06:08 )  PTT:76.3 sec      Hapto < 20, LD high      CT a/p: IMPRESSION:  No acute findings within the abdomen or pelvis.        --- End of Report ---            NESTOR MATHEW MD; Resident Radiology  This document has been electronically signed.  LEONARD GUTIERREZ MD; Attending Radiologist  This document has been electronically signed. Sep  5 2021  4:22PM      CT L-spine: IMPRESSION:    Unremarkable CT of the lumbar spine. No fractures, lytic or blastic lesions.    --- End of Report ---            NESTOR MATHEW MD; Resident Radiology  This document has been electronically signed.  MONI SÁNCHEZ MD; Attending Radiologist  This document has been electronically signed. Sep  5 2021  5:31PM      MRI L-spine:   IMPRESSION: Mild degenerative changes as described above.    --- End of Report ---              MARGIE ONEILL MD; Attending Radiologist  This document has been electronically signed. Sep  8 2021  8:20AM                                 Patient is a 51y old  Female who presents with a chief complaint of back pain/poor po intake (09 Sep 2021 16:58), had gall bladder surgery in July and since then, she has been having pain, oxycodone helps, no cough, no SOB, no fevers or chills and other than fatigue and some lightheadedness, a detailed ROS unremarkable. Has not had sickle cell crisis in the past. Has lupus and gets aches and pain from that periodically.     Last transfusion was > 20 years ago.       PAST MEDICAL & SURGICAL HISTORY:  Lupus    SC disease    No significant past surgical history        Meds:  aluminum hydroxide/magnesium hydroxide/simethicone Suspension 30 milliLiter(s) Oral every 4 hours PRN  cyclobenzaprine 5 milliGRAM(s) Oral every 8 hours PRN  folic acid 1 milliGRAM(s) Oral daily  gabapentin 200 milliGRAM(s) Oral every 12 hours  heparin   Injectable 5000 Unit(s) IV Push every 6 hours PRN  heparin   Injectable 2500 Unit(s) IV Push every 6 hours PRN  heparin  Infusion.  Unit(s)/Hr IV Continuous <Continuous>  lidocaine   4% Patch 1 Patch Transdermal daily  melatonin 3 milliGRAM(s) Oral at bedtime PRN  mycophenolate mofetil 1000 milliGRAM(s) Oral two times a day  ondansetron Injectable 4 milliGRAM(s) IV Push every 8 hours PRN  oxyCODONE    IR 5 milliGRAM(s) Oral every 6 hours PRN  pantoprazole    Tablet 40 milliGRAM(s) Oral before breakfast  potassium chloride   Powder 40 milliEquivalent(s) Oral once  predniSONE   Tablet 5 milliGRAM(s) Oral daily  sodium chloride 0.9%. 1000 milliLiter(s) IV Continuous <Continuous>      No Known Allergies      FAMILY HISTORY:  No pertinent family history in first degree relatives        Vital Signs Last 24 Hrs  T(C): 36.7 (10 Sep 2021 05:40), Max: 36.9 (09 Sep 2021 17:48)  T(F): 98.1 (10 Sep 2021 05:40), Max: 98.4 (09 Sep 2021 17:48)  HR: 81 (10 Sep 2021 05:40) (81 - 84)  BP: 128/78 (10 Sep 2021 05:40) (105/58 - 128/78)  BP(mean): --  RR: 17 (10 Sep 2021 05:40) (17 - 19)  SpO2: 98% (10 Sep 2021 05:40) (95% - 98%)                          7.6    8.87  )-----------( 259      ( 10 Sep 2021 06:08 )             20.7       09-10    142  |  107  |  5<L>  ----------------------------<  104<H>  3.4<L>   |  22  |  0.60    Ca    8.7      10 Sep 2021 06:08    TPro  6.0  /  Alb  x   /  TBili  x   /  DBili  x   /  AST  x   /  ALT  x   /  AlkPhos  x   09-09      PT/INR - ( 10 Sep 2021 06:08 )   PT: 26.6 sec;   INR: 2.43 ratio         PTT - ( 10 Sep 2021 06:08 )  PTT:76.3 sec      Hapto < 20, LD high      CT a/p: IMPRESSION:  No acute findings within the abdomen or pelvis.        --- End of Report ---            NESTOR MATHEW MD; Resident Radiology  This document has been electronically signed.  LEONARD GUTIERREZ MD; Attending Radiologist  This document has been electronically signed. Sep  5 2021  4:22PM      CT L-spine: IMPRESSION:    Unremarkable CT of the lumbar spine. No fractures, lytic or blastic lesions.    --- End of Report ---            NESTOR MATHEW MD; Resident Radiology  This document has been electronically signed.  MONI SÁNCHEZ MD; Attending Radiologist  This document has been electronically signed. Sep  5 2021  5:31PM      MRI L-spine:   IMPRESSION: Mild degenerative changes as described above.    --- End of Report ---              MARGIE ONEILL MD; Attending Radiologist  This document has been electronically signed. Sep  8 2021  8:20AM        Hgb electrophoresis c/w SC disease.    Blood Smear from 9.10.21: RCBCs show anisocytosis, some poikilocytosis, significant polychromasia, several target cells, several nucleated red cells, no significant spherocytosis. WBCs are normal in number, no left shift, no bilobed or hypersegmented PMNs and no immature cells appreciated. PLts adequate.      Direct Coomb's negative.

## 2021-09-10 NOTE — PROVIDER CONTACT NOTE (CRITICAL VALUE NOTIFICATION) - SITUATION
Patient hematocrit is 20.7
H+H 6.8/18.7
Potassium level 2.2 and Calcium 5.7
APTT level 151.5. patient is on heparin drip
pt A& O x 4, vs stable, resting in bed , denies pain

## 2021-09-10 NOTE — PROVIDER CONTACT NOTE (CRITICAL VALUE NOTIFICATION) - ASSESSMENT
Triage note: Mother stating that patient woke this morning and had dried blood on her clothes and pillowcase. Mother stating patient ate donut this morning two bites and is not nursing like she kendall jiménez. Patient has been on amoxicillin for 4 days due to cough and fever.
Patient is AxOx4.  VS stable. No s/s of distress or discomfort noted. Patient is on heparin drip at 11m/lhr.
Patient is AxOx4. VS stable. No s/s of distress noted. Patient c/o soreness in body.
Vitals stable
pt A& O x 4, vitals stable , oob self, voiding , hematocrit 19.8
Patient is AxOx4. VS stable. No s/s of distress noted. Patient denies dizziness or shortness of breath at this time

## 2021-09-11 LAB
ANION GAP SERPL CALC-SCNC: 15 MMOL/L — HIGH (ref 7–14)
APTT BLD: 37.7 SEC — HIGH (ref 27–36.3)
BASOPHILS # BLD AUTO: 0.03 K/UL — SIGNIFICANT CHANGE UP (ref 0–0.2)
BASOPHILS NFR BLD AUTO: 0.4 % — SIGNIFICANT CHANGE UP (ref 0–2)
BLD GP AB SCN SERPL QL: NEGATIVE — SIGNIFICANT CHANGE UP
BUN SERPL-MCNC: 5 MG/DL — LOW (ref 7–23)
CALCIUM SERPL-MCNC: 9.5 MG/DL — SIGNIFICANT CHANGE UP (ref 8.4–10.5)
CHLORIDE SERPL-SCNC: 102 MMOL/L — SIGNIFICANT CHANGE UP (ref 98–107)
CO2 SERPL-SCNC: 24 MMOL/L — SIGNIFICANT CHANGE UP (ref 22–31)
CREAT SERPL-MCNC: 0.55 MG/DL — SIGNIFICANT CHANGE UP (ref 0.5–1.3)
EOSINOPHIL # BLD AUTO: 0.26 K/UL — SIGNIFICANT CHANGE UP (ref 0–0.5)
EOSINOPHIL NFR BLD AUTO: 3.6 % — SIGNIFICANT CHANGE UP (ref 0–6)
FERRITIN SERPL-MCNC: 1287 NG/ML — HIGH (ref 15–150)
FOLATE SERPL-MCNC: 18.9 NG/ML — HIGH (ref 3.1–17.5)
GLUCOSE SERPL-MCNC: 107 MG/DL — HIGH (ref 70–99)
HCT VFR BLD CALC: 28 % — LOW (ref 34.5–45)
HGB BLD-MCNC: 9.2 G/DL — LOW (ref 11.5–15.5)
IANC: 3.11 K/UL — SIGNIFICANT CHANGE UP (ref 1.5–8.5)
IMM GRANULOCYTES NFR BLD AUTO: 0.4 % — SIGNIFICANT CHANGE UP (ref 0–1.5)
INR BLD: 2.13 RATIO — HIGH (ref 0.88–1.16)
IRON SATN MFR SERPL: 184 UG/DL — HIGH (ref 30–160)
IRON SATN MFR SERPL: 74 % — HIGH (ref 14–50)
LYMPHOCYTES # BLD AUTO: 2.99 K/UL — SIGNIFICANT CHANGE UP (ref 1–3.3)
LYMPHOCYTES # BLD AUTO: 41.4 % — SIGNIFICANT CHANGE UP (ref 13–44)
MAGNESIUM SERPL-MCNC: 1.8 MG/DL — SIGNIFICANT CHANGE UP (ref 1.6–2.6)
MCHC RBC-ENTMCNC: 32.8 GM/DL — SIGNIFICANT CHANGE UP (ref 32–36)
MCHC RBC-ENTMCNC: 34.7 PG — HIGH (ref 27–34)
MCV RBC AUTO: 93.6 FL — SIGNIFICANT CHANGE UP (ref 80–100)
MONOCYTES # BLD AUTO: 0.8 K/UL — SIGNIFICANT CHANGE UP (ref 0–0.9)
MONOCYTES NFR BLD AUTO: 11.1 % — SIGNIFICANT CHANGE UP (ref 2–14)
NEUTROPHILS # BLD AUTO: 3.11 K/UL — SIGNIFICANT CHANGE UP (ref 1.8–7.4)
NEUTROPHILS NFR BLD AUTO: 43.1 % — SIGNIFICANT CHANGE UP (ref 43–77)
NRBC # BLD: 20 /100 WBCS — SIGNIFICANT CHANGE UP
NRBC # FLD: 1.42 K/UL — HIGH
PHOSPHATE SERPL-MCNC: 3.5 MG/DL — SIGNIFICANT CHANGE UP (ref 2.5–4.5)
PLATELET # BLD AUTO: 258 K/UL — SIGNIFICANT CHANGE UP (ref 150–400)
POTASSIUM SERPL-MCNC: 3.7 MMOL/L — SIGNIFICANT CHANGE UP (ref 3.5–5.3)
POTASSIUM SERPL-SCNC: 3.7 MMOL/L — SIGNIFICANT CHANGE UP (ref 3.5–5.3)
PROT ?TM UR-MCNC: 5 MG/DL — SIGNIFICANT CHANGE UP
PROT SERPL-MCNC: 6.6 G/DL — SIGNIFICANT CHANGE UP (ref 6–8.3)
PROTHROM AB SERPL-ACNC: 23.6 SEC — HIGH (ref 10.6–13.6)
RBC # BLD: 2.65 M/UL — LOW (ref 3.8–5.2)
RBC # FLD: 14.1 % — SIGNIFICANT CHANGE UP (ref 10.3–14.5)
RH IG SCN BLD-IMP: NEGATIVE — SIGNIFICANT CHANGE UP
SODIUM SERPL-SCNC: 141 MMOL/L — SIGNIFICANT CHANGE UP (ref 135–145)
TIBC SERPL-MCNC: 247 UG/DL — SIGNIFICANT CHANGE UP (ref 220–430)
UIBC SERPL-MCNC: 63 UG/DL — LOW (ref 110–370)
VIT B12 SERPL-MCNC: 1068 PG/ML — HIGH (ref 200–900)
WBC # BLD: 7.22 K/UL — SIGNIFICANT CHANGE UP (ref 3.8–10.5)
WBC # FLD AUTO: 7.22 K/UL — SIGNIFICANT CHANGE UP (ref 3.8–10.5)

## 2021-09-11 PROCEDURE — 86334 IMMUNOFIX E-PHORESIS SERUM: CPT | Mod: 26

## 2021-09-11 RX ORDER — WARFARIN SODIUM 2.5 MG/1
5 TABLET ORAL ONCE
Refills: 0 | Status: COMPLETED | OUTPATIENT
Start: 2021-09-11 | End: 2021-09-11

## 2021-09-11 RX ADMIN — Medication 650 MILLIGRAM(S): at 01:15

## 2021-09-11 RX ADMIN — Medication 650 MILLIGRAM(S): at 00:21

## 2021-09-11 RX ADMIN — Medication 1 MILLIGRAM(S): at 12:15

## 2021-09-11 RX ADMIN — MYCOPHENOLATE MOFETIL 1000 MILLIGRAM(S): 250 CAPSULE ORAL at 17:25

## 2021-09-11 RX ADMIN — MYCOPHENOLATE MOFETIL 1000 MILLIGRAM(S): 250 CAPSULE ORAL at 05:03

## 2021-09-11 RX ADMIN — GABAPENTIN 200 MILLIGRAM(S): 400 CAPSULE ORAL at 05:03

## 2021-09-11 RX ADMIN — OXYCODONE HYDROCHLORIDE 5 MILLIGRAM(S): 5 TABLET ORAL at 13:40

## 2021-09-11 RX ADMIN — Medication 5 MILLIGRAM(S): at 06:34

## 2021-09-11 RX ADMIN — WARFARIN SODIUM 5 MILLIGRAM(S): 2.5 TABLET ORAL at 17:28

## 2021-09-11 RX ADMIN — GABAPENTIN 200 MILLIGRAM(S): 400 CAPSULE ORAL at 17:25

## 2021-09-11 RX ADMIN — OXYCODONE HYDROCHLORIDE 5 MILLIGRAM(S): 5 TABLET ORAL at 05:01

## 2021-09-11 RX ADMIN — PANTOPRAZOLE SODIUM 40 MILLIGRAM(S): 20 TABLET, DELAYED RELEASE ORAL at 06:34

## 2021-09-11 RX ADMIN — OXYCODONE HYDROCHLORIDE 5 MILLIGRAM(S): 5 TABLET ORAL at 04:15

## 2021-09-11 RX ADMIN — OXYCODONE HYDROCHLORIDE 5 MILLIGRAM(S): 5 TABLET ORAL at 14:30

## 2021-09-11 RX ADMIN — SODIUM CHLORIDE 75 MILLILITER(S): 9 INJECTION, SOLUTION INTRAVENOUS at 08:29

## 2021-09-12 ENCOUNTER — TRANSCRIPTION ENCOUNTER (OUTPATIENT)
Age: 51
End: 2021-09-12

## 2021-09-12 LAB
ANION GAP SERPL CALC-SCNC: 14 MMOL/L — SIGNIFICANT CHANGE UP (ref 7–14)
APTT BLD: 35.1 SEC — SIGNIFICANT CHANGE UP (ref 27–36.3)
BASOPHILS # BLD AUTO: 0.04 K/UL — SIGNIFICANT CHANGE UP (ref 0–0.2)
BASOPHILS NFR BLD AUTO: 0.5 % — SIGNIFICANT CHANGE UP (ref 0–2)
BUN SERPL-MCNC: 8 MG/DL — SIGNIFICANT CHANGE UP (ref 7–23)
CALCIUM SERPL-MCNC: 9.5 MG/DL — SIGNIFICANT CHANGE UP (ref 8.4–10.5)
CHLORIDE SERPL-SCNC: 101 MMOL/L — SIGNIFICANT CHANGE UP (ref 98–107)
CO2 SERPL-SCNC: 24 MMOL/L — SIGNIFICANT CHANGE UP (ref 22–31)
CREAT SERPL-MCNC: 0.6 MG/DL — SIGNIFICANT CHANGE UP (ref 0.5–1.3)
EOSINOPHIL # BLD AUTO: 0.23 K/UL — SIGNIFICANT CHANGE UP (ref 0–0.5)
EOSINOPHIL NFR BLD AUTO: 2.9 % — SIGNIFICANT CHANGE UP (ref 0–6)
GLUCOSE SERPL-MCNC: 98 MG/DL — SIGNIFICANT CHANGE UP (ref 70–99)
HCT VFR BLD CALC: 27.2 % — LOW (ref 34.5–45)
HGB BLD-MCNC: 10.1 G/DL — LOW (ref 11.5–15.5)
IANC: 3.75 K/UL — SIGNIFICANT CHANGE UP (ref 1.5–8.5)
IMM GRANULOCYTES NFR BLD AUTO: 0.4 % — SIGNIFICANT CHANGE UP (ref 0–1.5)
INR BLD: 1.93 RATIO — HIGH (ref 0.88–1.16)
LYMPHOCYTES # BLD AUTO: 3.19 K/UL — SIGNIFICANT CHANGE UP (ref 1–3.3)
LYMPHOCYTES # BLD AUTO: 39.9 % — SIGNIFICANT CHANGE UP (ref 13–44)
MAGNESIUM SERPL-MCNC: 2 MG/DL — SIGNIFICANT CHANGE UP (ref 1.6–2.6)
MCHC RBC-ENTMCNC: 34.7 PG — HIGH (ref 27–34)
MCHC RBC-ENTMCNC: 37.1 GM/DL — HIGH (ref 32–36)
MCV RBC AUTO: 93.5 FL — SIGNIFICANT CHANGE UP (ref 80–100)
MONOCYTES # BLD AUTO: 0.76 K/UL — SIGNIFICANT CHANGE UP (ref 0–0.9)
MONOCYTES NFR BLD AUTO: 9.5 % — SIGNIFICANT CHANGE UP (ref 2–14)
NEUTROPHILS # BLD AUTO: 3.75 K/UL — SIGNIFICANT CHANGE UP (ref 1.8–7.4)
NEUTROPHILS NFR BLD AUTO: 46.8 % — SIGNIFICANT CHANGE UP (ref 43–77)
NRBC # BLD: 21 /100 WBCS — SIGNIFICANT CHANGE UP
NRBC # FLD: 1.67 K/UL — HIGH
PHOSPHATE SERPL-MCNC: 3.6 MG/DL — SIGNIFICANT CHANGE UP (ref 2.5–4.5)
PLATELET # BLD AUTO: 286 K/UL — SIGNIFICANT CHANGE UP (ref 150–400)
POTASSIUM SERPL-MCNC: 3.7 MMOL/L — SIGNIFICANT CHANGE UP (ref 3.5–5.3)
POTASSIUM SERPL-SCNC: 3.7 MMOL/L — SIGNIFICANT CHANGE UP (ref 3.5–5.3)
PROTHROM AB SERPL-ACNC: 21.3 SEC — HIGH (ref 10.6–13.6)
RBC # BLD: 2.91 M/UL — LOW (ref 3.8–5.2)
RBC # FLD: 14.2 % — SIGNIFICANT CHANGE UP (ref 10.3–14.5)
SODIUM SERPL-SCNC: 139 MMOL/L — SIGNIFICANT CHANGE UP (ref 135–145)
WBC # BLD: 8 K/UL — SIGNIFICANT CHANGE UP (ref 3.8–10.5)
WBC # FLD AUTO: 8 K/UL — SIGNIFICANT CHANGE UP (ref 3.8–10.5)

## 2021-09-12 RX ORDER — WARFARIN SODIUM 2.5 MG/1
6 TABLET ORAL ONCE
Refills: 0 | Status: COMPLETED | OUTPATIENT
Start: 2021-09-12 | End: 2021-09-12

## 2021-09-12 RX ORDER — SODIUM CHLORIDE 9 MG/ML
1000 INJECTION INTRAMUSCULAR; INTRAVENOUS; SUBCUTANEOUS
Refills: 0 | Status: DISCONTINUED | OUTPATIENT
Start: 2021-09-12 | End: 2021-09-14

## 2021-09-12 RX ADMIN — OXYCODONE HYDROCHLORIDE 5 MILLIGRAM(S): 5 TABLET ORAL at 05:09

## 2021-09-12 RX ADMIN — SODIUM CHLORIDE 75 MILLILITER(S): 9 INJECTION, SOLUTION INTRAVENOUS at 07:38

## 2021-09-12 RX ADMIN — Medication 1 MILLIGRAM(S): at 12:06

## 2021-09-12 RX ADMIN — OXYCODONE HYDROCHLORIDE 5 MILLIGRAM(S): 5 TABLET ORAL at 21:27

## 2021-09-12 RX ADMIN — WARFARIN SODIUM 6 MILLIGRAM(S): 2.5 TABLET ORAL at 17:47

## 2021-09-12 RX ADMIN — Medication 5 MILLIGRAM(S): at 07:38

## 2021-09-12 RX ADMIN — CYCLOBENZAPRINE HYDROCHLORIDE 5 MILLIGRAM(S): 10 TABLET, FILM COATED ORAL at 05:09

## 2021-09-12 RX ADMIN — CYCLOBENZAPRINE HYDROCHLORIDE 5 MILLIGRAM(S): 10 TABLET, FILM COATED ORAL at 16:17

## 2021-09-12 RX ADMIN — SODIUM CHLORIDE 125 MILLILITER(S): 9 INJECTION INTRAMUSCULAR; INTRAVENOUS; SUBCUTANEOUS at 21:27

## 2021-09-12 RX ADMIN — MYCOPHENOLATE MOFETIL 1000 MILLIGRAM(S): 250 CAPSULE ORAL at 17:47

## 2021-09-12 RX ADMIN — SODIUM CHLORIDE 125 MILLILITER(S): 9 INJECTION INTRAMUSCULAR; INTRAVENOUS; SUBCUTANEOUS at 13:46

## 2021-09-12 RX ADMIN — OXYCODONE HYDROCHLORIDE 5 MILLIGRAM(S): 5 TABLET ORAL at 22:15

## 2021-09-12 RX ADMIN — MYCOPHENOLATE MOFETIL 1000 MILLIGRAM(S): 250 CAPSULE ORAL at 07:39

## 2021-09-12 RX ADMIN — OXYCODONE HYDROCHLORIDE 5 MILLIGRAM(S): 5 TABLET ORAL at 13:46

## 2021-09-12 RX ADMIN — SODIUM CHLORIDE 125 MILLILITER(S): 9 INJECTION INTRAMUSCULAR; INTRAVENOUS; SUBCUTANEOUS at 19:40

## 2021-09-12 RX ADMIN — GABAPENTIN 200 MILLIGRAM(S): 400 CAPSULE ORAL at 07:38

## 2021-09-12 RX ADMIN — GABAPENTIN 200 MILLIGRAM(S): 400 CAPSULE ORAL at 17:47

## 2021-09-12 RX ADMIN — PANTOPRAZOLE SODIUM 40 MILLIGRAM(S): 20 TABLET, DELAYED RELEASE ORAL at 07:38

## 2021-09-12 RX ADMIN — OXYCODONE HYDROCHLORIDE 5 MILLIGRAM(S): 5 TABLET ORAL at 08:20

## 2021-09-12 RX ADMIN — OXYCODONE HYDROCHLORIDE 5 MILLIGRAM(S): 5 TABLET ORAL at 12:06

## 2021-09-12 NOTE — DISCHARGE NOTE PROVIDER - NSFOLLOWUPCLINICS_GEN_ALL_ED_FT
[facility] [place]  Internal Medicine  [address] [place] [address]  Phone: [phone]  Fax: [phone]  Follow Up Time: 1 week

## 2021-09-12 NOTE — DISCHARGE NOTE PROVIDER - NSDCCPCAREPLAN_GEN_ALL_CORE_FT
PRINCIPAL DISCHARGE DIAGNOSIS  Diagnosis: Lower back pain  Assessment and Plan of Treatment: You were seen by pain mangement during admission.      SECONDARY DISCHARGE DIAGNOSES  Diagnosis: Sickle cell disease  Assessment and Plan of Treatment: Stay hydrated with water. Continue medications as prescribed. Follow up with your PCP for further evaluation and refills of pain medication. Please call to make an appointment      Diagnosis: Systemic lupus  Assessment and Plan of Treatment: You will need o follow up with your Rheumatologist within 1 week of discharge for further medical management.  Continue Prednisone and Cellcept as prescribed.    Diagnosis: History of pulmonary embolism  Assessment and Plan of Treatment: Continue Coumadin.  Day of discharge your INR was __________.  Follow up for repeat INR within _________days.   This wilman blood thinner, montior for signs and symptoms of bleeding.     PRINCIPAL DISCHARGE DIAGNOSIS  Diagnosis: Lower back pain  Assessment and Plan of Treatment: You were seen by pain mangement during admission.      SECONDARY DISCHARGE DIAGNOSES  Diagnosis: History of pulmonary embolism  Assessment and Plan of Treatment: Continue Coumadin.  Day of discharge your INR was __________.  Follow up for repeat INR within _________days.   This wilman blood thinner, montior for signs and symptoms of bleeding.     PRINCIPAL DISCHARGE DIAGNOSIS  Diagnosis: Lower back pain  Assessment and Plan of Treatment: You were seen by pain mangement during admission.      SECONDARY DISCHARGE DIAGNOSES  Diagnosis: History of pulmonary embolism  Assessment and Plan of Treatment: Continue Coumadin.  Day of discharge your INR was 2.02.  Follow up for repeat INR within 5-7 days.   This wilman blood thinner, montior for signs and symptoms of bleeding.

## 2021-09-12 NOTE — DISCHARGE NOTE PROVIDER - DETAILS OF MALNUTRITION DIAGNOSIS/DIAGNOSES
This patient has been assessed with a concern for Malnutrition and was treated during this hospitalization for the following Nutrition diagnosis/diagnoses:     -  09/15/2021: Severe protein-calorie malnutrition

## 2021-09-12 NOTE — PROGRESS NOTE ADULT - SUBJECTIVE AND OBJECTIVE BOX
Patient is a 51y old  Female who presents with a chief complaint of back pain/poor po intake (05 Sep 2021 21:41)      9/12/21    with headaches, generalized pain. Afebrile  Fatigue ++      PAST MEDICAL & SURGICAL HISTORY:  Lupus    Sickle cell disease    No significant past surgical history        Review of Systems:   CONSTITUTIONAL: No fever, weight loss, or fatigue  EYES: No eye pain, visual disturbances, or discharge  ENMT:  No difficulty hearing, tinnitus, vertigo; No sinus or throat pain  NECK: No pain or stiffness  BREASTS: No pain, masses, or nipple discharge  RESPIRATORY: No cough, wheezing, chills or hemoptysis; No shortness of breath  CARDIOVASCULAR: No chest pain, palpitations, dizziness, or leg swelling  GASTROINTESTINAL: No abdominal or epigastric pain. No nausea, vomiting, or hematemesis; No diarrhea or constipation. No melena or hematochezia.  GENITOURINARY: No dysuria, frequency, hematuria, or incontinence  NEUROLOGICAL: No headaches, memory loss, loss of strength, numbness, or tremors  SKIN: No itching, burning, rashes, or lesions   LYMPH NODES: No enlarged glands  ENDOCRINE: No heat or cold intolerance; No hair loss  MUSCULOSKELETAL: gen pain  PSYCHIATRIC: No depression, anxiety, mood swings, or difficulty sleeping  HEME/LYMPH: No easy bruising, or bleeding gums  ALLERY AND IMMUNOLOGIC: No hives or eczema    Allergies    No Known Allergies    Intolerances        Social History:     FAMILY HISTORY:  No pertinent family history in first degree relatives        MEDICATIONS  (STANDING):  heparin  Infusion.  Unit(s)/Hr (11 mL/Hr) IV Continuous <Continuous>  lidocaine   4% Patch 1 Patch Transdermal daily  mycophenolate mofetil 1000 milliGRAM(s) Oral two times a day  predniSONE   Tablet 5 milliGRAM(s) Oral daily  sodium chloride 0.9%. 1000 milliLiter(s) (100 mL/Hr) IV Continuous <Continuous>    MEDICATIONS  (PRN):  acetaminophen   Tablet .. 650 milliGRAM(s) Oral every 6 hours PRN Temp greater or equal to 38.5C (101.3F), Mild Pain (1 - 3)  aluminum hydroxide/magnesium hydroxide/simethicone Suspension 30 milliLiter(s) Oral every 4 hours PRN Dyspepsia  heparin   Injectable 5000 Unit(s) IV Push every 6 hours PRN For aPTT less than 40  heparin   Injectable 2500 Unit(s) IV Push every 6 hours PRN For aPTT between 40 - 57  melatonin 3 milliGRAM(s) Oral at bedtime PRN Insomnia  ondansetron Injectable 4 milliGRAM(s) IV Push every 8 hours PRN Nausea and/or Vomiting        CAPILLARY BLOOD GLUCOSE        I&O's Summary    06 Sep 2021 07:01  -  06 Sep 2021 23:45  --------------------------------------------------------  IN: 1266 mL / OUT: 250 mL / NET: 1016 mL        PHYSICAL EXAM:  Vital Signs Last 24 Hrs  T(C): 37.1 (06 Sep 2021 21:59), Max: 37.1 (06 Sep 2021 17:14)  T(F): 98.8 (06 Sep 2021 21:59), Max: 98.8 (06 Sep 2021 21:59)  HR: 93 (06 Sep 2021 21:59) (78 - 93)  BP: 112/61 (06 Sep 2021 21:59) (103/64 - 118/65)  BP(mean): --  RR: 18 (06 Sep 2021 21:59) (18 - 18)  SpO2: 97% (06 Sep 2021 21:59) (96% - 98%)    GENERAL: NAD, well-developed  HEAD:  Atraumatic, Normocephalic  EYES: EOMI, PERRLA, conjunctiva and sclera clear  NECK: Supple, No JVD  CHEST/LUNG: Clear to auscultation bilaterally; No wheeze  HEART: Regular rate and rhythm; No murmurs, rubs, or gallops  ABDOMEN: Soft, Nontender, Nondistended; Bowel sounds present  EXTREMITIES:  2+ Peripheral Pulses, No clubbing, cyanosis, or edema  PSYCH: AAOx3  NEUROLOGY: non-focal  SKIN: No rashes or lesions    LABS:                        7.2    11.00 )-----------( 249      ( 06 Sep 2021 20:22 )             21.5     09-06    140  |  104  |  12  ----------------------------<  100<H>  3.5   |  23  |  0.76    Ca    9.5      06 Sep 2021 07:10    TPro  7.2  /  Alb  4.1  /  TBili  1.6<H>  /  DBili  x   /  AST  16  /  ALT  16  /  AlkPhos  150<H>  09-06    PT/INR - ( 06 Sep 2021 07:10 )   PT: 14.3 sec;   INR: 1.27 ratio         PTT - ( 06 Sep 2021 20:22 )  PTT:151.5 sec      Urinalysis Basic - ( 05 Sep 2021 19:00 )    Color: Yellow / Appearance: Clear / SG: >1.050 / pH: x  Gluc: x / Ketone: Trace  / Bili: Negative / Urobili: 3 mg/dL   Blood: x / Protein: Trace / Nitrite: Negative   Leuk Esterase: Negative / RBC: 1 /HPF / WBC 3 /HPF   Sq Epi: x / Non Sq Epi: 0 /HPF / Bacteria: Negative        RADIOLOGY & ADDITIONAL TESTS:    Imaging Personally Reviewed:    Consultant(s) Notes Reviewed:      Care Discussed with Consultants/Other Providers:

## 2021-09-12 NOTE — DISCHARGE NOTE PROVIDER - PROVIDER TOKENS
FREE:[LAST:[PROVIDER],FIRST:[PRIMARY CARE],PHONE:[(   )    -],FAX:[(   )    -],FOLLOWUP:[1 week],ESTABLISHEDPATIENT:[T]]

## 2021-09-12 NOTE — DISCHARGE NOTE PROVIDER - CARE PROVIDER_API CALL
PROVIDER, PRIMARY CARE  Phone: (   )    -  Fax: (   )    -  Established Patient  Follow Up Time: 1 week

## 2021-09-12 NOTE — DISCHARGE NOTE PROVIDER - HOSPITAL COURSE
Patient reports persistent and same whole body, worst in the low back, b/l thighs, and b/l hips described to be 10/10, constant, sharp quality. Denies radiation of back pain to legs, muscle weakness, saddle anesthesia, fecal/urinary incontinence. Patient does report stiffness in the legs with standing from a seated position and walking. Patient denies issues with lifting arms overhead, arm stiffness, new HA. Reports decreased b/l visual acuity but no blindness. Admits to toe paresthesias for 2 days.  Denies fevers, chills, night sweats, chest pain, SOB, abdominal pain, n/v, changes in urinary freq, foamy urine, dysuria. Admits to constipation - last BM Sunday.      Hospital Course:                                        Dispo- On ________, patient medically optimized for discharge as per attending, Dr. Zepeda.  All medications reviewed prior o discharge. 52 y/o Female with PMHx lupus, SCD, hx of PE on warfarin reports persistent and same whole body, worst in the low back, b/l thighs, and b/l hips described to be 10/10. CT Lumbar spine without concerning findings. MR lumbar spine with disc dessication at L3-L4 which is secondary to chronic degenerative change. Pain likely due to sickle cell crisis and has improved with pain medications and IVF. Patient noted to have subtherapeutic INR      50 y/o Female with PMHx lupus, SCD, hx of PE on warfarin reports persistent and same whole body, worst in the low back, b/l thighs, and b/l hips described to be 10/10. CT Lumbar spine without concerning findings. MR lumbar spine with disc dessication at L3-L4 which is secondary to chronic degenerative change. Pain likely due to sickle cell crisis and has improved with pain medications and IVF. Patient noted to have subtherapeutic INR   On day of discharge INR was 2.02. Patient to follow up with primary care provider and monitor INR as outpatient     On 9/16/2021 case was discussed with Dr. Stevie Zepeda, patient is medically cleared and optimized for discharge today. All medications were reviewed with attending, and sent to mutually agreed upon pharmacy.

## 2021-09-12 NOTE — PROGRESS NOTE ADULT - SUBJECTIVE AND OBJECTIVE BOX
Pt has pain all over, takes oxycodone twice in 24h on an average and that is able to control the pain. Pain worse when IVF stopped. No cough, SOB, fevers or chills and ROS is otherwise unremarkable.     Meds:  acetaminophen   Tablet .. 650 milliGRAM(s) Oral every 6 hours PRN  aluminum hydroxide/magnesium hydroxide/simethicone Suspension 30 milliLiter(s) Oral every 4 hours PRN  cyclobenzaprine 5 milliGRAM(s) Oral every 8 hours PRN  folic acid 1 milliGRAM(s) Oral daily  gabapentin 200 milliGRAM(s) Oral every 12 hours  lidocaine   4% Patch 1 Patch Transdermal daily  melatonin 3 milliGRAM(s) Oral at bedtime PRN  mycophenolate mofetil 1000 milliGRAM(s) Oral two times a day  ondansetron Injectable 4 milliGRAM(s) IV Push every 8 hours PRN  oxyCODONE    IR 5 milliGRAM(s) Oral every 6 hours PRN  pantoprazole    Tablet 40 milliGRAM(s) Oral before breakfast  predniSONE   Tablet 5 milliGRAM(s) Oral daily  sodium chloride 0.45%. 1000 milliLiter(s) IV Continuous <Continuous>      Vital Signs Last 24 Hrs  T(C): 37 (12 Sep 2021 09:36), Max: 37 (12 Sep 2021 09:36)  T(F): 98.6 (12 Sep 2021 09:36), Max: 98.6 (12 Sep 2021 09:36)  HR: 87 (12 Sep 2021 09:36) (76 - 87)  BP: 107/59 (12 Sep 2021 09:36) (107/57 - 119/63)  BP(mean): --  RR: 17 (12 Sep 2021 09:36) (16 - 19)  SpO2: 97% (12 Sep 2021 09:36) (96% - 97%)                          10.1   8.00  )-----------( 286      ( 12 Sep 2021 06:58 )             32.0       09-12    139  |  101  |  8   ----------------------------<  98  3.7   |  24  |  0.60    Ca    9.5      12 Sep 2021 06:58  Phos  3.6     09-12  Mg     2.00     09-12    TPro  6.6  /  Alb  x   /  TBili  x   /  DBili  x   /  AST  x   /  ALT  x   /  AlkPhos  x   09-11              PT/INR - ( 12 Sep 2021 06:58 )   PT: 21.3 sec;   INR: 1.93 ratio         PTT - ( 12 Sep 2021 06:58 )  PTT:35.1 sec

## 2021-09-12 NOTE — DISCHARGE NOTE PROVIDER - NSDCMRMEDTOKEN_GEN_ALL_CORE_FT
acetaminophen 325 mg oral tablet: 2 tab(s) orally every 6 hours, As Needed  mycophenolate mofetil 500 mg oral tablet: 2 tab(s) orally 2 times a day  predniSONE 5 mg oral tablet: Per pharmacy &amp; Patient:   Tapering Dose: Start 9/1:  3 tabs daily until 9/7 then taper to 1 tablet daily as per doctor.   warfarin 5 mg oral tablet: Per Patient: 1 tab(s) orally once a day   acetaminophen 325 mg oral tablet: 2 tab(s) orally every 6 hours, As Needed  folic acid 1 mg oral tablet: 1 tab(s) orally once a day  gabapentin 100 mg oral capsule: 2 cap(s) orally every 12 hours  mycophenolate mofetil 500 mg oral tablet: 2 tab(s) orally 2 times a day  pantoprazole 40 mg oral delayed release tablet: 1 tab(s) orally once a day (before a meal)  predniSONE 5 mg oral tablet: Per pharmacy &amp; Patient:   Tapering Dose: Start 9/1:  3 tabs daily until 9/7 then taper to 1 tablet daily as per doctor.   warfarin 5 mg oral tablet: Per Patient: 1 tab(s) orally once a day

## 2021-09-13 ENCOUNTER — TRANSCRIPTION ENCOUNTER (OUTPATIENT)
Age: 51
End: 2021-09-13

## 2021-09-13 LAB
ANION GAP SERPL CALC-SCNC: 12 MMOL/L — SIGNIFICANT CHANGE UP (ref 7–14)
APTT BLD: 31.8 SEC — SIGNIFICANT CHANGE UP (ref 27–36.3)
BASOPHILS # BLD AUTO: 0.04 K/UL — SIGNIFICANT CHANGE UP (ref 0–0.2)
BASOPHILS NFR BLD AUTO: 0.5 % — SIGNIFICANT CHANGE UP (ref 0–2)
BUN SERPL-MCNC: 8 MG/DL — SIGNIFICANT CHANGE UP (ref 7–23)
CALCIUM SERPL-MCNC: 9.4 MG/DL — SIGNIFICANT CHANGE UP (ref 8.4–10.5)
CHLORIDE SERPL-SCNC: 105 MMOL/L — SIGNIFICANT CHANGE UP (ref 98–107)
CO2 SERPL-SCNC: 22 MMOL/L — SIGNIFICANT CHANGE UP (ref 22–31)
CREAT SERPL-MCNC: 0.57 MG/DL — SIGNIFICANT CHANGE UP (ref 0.5–1.3)
EOSINOPHIL # BLD AUTO: 0.26 K/UL — SIGNIFICANT CHANGE UP (ref 0–0.5)
EOSINOPHIL NFR BLD AUTO: 3.3 % — SIGNIFICANT CHANGE UP (ref 0–6)
GLUCOSE SERPL-MCNC: 95 MG/DL — SIGNIFICANT CHANGE UP (ref 70–99)
HCT VFR BLD CALC: 26.4 % — LOW (ref 34.5–45)
HGB BLD-MCNC: 9.7 G/DL — LOW (ref 11.5–15.5)
IANC: 3.63 K/UL — SIGNIFICANT CHANGE UP (ref 1.5–8.5)
IMM GRANULOCYTES NFR BLD AUTO: 0.4 % — SIGNIFICANT CHANGE UP (ref 0–1.5)
INR BLD: 1.86 RATIO — HIGH (ref 0.88–1.16)
LYMPHOCYTES # BLD AUTO: 3.14 K/UL — SIGNIFICANT CHANGE UP (ref 1–3.3)
LYMPHOCYTES # BLD AUTO: 40.2 % — SIGNIFICANT CHANGE UP (ref 13–44)
MAGNESIUM SERPL-MCNC: 1.9 MG/DL — SIGNIFICANT CHANGE UP (ref 1.6–2.6)
MCHC RBC-ENTMCNC: 35.1 PG — HIGH (ref 27–34)
MCHC RBC-ENTMCNC: 36.7 GM/DL — HIGH (ref 32–36)
MCV RBC AUTO: 95.7 FL — SIGNIFICANT CHANGE UP (ref 80–100)
MONOCYTES # BLD AUTO: 0.71 K/UL — SIGNIFICANT CHANGE UP (ref 0–0.9)
MONOCYTES NFR BLD AUTO: 9.1 % — SIGNIFICANT CHANGE UP (ref 2–14)
NEUTROPHILS # BLD AUTO: 3.63 K/UL — SIGNIFICANT CHANGE UP (ref 1.8–7.4)
NEUTROPHILS NFR BLD AUTO: 46.5 % — SIGNIFICANT CHANGE UP (ref 43–77)
NRBC # BLD: 16 /100 WBCS — SIGNIFICANT CHANGE UP
NRBC # FLD: 1.28 K/UL — HIGH
PHOSPHATE SERPL-MCNC: 3.5 MG/DL — SIGNIFICANT CHANGE UP (ref 2.5–4.5)
PLATELET # BLD AUTO: 277 K/UL — SIGNIFICANT CHANGE UP (ref 150–400)
POTASSIUM SERPL-MCNC: 3.6 MMOL/L — SIGNIFICANT CHANGE UP (ref 3.5–5.3)
POTASSIUM SERPL-SCNC: 3.6 MMOL/L — SIGNIFICANT CHANGE UP (ref 3.5–5.3)
PROTHROM AB SERPL-ACNC: 20.8 SEC — HIGH (ref 10.6–13.6)
RBC # BLD: 2.76 M/UL — LOW (ref 3.8–5.2)
RBC # FLD: 14.6 % — HIGH (ref 10.3–14.5)
SODIUM SERPL-SCNC: 139 MMOL/L — SIGNIFICANT CHANGE UP (ref 135–145)
WBC # BLD: 7.81 K/UL — SIGNIFICANT CHANGE UP (ref 3.8–10.5)
WBC # FLD AUTO: 7.81 K/UL — SIGNIFICANT CHANGE UP (ref 3.8–10.5)

## 2021-09-13 RX ORDER — WARFARIN SODIUM 2.5 MG/1
6 TABLET ORAL ONCE
Refills: 0 | Status: COMPLETED | OUTPATIENT
Start: 2021-09-13 | End: 2021-09-13

## 2021-09-13 RX ADMIN — GABAPENTIN 200 MILLIGRAM(S): 400 CAPSULE ORAL at 17:34

## 2021-09-13 RX ADMIN — Medication 1 MILLIGRAM(S): at 12:25

## 2021-09-13 RX ADMIN — OXYCODONE HYDROCHLORIDE 5 MILLIGRAM(S): 5 TABLET ORAL at 12:25

## 2021-09-13 RX ADMIN — OXYCODONE HYDROCHLORIDE 5 MILLIGRAM(S): 5 TABLET ORAL at 12:55

## 2021-09-13 RX ADMIN — OXYCODONE HYDROCHLORIDE 5 MILLIGRAM(S): 5 TABLET ORAL at 06:19

## 2021-09-13 RX ADMIN — PANTOPRAZOLE SODIUM 40 MILLIGRAM(S): 20 TABLET, DELAYED RELEASE ORAL at 07:46

## 2021-09-13 RX ADMIN — OXYCODONE HYDROCHLORIDE 5 MILLIGRAM(S): 5 TABLET ORAL at 07:15

## 2021-09-13 RX ADMIN — GABAPENTIN 200 MILLIGRAM(S): 400 CAPSULE ORAL at 07:45

## 2021-09-13 RX ADMIN — WARFARIN SODIUM 6 MILLIGRAM(S): 2.5 TABLET ORAL at 17:34

## 2021-09-13 RX ADMIN — Medication 5 MILLIGRAM(S): at 07:45

## 2021-09-13 RX ADMIN — MYCOPHENOLATE MOFETIL 1000 MILLIGRAM(S): 250 CAPSULE ORAL at 17:34

## 2021-09-13 RX ADMIN — MYCOPHENOLATE MOFETIL 1000 MILLIGRAM(S): 250 CAPSULE ORAL at 07:45

## 2021-09-13 NOTE — DISCHARGE NOTE NURSING/CASE MANAGEMENT/SOCIAL WORK - NSDCPECAREGIVERED_GEN_ALL_CORE
sickle cell crisis, PE, oxycodone, pain/Yes Medline and carenotes for sickle cell, pain management, as well as DC Medications and side effects literature for patient reference./Yes

## 2021-09-13 NOTE — DISCHARGE NOTE NURSING/CASE MANAGEMENT/SOCIAL WORK - NSDCPEFALRISK_GEN_ALL_CORE
For information on Fall & injury Prevention, visit [email or web address]

## 2021-09-13 NOTE — DISCHARGE NOTE NURSING/CASE MANAGEMENT/SOCIAL WORK - PATIENT PORTAL LINK FT
You can access the FollowMyHealth Patient Portal offered by [facility] by registering at the following website: [email or web address]. By joining [facility]’s FollowMyHealth portal, you will also be able to view your health information using other applications (apps) compatible with our system.

## 2021-09-13 NOTE — DISCHARGE NOTE NURSING/CASE MANAGEMENT/SOCIAL WORK - NSDPDISTO_GEN_ALL_CORE
Home Pt A+OX 4. VS stable Afebrile. pt with positive bowel sounds sri po diet. Voiding. Seen by MD and cleared for Dc to home as per safe Dc plan./Home

## 2021-09-13 NOTE — DISCHARGE NOTE NURSING/CASE MANAGEMENT/SOCIAL WORK - NSDCPNINST_GEN_ALL_CORE
Call MD for follow up appointment. Drink plenty of fluids. Call MD with any signs of infection, persistent nausea or vomiting, for follow up appointment. Continue to drink plenty of fluids. Follow-up with PMD for continuity of care.

## 2021-09-13 NOTE — PROGRESS NOTE ADULT - SUBJECTIVE AND OBJECTIVE BOX
Patient is a 51y old  Female who presents with a chief complaint of back pain/poor po intake (05 Sep 2021 21:41)      9/13/21    with headaches, generalized pain.  Afebrile  Fatigue ++      PAST MEDICAL & SURGICAL HISTORY:  Lupus    Sickle cell disease    No significant past surgical history        Review of Systems:   CONSTITUTIONAL: No fever, weight loss, or fatigue  EYES: No eye pain, visual disturbances, or discharge  ENMT:  No difficulty hearing, tinnitus, vertigo; No sinus or throat pain  NECK: No pain or stiffness  BREASTS: No pain, masses, or nipple discharge  RESPIRATORY: No cough, wheezing, chills or hemoptysis; No shortness of breath  CARDIOVASCULAR: No chest pain, palpitations, dizziness, or leg swelling  GASTROINTESTINAL: No abdominal or epigastric pain. No nausea, vomiting, or hematemesis; No diarrhea or constipation. No melena or hematochezia.  GENITOURINARY: No dysuria, frequency, hematuria, or incontinence  NEUROLOGICAL: No headaches, memory loss, loss of strength, numbness, or tremors  SKIN: No itching, burning, rashes, or lesions   LYMPH NODES: No enlarged glands  ENDOCRINE: No heat or cold intolerance; No hair loss  MUSCULOSKELETAL: gen pain  PSYCHIATRIC: No depression, anxiety, mood swings, or difficulty sleeping  HEME/LYMPH: No easy bruising, or bleeding gums  ALLERY AND IMMUNOLOGIC: No hives or eczema    Allergies    No Known Allergies    Intolerances        Social History:     FAMILY HISTORY:  No pertinent family history in first degree relatives        MEDICATIONS  (STANDING):  heparin  Infusion.  Unit(s)/Hr (11 mL/Hr) IV Continuous <Continuous>  lidocaine   4% Patch 1 Patch Transdermal daily  mycophenolate mofetil 1000 milliGRAM(s) Oral two times a day  predniSONE   Tablet 5 milliGRAM(s) Oral daily  sodium chloride 0.9%. 1000 milliLiter(s) (100 mL/Hr) IV Continuous <Continuous>    MEDICATIONS  (PRN):  acetaminophen   Tablet .. 650 milliGRAM(s) Oral every 6 hours PRN Temp greater or equal to 38.5C (101.3F), Mild Pain (1 - 3)  aluminum hydroxide/magnesium hydroxide/simethicone Suspension 30 milliLiter(s) Oral every 4 hours PRN Dyspepsia  heparin   Injectable 5000 Unit(s) IV Push every 6 hours PRN For aPTT less than 40  heparin   Injectable 2500 Unit(s) IV Push every 6 hours PRN For aPTT between 40 - 57  melatonin 3 milliGRAM(s) Oral at bedtime PRN Insomnia  ondansetron Injectable 4 milliGRAM(s) IV Push every 8 hours PRN Nausea and/or Vomiting        CAPILLARY BLOOD GLUCOSE        I&O's Summary    06 Sep 2021 07:01  -  06 Sep 2021 23:45  --------------------------------------------------------  IN: 1266 mL / OUT: 250 mL / NET: 1016 mL        PHYSICAL EXAM:  Vital Signs Last 24 Hrs  T(C): 37.1 (06 Sep 2021 21:59), Max: 37.1 (06 Sep 2021 17:14)  T(F): 98.8 (06 Sep 2021 21:59), Max: 98.8 (06 Sep 2021 21:59)  HR: 93 (06 Sep 2021 21:59) (78 - 93)  BP: 112/61 (06 Sep 2021 21:59) (103/64 - 118/65)  BP(mean): --  RR: 18 (06 Sep 2021 21:59) (18 - 18)  SpO2: 97% (06 Sep 2021 21:59) (96% - 98%)    GENERAL: NAD, well-developed  HEAD:  Atraumatic, Normocephalic  EYES: EOMI, PERRLA, conjunctiva and sclera clear  NECK: Supple, No JVD  CHEST/LUNG: Clear to auscultation bilaterally; No wheeze  HEART: Regular rate and rhythm; No murmurs, rubs, or gallops  ABDOMEN: Soft, Nontender, Nondistended; Bowel sounds present  EXTREMITIES:  2+ Peripheral Pulses, No clubbing, cyanosis, or edema  PSYCH: AAOx3  NEUROLOGY: non-focal  SKIN: No rashes or lesions    LABS:                        7.2    11.00 )-----------( 249      ( 06 Sep 2021 20:22 )             21.5     09-06    140  |  104  |  12  ----------------------------<  100<H>  3.5   |  23  |  0.76    Ca    9.5      06 Sep 2021 07:10    TPro  7.2  /  Alb  4.1  /  TBili  1.6<H>  /  DBili  x   /  AST  16  /  ALT  16  /  AlkPhos  150<H>  09-06    PT/INR - ( 06 Sep 2021 07:10 )   PT: 14.3 sec;   INR: 1.27 ratio         PTT - ( 06 Sep 2021 20:22 )  PTT:151.5 sec      Urinalysis Basic - ( 05 Sep 2021 19:00 )    Color: Yellow / Appearance: Clear / SG: >1.050 / pH: x  Gluc: x / Ketone: Trace  / Bili: Negative / Urobili: 3 mg/dL   Blood: x / Protein: Trace / Nitrite: Negative   Leuk Esterase: Negative / RBC: 1 /HPF / WBC 3 /HPF   Sq Epi: x / Non Sq Epi: 0 /HPF / Bacteria: Negative        RADIOLOGY & ADDITIONAL TESTS:    Imaging Personally Reviewed:    Consultant(s) Notes Reviewed:      Care Discussed with Consultants/Other Providers:

## 2021-09-14 LAB
% ALBUMIN: 48.1 % — SIGNIFICANT CHANGE UP
% ALBUMIN: SIGNIFICANT CHANGE UP %
% ALBUMIN: SIGNIFICANT CHANGE UP %
% ALPHA 1: 4.5 % — SIGNIFICANT CHANGE UP
% ALPHA 1: SIGNIFICANT CHANGE UP %
% ALPHA 1: SIGNIFICANT CHANGE UP %
% ALPHA 2: 11.9 % — SIGNIFICANT CHANGE UP
% ALPHA 2: SIGNIFICANT CHANGE UP %
% ALPHA 2: SIGNIFICANT CHANGE UP %
% BETA: 16.3 % — SIGNIFICANT CHANGE UP
% GAMMA: 19.3 % — SIGNIFICANT CHANGE UP
% GAMMA: SIGNIFICANT CHANGE UP %
% GAMMA: SIGNIFICANT CHANGE UP %
ALBUMIN SERPL ELPH-MCNC: 2.98 G/DL — LOW (ref 3.3–4.4)
ALBUMIN SERPL ELPH-MCNC: SIGNIFICANT CHANGE UP G/DL (ref 3.3–4.4)
ALBUMIN SERPL ELPH-MCNC: SIGNIFICANT CHANGE UP G/DL (ref 3.3–4.4)
ALBUMIN/GLOB SERPL ELPH: 0.9 RATIO — SIGNIFICANT CHANGE UP
ALPHA1 GLOB SERPL ELPH-MCNC: 0.28 G/DL — SIGNIFICANT CHANGE UP (ref 0.1–0.3)
ALPHA1 GLOB SERPL ELPH-MCNC: SIGNIFICANT CHANGE UP G/DL (ref 0.1–0.3)
ALPHA1 GLOB SERPL ELPH-MCNC: SIGNIFICANT CHANGE UP G/DL (ref 0.1–0.3)
ALPHA2 GLOB SERPL ELPH-MCNC: 0.7 G/DL — SIGNIFICANT CHANGE UP (ref 0.6–1)
ALPHA2 GLOB SERPL ELPH-MCNC: SIGNIFICANT CHANGE UP G/DL (ref 0.6–1)
ALPHA2 GLOB SERPL ELPH-MCNC: SIGNIFICANT CHANGE UP G/DL (ref 0.6–1)
ANION GAP SERPL CALC-SCNC: 13 MMOL/L — SIGNIFICANT CHANGE UP (ref 7–14)
APTT BLD: 32 SEC — SIGNIFICANT CHANGE UP (ref 27–36.3)
B-GLOBULIN SERPL ELPH-MCNC: 1.01 G/DL — SIGNIFICANT CHANGE UP (ref 0.6–1.1)
B-GLOBULIN SERPL ELPH-MCNC: SIGNIFICANT CHANGE UP G/DL (ref 0.6–1.1)
B-GLOBULIN SERPL ELPH-MCNC: SIGNIFICANT CHANGE UP G/DL (ref 0.6–1.1)
BUN SERPL-MCNC: 6 MG/DL — LOW (ref 7–23)
CALCIUM SERPL-MCNC: 9.8 MG/DL — SIGNIFICANT CHANGE UP (ref 8.4–10.5)
CHLORIDE SERPL-SCNC: 104 MMOL/L — SIGNIFICANT CHANGE UP (ref 98–107)
CO2 SERPL-SCNC: 22 MMOL/L — SIGNIFICANT CHANGE UP (ref 22–31)
CREAT SERPL-MCNC: 0.53 MG/DL — SIGNIFICANT CHANGE UP (ref 0.5–1.3)
GAMMA GLOBULIN: 1.2 G/DL — SIGNIFICANT CHANGE UP (ref 0.7–1.7)
GAMMA GLOBULIN: SIGNIFICANT CHANGE UP G/DL (ref 0.7–1.7)
GAMMA GLOBULIN: SIGNIFICANT CHANGE UP G/DL (ref 0.7–1.7)
GLUCOSE SERPL-MCNC: 88 MG/DL — SIGNIFICANT CHANGE UP (ref 70–99)
HCT VFR BLD CALC: 26.4 % — LOW (ref 34.5–45)
HGB BLD-MCNC: 9.7 G/DL — LOW (ref 11.5–15.5)
INR BLD: 1.66 RATIO — HIGH (ref 0.88–1.16)
MAGNESIUM SERPL-MCNC: 2 MG/DL — SIGNIFICANT CHANGE UP (ref 1.6–2.6)
MCHC RBC-ENTMCNC: 35.1 PG — HIGH (ref 27–34)
MCHC RBC-ENTMCNC: 36.7 GM/DL — HIGH (ref 32–36)
MCV RBC AUTO: 95.7 FL — SIGNIFICANT CHANGE UP (ref 80–100)
NRBC # BLD: 9 /100 WBCS — SIGNIFICANT CHANGE UP
NRBC # FLD: 0.78 K/UL — HIGH
PHOSPHATE SERPL-MCNC: 3.4 MG/DL — SIGNIFICANT CHANGE UP (ref 2.5–4.5)
PLATELET # BLD AUTO: 300 K/UL — SIGNIFICANT CHANGE UP (ref 150–400)
POTASSIUM SERPL-MCNC: 3.7 MMOL/L — SIGNIFICANT CHANGE UP (ref 3.5–5.3)
POTASSIUM SERPL-SCNC: 3.7 MMOL/L — SIGNIFICANT CHANGE UP (ref 3.5–5.3)
PROT PATTERN SERPL ELPH-IMP: SIGNIFICANT CHANGE UP
PROT SERPL-MCNC: 6.2 G/DL — SIGNIFICANT CHANGE UP
PROT SERPL-MCNC: SIGNIFICANT CHANGE UP G/DL
PROT SERPL-MCNC: SIGNIFICANT CHANGE UP G/DL
PROTHROM AB SERPL-ACNC: 18.6 SEC — HIGH (ref 10.6–13.6)
RBC # BLD: 2.76 M/UL — LOW (ref 3.8–5.2)
RBC # FLD: 14.5 % — SIGNIFICANT CHANGE UP (ref 10.3–14.5)
SODIUM SERPL-SCNC: 139 MMOL/L — SIGNIFICANT CHANGE UP (ref 135–145)
WBC # BLD: 8.38 K/UL — SIGNIFICANT CHANGE UP (ref 3.8–10.5)
WBC # FLD AUTO: 8.38 K/UL — SIGNIFICANT CHANGE UP (ref 3.8–10.5)

## 2021-09-14 RX ORDER — WARFARIN SODIUM 2.5 MG/1
7.5 TABLET ORAL ONCE
Refills: 0 | Status: COMPLETED | OUTPATIENT
Start: 2021-09-14 | End: 2021-09-14

## 2021-09-14 RX ORDER — WARFARIN SODIUM 2.5 MG/1
6 TABLET ORAL ONCE
Refills: 0 | Status: COMPLETED | OUTPATIENT
Start: 2021-09-14 | End: 2021-09-14

## 2021-09-14 RX ORDER — SODIUM CHLORIDE 9 MG/ML
1000 INJECTION INTRAMUSCULAR; INTRAVENOUS; SUBCUTANEOUS
Refills: 0 | Status: DISCONTINUED | OUTPATIENT
Start: 2021-09-14 | End: 2021-09-16

## 2021-09-14 RX ADMIN — GABAPENTIN 200 MILLIGRAM(S): 400 CAPSULE ORAL at 05:14

## 2021-09-14 RX ADMIN — PANTOPRAZOLE SODIUM 40 MILLIGRAM(S): 20 TABLET, DELAYED RELEASE ORAL at 05:14

## 2021-09-14 RX ADMIN — GABAPENTIN 200 MILLIGRAM(S): 400 CAPSULE ORAL at 17:31

## 2021-09-14 RX ADMIN — Medication 650 MILLIGRAM(S): at 05:13

## 2021-09-14 RX ADMIN — Medication 650 MILLIGRAM(S): at 20:34

## 2021-09-14 RX ADMIN — MYCOPHENOLATE MOFETIL 1000 MILLIGRAM(S): 250 CAPSULE ORAL at 17:31

## 2021-09-14 RX ADMIN — MYCOPHENOLATE MOFETIL 1000 MILLIGRAM(S): 250 CAPSULE ORAL at 05:14

## 2021-09-14 RX ADMIN — Medication 650 MILLIGRAM(S): at 06:00

## 2021-09-14 RX ADMIN — SODIUM CHLORIDE 75 MILLILITER(S): 9 INJECTION INTRAMUSCULAR; INTRAVENOUS; SUBCUTANEOUS at 13:13

## 2021-09-14 RX ADMIN — OXYCODONE HYDROCHLORIDE 5 MILLIGRAM(S): 5 TABLET ORAL at 09:36

## 2021-09-14 RX ADMIN — OXYCODONE HYDROCHLORIDE 5 MILLIGRAM(S): 5 TABLET ORAL at 10:30

## 2021-09-14 RX ADMIN — Medication 5 MILLIGRAM(S): at 05:14

## 2021-09-14 RX ADMIN — WARFARIN SODIUM 7.5 MILLIGRAM(S): 2.5 TABLET ORAL at 17:31

## 2021-09-14 NOTE — PROGRESS NOTE ADULT - SUBJECTIVE AND OBJECTIVE BOX
Patient is a 51y old  Female who presents with a chief complaint of back pain/poor po intake (05 Sep 2021 21:41)      9/14/21    Back pain +. Improved from earlier  Afebrile  Fatigue ++      PAST MEDICAL & SURGICAL HISTORY:  Lupus    Sickle cell disease    No significant past surgical history        Review of Systems:   CONSTITUTIONAL: No fever, weight loss, or fatigue  EYES: No eye pain, visual disturbances, or discharge  ENMT:  No difficulty hearing, tinnitus, vertigo; No sinus or throat pain  NECK: No pain or stiffness  BREASTS: No pain, masses, or nipple discharge  RESPIRATORY: No cough, wheezing, chills or hemoptysis; No shortness of breath  CARDIOVASCULAR: No chest pain, palpitations, dizziness, or leg swelling  GASTROINTESTINAL: No abdominal or epigastric pain. No nausea, vomiting, or hematemesis; No diarrhea or constipation. No melena or hematochezia.  GENITOURINARY: No dysuria, frequency, hematuria, or incontinence  NEUROLOGICAL: No headaches, memory loss, loss of strength, numbness, or tremors  SKIN: No itching, burning, rashes, or lesions   LYMPH NODES: No enlarged glands  ENDOCRINE: No heat or cold intolerance; No hair loss  MUSCULOSKELETAL: gen pain  PSYCHIATRIC: No depression, anxiety, mood swings, or difficulty sleeping  HEME/LYMPH: No easy bruising, or bleeding gums  ALLERY AND IMMUNOLOGIC: No hives or eczema    Allergies    No Known Allergies    Intolerances        Social History:     FAMILY HISTORY:  No pertinent family history in first degree relatives        MEDICATIONS  (STANDING):  heparin  Infusion.  Unit(s)/Hr (11 mL/Hr) IV Continuous <Continuous>  lidocaine   4% Patch 1 Patch Transdermal daily  mycophenolate mofetil 1000 milliGRAM(s) Oral two times a day  predniSONE   Tablet 5 milliGRAM(s) Oral daily  sodium chloride 0.9%. 1000 milliLiter(s) (100 mL/Hr) IV Continuous <Continuous>    MEDICATIONS  (PRN):  acetaminophen   Tablet .. 650 milliGRAM(s) Oral every 6 hours PRN Temp greater or equal to 38.5C (101.3F), Mild Pain (1 - 3)  aluminum hydroxide/magnesium hydroxide/simethicone Suspension 30 milliLiter(s) Oral every 4 hours PRN Dyspepsia  heparin   Injectable 5000 Unit(s) IV Push every 6 hours PRN For aPTT less than 40  heparin   Injectable 2500 Unit(s) IV Push every 6 hours PRN For aPTT between 40 - 57  melatonin 3 milliGRAM(s) Oral at bedtime PRN Insomnia  ondansetron Injectable 4 milliGRAM(s) IV Push every 8 hours PRN Nausea and/or Vomiting        CAPILLARY BLOOD GLUCOSE        I&O's Summary    06 Sep 2021 07:01  -  06 Sep 2021 23:45  --------------------------------------------------------  IN: 1266 mL / OUT: 250 mL / NET: 1016 mL        PHYSICAL EXAM:  Vital Signs Last 24 Hrs  T(C): 37.1 (06 Sep 2021 21:59), Max: 37.1 (06 Sep 2021 17:14)  T(F): 98.8 (06 Sep 2021 21:59), Max: 98.8 (06 Sep 2021 21:59)  HR: 93 (06 Sep 2021 21:59) (78 - 93)  BP: 112/61 (06 Sep 2021 21:59) (103/64 - 118/65)  BP(mean): --  RR: 18 (06 Sep 2021 21:59) (18 - 18)  SpO2: 97% (06 Sep 2021 21:59) (96% - 98%)    GENERAL: NAD, well-developed  HEAD:  Atraumatic, Normocephalic  EYES: EOMI, PERRLA, conjunctiva and sclera clear  NECK: Supple, No JVD  CHEST/LUNG: Clear to auscultation bilaterally; No wheeze  HEART: Regular rate and rhythm; No murmurs, rubs, or gallops  ABDOMEN: Soft, Nontender, Nondistended; Bowel sounds present  EXTREMITIES:  2+ Peripheral Pulses, No clubbing, cyanosis, or edema  PSYCH: AAOx3  NEUROLOGY: non-focal  SKIN: No rashes or lesions    LABS:                        7.2    11.00 )-----------( 249      ( 06 Sep 2021 20:22 )             21.5     09-06    140  |  104  |  12  ----------------------------<  100<H>  3.5   |  23  |  0.76    Ca    9.5      06 Sep 2021 07:10    TPro  7.2  /  Alb  4.1  /  TBili  1.6<H>  /  DBili  x   /  AST  16  /  ALT  16  /  AlkPhos  150<H>  09-06    PT/INR - ( 06 Sep 2021 07:10 )   PT: 14.3 sec;   INR: 1.27 ratio         PTT - ( 06 Sep 2021 20:22 )  PTT:151.5 sec      Urinalysis Basic - ( 05 Sep 2021 19:00 )    Color: Yellow / Appearance: Clear / SG: >1.050 / pH: x  Gluc: x / Ketone: Trace  / Bili: Negative / Urobili: 3 mg/dL   Blood: x / Protein: Trace / Nitrite: Negative   Leuk Esterase: Negative / RBC: 1 /HPF / WBC 3 /HPF   Sq Epi: x / Non Sq Epi: 0 /HPF / Bacteria: Negative        RADIOLOGY & ADDITIONAL TESTS:    Imaging Personally Reviewed:    Consultant(s) Notes Reviewed:      Care Discussed with Consultants/Other Providers:

## 2021-09-14 NOTE — PROGRESS NOTE ADULT - SUBJECTIVE AND OBJECTIVE BOX
Pt did well yesterday, felt better, has some aches and pains today, IVF out because of venous access issues. No fevers, chills, no cough, SOB and a detailed ROS is otherwise unremarkable except for lightheadedness. On her phone.         Meds:  acetaminophen   Tablet .. 650 milliGRAM(s) Oral every 6 hours PRN  aluminum hydroxide/magnesium hydroxide/simethicone Suspension 30 milliLiter(s) Oral every 4 hours PRN  cyclobenzaprine 5 milliGRAM(s) Oral every 8 hours PRN  folic acid 1 milliGRAM(s) Oral daily  gabapentin 200 milliGRAM(s) Oral every 12 hours  lidocaine   4% Patch 1 Patch Transdermal daily  melatonin 3 milliGRAM(s) Oral at bedtime PRN  mycophenolate mofetil 1000 milliGRAM(s) Oral two times a day  ondansetron Injectable 4 milliGRAM(s) IV Push every 8 hours PRN  oxyCODONE    IR 5 milliGRAM(s) Oral every 6 hours PRN  pantoprazole    Tablet 40 milliGRAM(s) Oral before breakfast  predniSONE   Tablet 5 milliGRAM(s) Oral daily  sodium chloride 0.9%. 1000 milliLiter(s) IV Continuous <Continuous>      Vital Signs Last 24 Hrs  T(C): 36.4 (14 Sep 2021 05:19), Max: 36.7 (13 Sep 2021 10:36)  T(F): 97.6 (14 Sep 2021 05:19), Max: 98.1 (13 Sep 2021 10:36)  HR: 88 (14 Sep 2021 05:19) (85 - 100)  BP: 118/71 (14 Sep 2021 05:19) (104/61 - 118/71)  BP(mean): --  RR: 16 (14 Sep 2021 05:19) (14 - 18)  SpO2: 99% (14 Sep 2021 05:19) (95% - 99%)                          9.7    7.81  )-----------( 277      ( 13 Sep 2021 06:20 )             26.4       09-13    139  |  105  |  8   ----------------------------<  95  3.6   |  22  |  0.57    Ca    9.4      13 Sep 2021 06:20  Phos  3.5     09-13  Mg     1.90     09-13                PT/INR - ( 14 Sep 2021 07:14 )   PT: 18.6 sec;   INR: 1.66 ratio         PTT - ( 14 Sep 2021 07:14 )  PTT:32.0 sec

## 2021-09-15 LAB
ANION GAP SERPL CALC-SCNC: 13 MMOL/L — SIGNIFICANT CHANGE UP (ref 7–14)
APTT BLD: 32.9 SEC — SIGNIFICANT CHANGE UP (ref 27–36.3)
BUN SERPL-MCNC: 6 MG/DL — LOW (ref 7–23)
CALCIUM SERPL-MCNC: 9.4 MG/DL — SIGNIFICANT CHANGE UP (ref 8.4–10.5)
CHLORIDE SERPL-SCNC: 105 MMOL/L — SIGNIFICANT CHANGE UP (ref 98–107)
CO2 SERPL-SCNC: 23 MMOL/L — SIGNIFICANT CHANGE UP (ref 22–31)
CREAT SERPL-MCNC: 0.55 MG/DL — SIGNIFICANT CHANGE UP (ref 0.5–1.3)
GLUCOSE SERPL-MCNC: 98 MG/DL — SIGNIFICANT CHANGE UP (ref 70–99)
HCT VFR BLD CALC: 26 % — LOW (ref 34.5–45)
HGB BLD-MCNC: 9.5 G/DL — LOW (ref 11.5–15.5)
INR BLD: 1.66 RATIO — HIGH (ref 0.88–1.16)
MAGNESIUM SERPL-MCNC: 1.9 MG/DL — SIGNIFICANT CHANGE UP (ref 1.6–2.6)
MCHC RBC-ENTMCNC: 34.7 PG — HIGH (ref 27–34)
MCHC RBC-ENTMCNC: 36.5 GM/DL — HIGH (ref 32–36)
MCV RBC AUTO: 94.9 FL — SIGNIFICANT CHANGE UP (ref 80–100)
NRBC # BLD: 6 /100 WBCS — SIGNIFICANT CHANGE UP
NRBC # FLD: 0.51 K/UL — HIGH
PHOSPHATE SERPL-MCNC: 3.6 MG/DL — SIGNIFICANT CHANGE UP (ref 2.5–4.5)
PLATELET # BLD AUTO: 319 K/UL — SIGNIFICANT CHANGE UP (ref 150–400)
POTASSIUM SERPL-MCNC: 3.8 MMOL/L — SIGNIFICANT CHANGE UP (ref 3.5–5.3)
POTASSIUM SERPL-SCNC: 3.8 MMOL/L — SIGNIFICANT CHANGE UP (ref 3.5–5.3)
PROTHROM AB SERPL-ACNC: 18.6 SEC — HIGH (ref 10.6–13.6)
RBC # BLD: 2.74 M/UL — LOW (ref 3.8–5.2)
RBC # FLD: 14.2 % — SIGNIFICANT CHANGE UP (ref 10.3–14.5)
SODIUM SERPL-SCNC: 141 MMOL/L — SIGNIFICANT CHANGE UP (ref 135–145)
WBC # BLD: 8.32 K/UL — SIGNIFICANT CHANGE UP (ref 3.8–10.5)
WBC # FLD AUTO: 8.32 K/UL — SIGNIFICANT CHANGE UP (ref 3.8–10.5)

## 2021-09-15 RX ORDER — WARFARIN SODIUM 2.5 MG/1
7.5 TABLET ORAL ONCE
Refills: 0 | Status: COMPLETED | OUTPATIENT
Start: 2021-09-15 | End: 2021-09-15

## 2021-09-15 RX ADMIN — CYCLOBENZAPRINE HYDROCHLORIDE 5 MILLIGRAM(S): 10 TABLET, FILM COATED ORAL at 13:23

## 2021-09-15 RX ADMIN — OXYCODONE HYDROCHLORIDE 5 MILLIGRAM(S): 5 TABLET ORAL at 06:52

## 2021-09-15 RX ADMIN — Medication 5 MILLIGRAM(S): at 06:21

## 2021-09-15 RX ADMIN — SODIUM CHLORIDE 75 MILLILITER(S): 9 INJECTION INTRAMUSCULAR; INTRAVENOUS; SUBCUTANEOUS at 13:24

## 2021-09-15 RX ADMIN — GABAPENTIN 200 MILLIGRAM(S): 400 CAPSULE ORAL at 17:18

## 2021-09-15 RX ADMIN — SODIUM CHLORIDE 75 MILLILITER(S): 9 INJECTION INTRAMUSCULAR; INTRAVENOUS; SUBCUTANEOUS at 20:58

## 2021-09-15 RX ADMIN — PANTOPRAZOLE SODIUM 40 MILLIGRAM(S): 20 TABLET, DELAYED RELEASE ORAL at 06:20

## 2021-09-15 RX ADMIN — GABAPENTIN 200 MILLIGRAM(S): 400 CAPSULE ORAL at 06:19

## 2021-09-15 RX ADMIN — MYCOPHENOLATE MOFETIL 1000 MILLIGRAM(S): 250 CAPSULE ORAL at 06:19

## 2021-09-15 RX ADMIN — OXYCODONE HYDROCHLORIDE 5 MILLIGRAM(S): 5 TABLET ORAL at 05:52

## 2021-09-15 RX ADMIN — Medication 1 MILLIGRAM(S): at 13:23

## 2021-09-15 RX ADMIN — WARFARIN SODIUM 7.5 MILLIGRAM(S): 2.5 TABLET ORAL at 17:18

## 2021-09-15 RX ADMIN — MYCOPHENOLATE MOFETIL 1000 MILLIGRAM(S): 250 CAPSULE ORAL at 17:18

## 2021-09-15 NOTE — CHART NOTE - NSCHARTNOTEFT_GEN_A_CORE
Notified by RN of abnormal lab values which are not within patient normal values, stat repeat labs ordered, will follow up results
Patient complaining of reoccurring back pain today . Was given toradol yesterday with significant improvement .   No weakness , no loss of bladder or bowel continence , decreased range of motion in both legs due to significant pain x 1 week . Pain exacerbated with movement of lower extremities . Denies hx of back problems , injury prior to start of pain last week .    INR also noted to be subtherapeudic . As per patient she had an INR of 6 last wednesday . Coumadin held at that time x2 days . Last dose was Saturday . On Coumadin for hx of PE ( 2009, 2013) . Was previously taking eliquis but changed to coumadin in August to insurance no longer covering medication .   vital signs stable at present     On exam : b/l LE pulses present, strength equal and intact b/l . Decrease ROM b/l due to pain . Pain on palpation in b/l lower lumbar/ gluteal region. No pain in sacrum .   no swelling noted     - willl give toradol x 1 now   - CT L spine , CT abdomen done on admission   - consider MR / Neuro evaluation if no improvement   - will dose coumadin and start heparin drip for subtherapeudic INR . monitor CBC closely , stool for occult ordered  - continue lidoderm patches for now , tylenol   - above d/w Dr. Zepeda
Assessed patient - Reporting some SOB and chest heaviness - Placed on 1/2NS IVF and ordered 1U pRBC as per heme. ORdered for CXR at bedside and added trops, retic, LDH, diff, LFTs to AM labs  Patient stable and saturating 95% on RA
Patient with history of PE on coumadin. INR has been subtherapeutic. Discussed with Dr. Zepeda who does not recommend a heparin bridge at this time.

## 2021-09-15 NOTE — PROGRESS NOTE ADULT - PROBLEM SELECTOR PLAN 2
Improved
New  Urine specific gravity >1.050   IVF
Improved

## 2021-09-15 NOTE — PROGRESS NOTE ADULT - PROBLEM SELECTOR PLAN 6
INR is therapeutic
Chronic stable  Patient took her warfarin today  INR ordered  Confirm dosing
FU INR on warfarin
FU INR on warfarin
INR is subtherapeutic, 7.5 mg ordered for tonight
FU INR
INR is therapeutic

## 2021-09-15 NOTE — PROGRESS NOTE ADULT - SUBJECTIVE AND OBJECTIVE BOX
Pt has been feeling pain again today, IV blew up and she feels that lack fluids precipitates pain. A detailed ROS is otherwise unremarkable.         Meds:  acetaminophen   Tablet .. 650 milliGRAM(s) Oral every 6 hours PRN  aluminum hydroxide/magnesium hydroxide/simethicone Suspension 30 milliLiter(s) Oral every 4 hours PRN  cyclobenzaprine 5 milliGRAM(s) Oral every 8 hours PRN  folic acid 1 milliGRAM(s) Oral daily  gabapentin 200 milliGRAM(s) Oral every 12 hours  lidocaine   4% Patch 1 Patch Transdermal daily  melatonin 3 milliGRAM(s) Oral at bedtime PRN  mycophenolate mofetil 1000 milliGRAM(s) Oral two times a day  ondansetron Injectable 4 milliGRAM(s) IV Push every 8 hours PRN  oxyCODONE    IR 5 milliGRAM(s) Oral every 6 hours PRN  pantoprazole    Tablet 40 milliGRAM(s) Oral before breakfast  predniSONE   Tablet 5 milliGRAM(s) Oral daily  sodium chloride 0.9%. 1000 milliLiter(s) IV Continuous <Continuous>      Vital Signs Last 24 Hrs  T(C): 37 (15 Sep 2021 17:41), Max: 37.1 (14 Sep 2021 21:12)  T(F): 98.6 (15 Sep 2021 17:41), Max: 98.8 (15 Sep 2021 09:13)  HR: 89 (15 Sep 2021 17:41) (80 - 89)  BP: 108/58 (15 Sep 2021 17:41) (104/60 - 119/62)  BP(mean): --  RR: 18 (15 Sep 2021 17:41) (17 - 19)  SpO2: 97% (15 Sep 2021 17:41) (95% - 99%)                          9.5    8.32  )-----------( 319      ( 15 Sep 2021 06:38 )             26.0       09-15    141  |  105  |  6<L>  ----------------------------<  98  3.8   |  23  |  0.55    Ca    9.4      15 Sep 2021 06:38  Phos  3.6     09-15  Mg     1.90     09-15                PT/INR - ( 15 Sep 2021 06:38 )   PT: 18.6 sec;   INR: 1.66 ratio         PTT - ( 15 Sep 2021 06:38 )  PTT:32.9 sec

## 2021-09-15 NOTE — PROGRESS NOTE ADULT - SUBJECTIVE AND OBJECTIVE BOX
Patient is a 51y old  Female who presents with a chief complaint of back pain/poor po intake (05 Sep 2021 21:41)      9/15/21    Feels Improved from earlier  Afebrile        PAST MEDICAL & SURGICAL HISTORY:  Lupus    Sickle cell disease    No significant past surgical history        Review of Systems:   CONSTITUTIONAL: No fever, weight loss, or fatigue  EYES: No eye pain, visual disturbances, or discharge  ENMT:  No difficulty hearing, tinnitus, vertigo; No sinus or throat pain  NECK: No pain or stiffness  BREASTS: No pain, masses, or nipple discharge  RESPIRATORY: No cough, wheezing, chills or hemoptysis; No shortness of breath  CARDIOVASCULAR: No chest pain, palpitations, dizziness, or leg swelling  GASTROINTESTINAL: No abdominal or epigastric pain. No nausea, vomiting, or hematemesis; No diarrhea or constipation. No melena or hematochezia.  GENITOURINARY: No dysuria, frequency, hematuria, or incontinence  NEUROLOGICAL: No headaches, memory loss, loss of strength, numbness, or tremors  SKIN: No itching, burning, rashes, or lesions   LYMPH NODES: No enlarged glands  ENDOCRINE: No heat or cold intolerance; No hair loss  MUSCULOSKELETAL: gen pain  PSYCHIATRIC: No depression, anxiety, mood swings, or difficulty sleeping  HEME/LYMPH: No easy bruising, or bleeding gums  ALLERY AND IMMUNOLOGIC: No hives or eczema    Allergies    No Known Allergies    Intolerances        Social History:     FAMILY HISTORY:  No pertinent family history in first degree relatives        MEDICATIONS  (STANDING):  heparin  Infusion.  Unit(s)/Hr (11 mL/Hr) IV Continuous <Continuous>  lidocaine   4% Patch 1 Patch Transdermal daily  mycophenolate mofetil 1000 milliGRAM(s) Oral two times a day  predniSONE   Tablet 5 milliGRAM(s) Oral daily  sodium chloride 0.9%. 1000 milliLiter(s) (100 mL/Hr) IV Continuous <Continuous>    MEDICATIONS  (PRN):  acetaminophen   Tablet .. 650 milliGRAM(s) Oral every 6 hours PRN Temp greater or equal to 38.5C (101.3F), Mild Pain (1 - 3)  aluminum hydroxide/magnesium hydroxide/simethicone Suspension 30 milliLiter(s) Oral every 4 hours PRN Dyspepsia  heparin   Injectable 5000 Unit(s) IV Push every 6 hours PRN For aPTT less than 40  heparin   Injectable 2500 Unit(s) IV Push every 6 hours PRN For aPTT between 40 - 57  melatonin 3 milliGRAM(s) Oral at bedtime PRN Insomnia  ondansetron Injectable 4 milliGRAM(s) IV Push every 8 hours PRN Nausea and/or Vomiting        CAPILLARY BLOOD GLUCOSE        I&O's Summary    06 Sep 2021 07:01  -  06 Sep 2021 23:45  --------------------------------------------------------  IN: 1266 mL / OUT: 250 mL / NET: 1016 mL        PHYSICAL EXAM:  Vital Signs Last 24 Hrs  T(C): 37.1 (06 Sep 2021 21:59), Max: 37.1 (06 Sep 2021 17:14)  T(F): 98.8 (06 Sep 2021 21:59), Max: 98.8 (06 Sep 2021 21:59)  HR: 93 (06 Sep 2021 21:59) (78 - 93)  BP: 112/61 (06 Sep 2021 21:59) (103/64 - 118/65)  BP(mean): --  RR: 18 (06 Sep 2021 21:59) (18 - 18)  SpO2: 97% (06 Sep 2021 21:59) (96% - 98%)    GENERAL: NAD, well-developed  HEAD:  Atraumatic, Normocephalic  EYES: EOMI, PERRLA, conjunctiva and sclera clear  NECK: Supple, No JVD  CHEST/LUNG: Clear to auscultation bilaterally; No wheeze  HEART: Regular rate and rhythm; No murmurs, rubs, or gallops  ABDOMEN: Soft, Nontender, Nondistended; Bowel sounds present  EXTREMITIES:  2+ Peripheral Pulses, No clubbing, cyanosis, or edema  PSYCH: AAOx3  NEUROLOGY: non-focal  SKIN: No rashes or lesions    LABS:                        7.2    11.00 )-----------( 249      ( 06 Sep 2021 20:22 )             21.5     09-06    140  |  104  |  12  ----------------------------<  100<H>  3.5   |  23  |  0.76    Ca    9.5      06 Sep 2021 07:10    TPro  7.2  /  Alb  4.1  /  TBili  1.6<H>  /  DBili  x   /  AST  16  /  ALT  16  /  AlkPhos  150<H>  09-06    PT/INR - ( 06 Sep 2021 07:10 )   PT: 14.3 sec;   INR: 1.27 ratio         PTT - ( 06 Sep 2021 20:22 )  PTT:151.5 sec      Urinalysis Basic - ( 05 Sep 2021 19:00 )    Color: Yellow / Appearance: Clear / SG: >1.050 / pH: x  Gluc: x / Ketone: Trace  / Bili: Negative / Urobili: 3 mg/dL   Blood: x / Protein: Trace / Nitrite: Negative   Leuk Esterase: Negative / RBC: 1 /HPF / WBC 3 /HPF   Sq Epi: x / Non Sq Epi: 0 /HPF / Bacteria: Negative        RADIOLOGY & ADDITIONAL TESTS:    Imaging Personally Reviewed:    Consultant(s) Notes Reviewed:      Care Discussed with Consultants/Other Providers:

## 2021-09-15 NOTE — DIETITIAN INITIAL EVALUATION ADULT. - OTHER INFO
Pt has a history of Lupus, SCD and PE. Pt presented with constant and worsening lower back pain and decrease po intake.   Pt states that her appetite has improved but is it not back to normal. She estimates eating 60 to 70% of her meals. This has been since July 2021. Pt's weight in July was 76.2 kg/167.6 lbs and her admission weight was 61.5 kg/135.3 lbs. She has had a 14.7 kg/32.3 lb weight loss in2 months.  Pt amenable taking to Ensure Plus 2x/day.

## 2021-09-15 NOTE — PROGRESS NOTE ADULT - PROVIDER SPECIALTY LIST ADULT
Heme/Onc
Internal Medicine
Rheumatology
Internal Medicine
Heme/Onc
Heme/Onc

## 2021-09-15 NOTE — PROGRESS NOTE ADULT - PROBLEM SELECTOR PROBLEM 2
Decreased oral intake

## 2021-09-15 NOTE — PROGRESS NOTE ADULT - PROBLEM SELECTOR PROBLEM 5
Sickle cell disease

## 2021-09-15 NOTE — PROGRESS NOTE ADULT - REASON FOR ADMISSION
back pain/poor po intake

## 2021-09-15 NOTE — PROGRESS NOTE ADULT - PROBLEM SELECTOR PROBLEM 4
Systemic lupus

## 2021-09-15 NOTE — PROGRESS NOTE ADULT - PROBLEM SELECTOR PLAN 4
Rheum to FU
Rheum to FU, she is reporting a facial rash coming back today
Rheum to FU
Rheum to evaluate  Labs ordered for YAJAIRA, CRP, DS DsDNA, Complement levels
Rheum to FU
Rheum to FU, she is reporting a facial rash coming back today
Chronic stable  Continue cellcept and prednisone 5mg daily following confirmation of dosing
Rheum to FU, she is reporting a facial rash coming back
Rheum to FU

## 2021-09-15 NOTE — DIETITIAN INITIAL EVALUATION ADULT. - PROBLEM/PLAN-6
DISPLAY PLAN FREE TEXT Oxybutynin Counseling:  I discussed with the patient the risks of oxybutynin including but not limited to skin rash, drowsiness, dry mouth, difficulty urinating, and blurred vision.

## 2021-09-15 NOTE — PROGRESS NOTE ADULT - PROBLEM SELECTOR PLAN 1
MRI of L spine unremarkable  Pain management eval noted  Source of pain is SC disease
Pain management eval noted  Source of pain is SC disease  Cont IV fluids, Needs opioid pain meds
MRI of L spine ordered. Pain management eval requested.  Reason for pain is unclear, Rheum to evaluate for Lupus flare
MRI of L spine unremarkable  Pain management eval requested.  Source of pain is SC disease
MRI of L spine unremarkable  Pain management eval noted  Source of pain is SC disease
Pain management eval noted  Source of pain is SC disease  Stop IV Fluids  Needs opioid pain meds. try to wean as per her pain
New  1 week history of back pain  CT imaging negative  PT exercises as outpatient   MRI of L Spine  PT eval
Pain management eval noted  Source of pain is SC disease  Cont IV fluids, Needs opioid pain meds. try to wean as per her pain
MRI of L spine unremarkable  Pain management eval noted  Source of pain is SC disease

## 2021-09-15 NOTE — PROGRESS NOTE ADULT - NEGATIVE GENERAL SYMPTOMS
no fever/no chills/no anorexia
no fever/no chills/no anorexia/no fatigue
no fever/no chills/no fatigue

## 2021-09-15 NOTE — PROGRESS NOTE ADULT - PROBLEM SELECTOR PROBLEM 3
Transaminitis

## 2021-09-15 NOTE — PROGRESS NOTE ADULT - PROBLEM SELECTOR PLAN 3
FU LFTs
FU LFTs
New  No record to compare to patient reports chronically elevated  s/p cholecystectomy  Tbili 2.4, , AST 64, ALT 24  Will repeat to assure not trending up
FU LFTs

## 2021-09-15 NOTE — PROGRESS NOTE ADULT - ASSESSMENT
51 yr old female presenting with lower back pain of 1 week duration and decreased PO intake
51 yr old female presenting with lower back pain of 1 week duration and decreased PO intake
Patient is a 51F with PMHx APLS (on coumadin), undifferentiated CTD, HbSC disease presenting for acute low back pain, decreased PO x1 week. CT L-spine, A/P negative. Found to have hemolytic anemia. Rheumatology consulted for r/o flare    #Low Back Pain: Patient with low back pain x1 week found to have hemolytic anemia. Patient with ROS also positive for weight loss, alopecia photosensitive rash, xerophthalmia, joint stiffness. Negative CT scan and MRI L spine r/o mechanical low back pain. Labs significant for hemolytic anemia at this time but YAJAIRA, LYLE, complements, SSA, SSB, direct shi, LAC, UA, RF negative. Pertinent positive results include Hb electrophoresis confirming Hb SC disease. Most likely pain crisis from Hb SC disease  -treatment per primary team and Heme/onc  -pain control per pain management   -will f/u remaining labs dsDNA, complements, APLS labs, SPEP, UPEP    #Undifferntiated CTD:  In 2012, patient had lethargy, alopecia, arthritis with Jacoud changes, arthropathy b/l hands, weight loss was dx with undifferentiated CTD, patient's symptoms responded to Plaquenil. In 2018, CellCept was started. Baseline Hb 10-11. Negative labs include YAJAIRA, LYLE, dsDNA, SSA, SSB, Complements, ANCA, CCP, RF, anti-histone, B2GP, ACL, LAC from [facility] chart review [id number]. Immunoglobulins 2180 and IgG4 142 in 2016 with repeat IgG4 . SLE cannot be diagnosed in the setting of negative YAJAIRA  -c/w home prednisone and cellcept  -w/u as described above    #APLS: Patient was diagnosed in 1992 s.p stroke, was started on coumadin. In 2001 patient saw a hematologist, whoc recommended d/cing coumadin given antibodies negative and had vitreous hemorrhage. Patient was clinically ok until 2009 when she was diagnosed with b/l PE and coumadin was restarted. 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 It is also documented that she failed coumadin and was started on pradaxa, however patient reports currently being on coumadin. INR subtherapeutic on admission, c/f medication nonadherence?   -c/w home meds  -w/u as described above    Case discussed with Dr. Isaiah Vieira D.O.  Rheumatology Fellow PGY 4  Pager [phone]
51 yr old female presenting with lower back pain of 1 week duration and decreased PO intake
51y old  Female who presents with a chief complaint of back pain/poor po intake (09 Sep 2021 16:58), had gall bladder surgery in July and since then, she has been having pain, oxycodone helps, decreasing hgb while on a/c, will recommend:    - continue Rx as per medicine, rheumatology  - pain control - on oxycodone  - IVF - 1/2 NS at 125 cc/h, encouraged the pt to drink ~ gallon of fluids with lytes  - incentive spirometry every hour - doing some, told her to do it every hour  - no current evidence of AIHA  - on a/c for APS and h/o recurrent clots - monitor for any bleeding, hgb, plts  - No evidence of def of iron, B12, folate or clinical bleeding  - f/u SPEP, SIFx and UPEP  - Hgb is better since the transfusion  - folic acid 1 mg daily  - all other supportive Rx  - will f/u periodically  - to f/u with prior hematologist after discharge    for questions, please call [phone]    
51y old  Female who presents with a chief complaint of back pain/poor po intake (09 Sep 2021 16:58), had gall bladder surgery in July and since then, she has been having pain, oxycodone helps, decreasing hgb while on a/c, will recommend:    - continue Rx as per medicine, rheumatology  - pain control - on oxycodone and helping her but she thinks IVF also helping her, pain medicine f/u  - IVF - 1/2 NS at 125 cc/h, encouraged the pt to drink ~ gallon of fluids with lytes  - incentive spirometry every hour - doing some, told her to do it every hour  - no current evidence of AIHA  - on a/c for APS and h/o recurrent clots - monitor for any bleeding, hgb, plts  - No evidence of def of iron, B12, folate or clinical bleeding  - Hgb is better since the transfusion  - folic acid 1 mg daily  - all other supportive Rx  - will f/u periodically  - to f/u with prior hematologist after discharge    for questions, please call [phone]  
51y old  Female who presents with a chief complaint of back pain/poor po intake (09 Sep 2021 16:58), had gall bladder surgery in July and since then, she has been having pain, oxycodone helps, decreasing hgb while on a/c, will recommend:    - continue Rx as per medicine, rheumatology  - pain control - on oxycodone  - IVF - 1/2 NS at 125 cc/h  - incentive spirometry every hour  - no current evidence of AIHA  - on IV heparin for APS and h/o recurrent clots - monitor for any bleeding, hgb, plts  - No evidence of def of iron, B12, folate or clinical bleeding  - f/u SPEP, SIFx and UPEP  - Hgb is better since the transfusion  - folic acid 1 mg daily  - all other supportive Rx  - will f/u periodically    for questions, please call [phone]

## 2021-09-16 VITALS
SYSTOLIC BLOOD PRESSURE: 113 MMHG | DIASTOLIC BLOOD PRESSURE: 61 MMHG | TEMPERATURE: 99 F | OXYGEN SATURATION: 100 % | HEART RATE: 95 BPM | RESPIRATION RATE: 18 BRPM

## 2021-09-16 LAB
ANION GAP SERPL CALC-SCNC: 14 MMOL/L — SIGNIFICANT CHANGE UP (ref 7–14)
BUN SERPL-MCNC: 4 MG/DL — LOW (ref 7–23)
CALCIUM SERPL-MCNC: 9.3 MG/DL — SIGNIFICANT CHANGE UP (ref 8.4–10.5)
CHLORIDE SERPL-SCNC: 104 MMOL/L — SIGNIFICANT CHANGE UP (ref 98–107)
CO2 SERPL-SCNC: 24 MMOL/L — SIGNIFICANT CHANGE UP (ref 22–31)
CREAT SERPL-MCNC: 0.55 MG/DL — SIGNIFICANT CHANGE UP (ref 0.5–1.3)
GLUCOSE SERPL-MCNC: 100 MG/DL — HIGH (ref 70–99)
HCT VFR BLD CALC: 25.5 % — LOW (ref 34.5–45)
HGB BLD-MCNC: 9.3 G/DL — LOW (ref 11.5–15.5)
INR BLD: 2.02 RATIO — HIGH (ref 0.88–1.16)
INTERPRETATION SERPL IFE-IMP: SIGNIFICANT CHANGE UP
MAGNESIUM SERPL-MCNC: 1.9 MG/DL — SIGNIFICANT CHANGE UP (ref 1.6–2.6)
MCHC RBC-ENTMCNC: 35.1 PG — HIGH (ref 27–34)
MCHC RBC-ENTMCNC: 36.5 GM/DL — HIGH (ref 32–36)
MCV RBC AUTO: 96.2 FL — SIGNIFICANT CHANGE UP (ref 80–100)
NRBC # BLD: 6 /100 WBCS — SIGNIFICANT CHANGE UP
NRBC # FLD: 0.5 K/UL — HIGH
PHOSPHATE SERPL-MCNC: 3.5 MG/DL — SIGNIFICANT CHANGE UP (ref 2.5–4.5)
PLATELET # BLD AUTO: 319 K/UL — SIGNIFICANT CHANGE UP (ref 150–400)
POTASSIUM SERPL-MCNC: 3.7 MMOL/L — SIGNIFICANT CHANGE UP (ref 3.5–5.3)
POTASSIUM SERPL-SCNC: 3.7 MMOL/L — SIGNIFICANT CHANGE UP (ref 3.5–5.3)
PROTHROM AB SERPL-ACNC: 22.3 SEC — HIGH (ref 10.6–13.6)
RBC # BLD: 2.65 M/UL — LOW (ref 3.8–5.2)
RBC # FLD: 14.3 % — SIGNIFICANT CHANGE UP (ref 10.3–14.5)
SODIUM SERPL-SCNC: 142 MMOL/L — SIGNIFICANT CHANGE UP (ref 135–145)
WBC # BLD: 7.86 K/UL — SIGNIFICANT CHANGE UP (ref 3.8–10.5)
WBC # FLD AUTO: 7.86 K/UL — SIGNIFICANT CHANGE UP (ref 3.8–10.5)

## 2021-09-16 RX ORDER — INFLUENZA VIRUS VACCINE 15; 15; 15; 15 UG/.5ML; UG/.5ML; UG/.5ML; UG/.5ML
0.5 SUSPENSION INTRAMUSCULAR ONCE
Refills: 0 | Status: COMPLETED | OUTPATIENT
Start: 2021-09-16 | End: 2021-09-16

## 2021-09-16 RX ORDER — OXYCODONE HYDROCHLORIDE 5 MG/1
5 TABLET ORAL EVERY 6 HOURS
Refills: 0 | Status: DISCONTINUED | OUTPATIENT
Start: 2021-09-16 | End: 2021-09-16

## 2021-09-16 RX ORDER — PANTOPRAZOLE SODIUM 20 MG/1
1 TABLET, DELAYED RELEASE ORAL
Qty: 30 | Refills: 0
Start: 2021-09-16 | End: 2021-10-15

## 2021-09-16 RX ORDER — FOLIC ACID 0.8 MG
1 TABLET ORAL
Qty: 30 | Refills: 0
Start: 2021-09-16 | End: 2021-10-15

## 2021-09-16 RX ORDER — GABAPENTIN 400 MG/1
2 CAPSULE ORAL
Qty: 120 | Refills: 0
Start: 2021-09-16 | End: 2021-10-15

## 2021-09-16 RX ADMIN — Medication 1 MILLIGRAM(S): at 12:15

## 2021-09-16 RX ADMIN — MYCOPHENOLATE MOFETIL 1000 MILLIGRAM(S): 250 CAPSULE ORAL at 07:30

## 2021-09-16 RX ADMIN — Medication 650 MILLIGRAM(S): at 06:25

## 2021-09-16 RX ADMIN — Medication 5 MILLIGRAM(S): at 07:31

## 2021-09-16 RX ADMIN — Medication 650 MILLIGRAM(S): at 07:25

## 2021-09-16 RX ADMIN — PANTOPRAZOLE SODIUM 40 MILLIGRAM(S): 20 TABLET, DELAYED RELEASE ORAL at 07:30

## 2021-09-16 RX ADMIN — CYCLOBENZAPRINE HYDROCHLORIDE 5 MILLIGRAM(S): 10 TABLET, FILM COATED ORAL at 02:17

## 2021-09-16 RX ADMIN — LIDOCAINE 1 PATCH: 4 CREAM TOPICAL at 12:15

## 2021-09-16 RX ADMIN — GABAPENTIN 200 MILLIGRAM(S): 400 CAPSULE ORAL at 07:31

## 2021-09-16 RX ADMIN — INFLUENZA VIRUS VACCINE 0.5 MILLILITER(S): 15; 15; 15; 15 SUSPENSION INTRAMUSCULAR at 16:14

## 2021-10-05 ENCOUNTER — APPOINTMENT (OUTPATIENT)
Dept: INTERNAL MEDICINE | Facility: CLINIC | Age: 51
End: 2021-10-05

## 2023-06-27 ENCOUNTER — INPATIENT (INPATIENT)
Facility: HOSPITAL | Age: 53
LOS: 13 days | Discharge: HOME HEALTH | DRG: 314 | End: 2023-07-11
Attending: INTERNAL MEDICINE | Admitting: STUDENT IN AN ORGANIZED HEALTH CARE EDUCATION/TRAINING PROGRAM
Payer: MEDICARE

## 2023-06-27 VITALS
OXYGEN SATURATION: 98 % | RESPIRATION RATE: 16 BRPM | SYSTOLIC BLOOD PRESSURE: 123 MMHG | WEIGHT: 126.1 LBS | HEIGHT: 65 IN | DIASTOLIC BLOOD PRESSURE: 63 MMHG | HEART RATE: 97 BPM | TEMPERATURE: 99 F

## 2023-06-27 DIAGNOSIS — Z79.01 LONG TERM (CURRENT) USE OF ANTICOAGULANTS: ICD-10-CM

## 2023-06-27 DIAGNOSIS — R09.89 OTHER SPECIFIED SYMPTOMS AND SIGNS INVOLVING THE CIRCULATORY AND RESPIRATORY SYSTEMS: ICD-10-CM

## 2023-06-27 DIAGNOSIS — D68.61 ANTIPHOSPHOLIPID SYNDROME: ICD-10-CM

## 2023-06-27 DIAGNOSIS — Z79.620 LONG TERM (CURRENT) USE OF IMMUNOSUPPRESSIVE BIOLOGIC: ICD-10-CM

## 2023-06-27 DIAGNOSIS — Z90.49 ACQUIRED ABSENCE OF OTHER SPECIFIED PARTS OF DIGESTIVE TRACT: Chronic | ICD-10-CM

## 2023-06-27 DIAGNOSIS — I69.354 HEMIPLEGIA AND HEMIPARESIS FOLLOWING CEREBRAL INFARCTION AFFECTING LEFT NON-DOMINANT SIDE: ICD-10-CM

## 2023-06-27 DIAGNOSIS — E83.42 HYPOMAGNESEMIA: ICD-10-CM

## 2023-06-27 DIAGNOSIS — R11.2 NAUSEA WITH VOMITING, UNSPECIFIED: ICD-10-CM

## 2023-06-27 DIAGNOSIS — T39.395A ADVERSE EFFECT OF OTHER NONSTEROIDAL ANTI-INFLAMMATORY DRUGS [NSAID], INITIAL ENCOUNTER: ICD-10-CM

## 2023-06-27 DIAGNOSIS — R07.9 CHEST PAIN, UNSPECIFIED: ICD-10-CM

## 2023-06-27 DIAGNOSIS — U07.1 COVID-19: ICD-10-CM

## 2023-06-27 DIAGNOSIS — Z86.711 PERSONAL HISTORY OF PULMONARY EMBOLISM: ICD-10-CM

## 2023-06-27 DIAGNOSIS — I30.9 ACUTE PERICARDITIS, UNSPECIFIED: ICD-10-CM

## 2023-06-27 DIAGNOSIS — R07.89 OTHER CHEST PAIN: ICD-10-CM

## 2023-06-27 DIAGNOSIS — K59.03 DRUG INDUCED CONSTIPATION: ICD-10-CM

## 2023-06-27 DIAGNOSIS — M32.9 SYSTEMIC LUPUS ERYTHEMATOSUS, UNSPECIFIED: ICD-10-CM

## 2023-06-27 DIAGNOSIS — D57.20 SICKLE-CELL/HB-C DISEASE WITHOUT CRISIS: ICD-10-CM

## 2023-06-27 DIAGNOSIS — Z86.16 PERSONAL HISTORY OF COVID-19: ICD-10-CM

## 2023-06-27 PROBLEM — D57.1 SICKLE-CELL DISEASE WITHOUT CRISIS: Chronic | Status: ACTIVE | Noted: 2021-09-05

## 2023-06-27 LAB
ALBUMIN SERPL ELPH-MCNC: 4.1 G/DL — SIGNIFICANT CHANGE UP (ref 3.5–5.2)
ALP SERPL-CCNC: 90 U/L — SIGNIFICANT CHANGE UP (ref 30–115)
ALT FLD-CCNC: 17 U/L — SIGNIFICANT CHANGE UP (ref 0–41)
ANION GAP SERPL CALC-SCNC: 14 MMOL/L — SIGNIFICANT CHANGE UP (ref 7–14)
APPEARANCE UR: CLEAR — SIGNIFICANT CHANGE UP
APTT BLD: 34.3 SEC — SIGNIFICANT CHANGE UP (ref 27–39.2)
AST SERPL-CCNC: 15 U/L — SIGNIFICANT CHANGE UP (ref 0–41)
BACTERIA # UR AUTO: NEGATIVE — SIGNIFICANT CHANGE UP
BASE EXCESS BLDV CALC-SCNC: 0.3 MMOL/L — SIGNIFICANT CHANGE UP (ref -2–3)
BASOPHILS # BLD AUTO: 0.03 K/UL — SIGNIFICANT CHANGE UP (ref 0–0.2)
BASOPHILS NFR BLD AUTO: 0.3 % — SIGNIFICANT CHANGE UP (ref 0–1)
BILIRUB SERPL-MCNC: 0.7 MG/DL — SIGNIFICANT CHANGE UP (ref 0.2–1.2)
BILIRUB UR-MCNC: NEGATIVE — SIGNIFICANT CHANGE UP
BLD GP AB SCN SERPL QL: SIGNIFICANT CHANGE UP
BUN SERPL-MCNC: 12 MG/DL — SIGNIFICANT CHANGE UP (ref 10–20)
CA-I SERPL-SCNC: 1.3 MMOL/L — SIGNIFICANT CHANGE UP (ref 1.15–1.33)
CALCIUM SERPL-MCNC: 9.3 MG/DL — SIGNIFICANT CHANGE UP (ref 8.4–10.5)
CHLORIDE SERPL-SCNC: 106 MMOL/L — SIGNIFICANT CHANGE UP (ref 98–110)
CO2 SERPL-SCNC: 23 MMOL/L — SIGNIFICANT CHANGE UP (ref 17–32)
COLOR SPEC: SIGNIFICANT CHANGE UP
CREAT SERPL-MCNC: 0.7 MG/DL — SIGNIFICANT CHANGE UP (ref 0.7–1.5)
CRP SERPL-MCNC: 39.3 MG/L — HIGH
D DIMER BLD IA.RAPID-MCNC: 377 NG/ML DDU — HIGH
DIFF PNL FLD: NEGATIVE — SIGNIFICANT CHANGE UP
EGFR: 103 ML/MIN/1.73M2 — SIGNIFICANT CHANGE UP
EOSINOPHIL # BLD AUTO: 0.05 K/UL — SIGNIFICANT CHANGE UP (ref 0–0.7)
EOSINOPHIL NFR BLD AUTO: 0.4 % — SIGNIFICANT CHANGE UP (ref 0–8)
EPI CELLS # UR: 1 /HPF — SIGNIFICANT CHANGE UP (ref 0–5)
ERYTHROCYTE [SEDIMENTATION RATE] IN BLOOD: 30 MM/HR — HIGH (ref 0–20)
GAS PNL BLDV: 139 MMOL/L — SIGNIFICANT CHANGE UP (ref 136–145)
GAS PNL BLDV: SIGNIFICANT CHANGE UP
GLUCOSE SERPL-MCNC: 136 MG/DL — HIGH (ref 70–99)
GLUCOSE UR QL: NEGATIVE — SIGNIFICANT CHANGE UP
HCG SERPL QL: NEGATIVE — SIGNIFICANT CHANGE UP
HCO3 BLDV-SCNC: 26 MMOL/L — SIGNIFICANT CHANGE UP (ref 22–29)
HCT VFR BLD CALC: 24.1 % — LOW (ref 37–47)
HCT VFR BLDA CALC: 28 % — LOW (ref 39–51)
HGB BLD CALC-MCNC: 9.2 G/DL — LOW (ref 12.6–17.4)
HGB BLD-MCNC: 8.9 G/DL — LOW (ref 12–16)
HYALINE CASTS # UR AUTO: 1 /LPF — SIGNIFICANT CHANGE UP (ref 0–7)
IMM GRANULOCYTES NFR BLD AUTO: 0.6 % — HIGH (ref 0.1–0.3)
INR BLD: 1.26 RATIO — SIGNIFICANT CHANGE UP (ref 0.65–1.3)
KETONES UR-MCNC: NEGATIVE — SIGNIFICANT CHANGE UP
LACTATE BLDV-MCNC: 1.5 MMOL/L — SIGNIFICANT CHANGE UP (ref 0.5–2)
LACTATE SERPL-SCNC: 1.6 MMOL/L — SIGNIFICANT CHANGE UP (ref 0.7–2)
LEUKOCYTE ESTERASE UR-ACNC: ABNORMAL
LIDOCAIN IGE QN: 46 U/L — SIGNIFICANT CHANGE UP (ref 7–60)
LYMPHOCYTES # BLD AUTO: 0.75 K/UL — LOW (ref 1.2–3.4)
LYMPHOCYTES # BLD AUTO: 6.5 % — LOW (ref 20.5–51.1)
MCHC RBC-ENTMCNC: 33.3 PG — HIGH (ref 27–31)
MCHC RBC-ENTMCNC: 36.9 G/DL — SIGNIFICANT CHANGE UP (ref 32–37)
MCV RBC AUTO: 90.3 FL — SIGNIFICANT CHANGE UP (ref 81–99)
MONOCYTES # BLD AUTO: 0.69 K/UL — HIGH (ref 0.1–0.6)
MONOCYTES NFR BLD AUTO: 6 % — SIGNIFICANT CHANGE UP (ref 1.7–9.3)
NEUTROPHILS # BLD AUTO: 9.96 K/UL — HIGH (ref 1.4–6.5)
NEUTROPHILS NFR BLD AUTO: 86.2 % — HIGH (ref 42.2–75.2)
NITRITE UR-MCNC: NEGATIVE — SIGNIFICANT CHANGE UP
NRBC # BLD: 2 /100 WBCS — HIGH (ref 0–0)
PCO2 BLDV: 49 MMHG — HIGH (ref 39–42)
PH BLDV: 7.34 — SIGNIFICANT CHANGE UP (ref 7.32–7.43)
PH UR: 6 — SIGNIFICANT CHANGE UP (ref 5–8)
PLATELET # BLD AUTO: 362 K/UL — SIGNIFICANT CHANGE UP (ref 130–400)
PMV BLD: 9.8 FL — SIGNIFICANT CHANGE UP (ref 7.4–10.4)
PO2 BLDV: 32 MMHG — SIGNIFICANT CHANGE UP
POTASSIUM BLDV-SCNC: 3.9 MMOL/L — SIGNIFICANT CHANGE UP (ref 3.5–5.1)
POTASSIUM SERPL-MCNC: 4.3 MMOL/L — SIGNIFICANT CHANGE UP (ref 3.5–5)
POTASSIUM SERPL-SCNC: 4.3 MMOL/L — SIGNIFICANT CHANGE UP (ref 3.5–5)
PROT SERPL-MCNC: 7 G/DL — SIGNIFICANT CHANGE UP (ref 6–8)
PROT UR-MCNC: SIGNIFICANT CHANGE UP
PROTHROM AB SERPL-ACNC: 14.5 SEC — HIGH (ref 9.95–12.87)
RBC # BLD: 2.67 M/UL — LOW (ref 4.2–5.4)
RBC # FLD: 14 % — SIGNIFICANT CHANGE UP (ref 11.5–14.5)
RBC CASTS # UR COMP ASSIST: 2 /HPF — SIGNIFICANT CHANGE UP (ref 0–4)
SAO2 % BLDV: 41 % — SIGNIFICANT CHANGE UP
SODIUM SERPL-SCNC: 143 MMOL/L — SIGNIFICANT CHANGE UP (ref 135–146)
SP GR SPEC: 1.02 — SIGNIFICANT CHANGE UP (ref 1.01–1.03)
TROPONIN T SERPL-MCNC: <0.01 NG/ML — SIGNIFICANT CHANGE UP
UROBILINOGEN FLD QL: SIGNIFICANT CHANGE UP
WBC # BLD: 11.55 K/UL — HIGH (ref 4.8–10.8)
WBC # FLD AUTO: 11.55 K/UL — HIGH (ref 4.8–10.8)
WBC UR QL: 8 /HPF — HIGH (ref 0–5)

## 2023-06-27 PROCEDURE — 84100 ASSAY OF PHOSPHORUS: CPT

## 2023-06-27 PROCEDURE — 82565 ASSAY OF CREATININE: CPT

## 2023-06-27 PROCEDURE — 85025 COMPLETE CBC W/AUTO DIFF WBC: CPT

## 2023-06-27 PROCEDURE — 99223 1ST HOSP IP/OBS HIGH 75: CPT

## 2023-06-27 PROCEDURE — 83020 HEMOGLOBIN ELECTROPHORESIS: CPT

## 2023-06-27 PROCEDURE — 97116 GAIT TRAINING THERAPY: CPT | Mod: GP

## 2023-06-27 PROCEDURE — 85652 RBC SED RATE AUTOMATED: CPT

## 2023-06-27 PROCEDURE — 36415 COLL VENOUS BLD VENIPUNCTURE: CPT

## 2023-06-27 PROCEDURE — 85027 COMPLETE CBC AUTOMATED: CPT

## 2023-06-27 PROCEDURE — 86038 ANTINUCLEAR ANTIBODIES: CPT

## 2023-06-27 PROCEDURE — 86160 COMPLEMENT ANTIGEN: CPT

## 2023-06-27 PROCEDURE — 99285 EMERGENCY DEPT VISIT HI MDM: CPT

## 2023-06-27 PROCEDURE — 86255 FLUORESCENT ANTIBODY SCREEN: CPT

## 2023-06-27 PROCEDURE — 87086 URINE CULTURE/COLONY COUNT: CPT

## 2023-06-27 PROCEDURE — 82787 IGG 1 2 3 OR 4 EACH: CPT

## 2023-06-27 PROCEDURE — 85730 THROMBOPLASTIN TIME PARTIAL: CPT

## 2023-06-27 PROCEDURE — 97530 THERAPEUTIC ACTIVITIES: CPT | Mod: GP

## 2023-06-27 PROCEDURE — 85613 RUSSELL VIPER VENOM DILUTED: CPT

## 2023-06-27 PROCEDURE — 71045 X-RAY EXAM CHEST 1 VIEW: CPT | Mod: 26

## 2023-06-27 PROCEDURE — 93010 ELECTROCARDIOGRAM REPORT: CPT

## 2023-06-27 PROCEDURE — 86225 DNA ANTIBODY NATIVE: CPT

## 2023-06-27 PROCEDURE — 86146 BETA-2 GLYCOPROTEIN ANTIBODY: CPT

## 2023-06-27 PROCEDURE — 93005 ELECTROCARDIOGRAM TRACING: CPT

## 2023-06-27 PROCEDURE — 84443 ASSAY THYROID STIM HORMONE: CPT

## 2023-06-27 PROCEDURE — 86147 CARDIOLIPIN ANTIBODY EA IG: CPT

## 2023-06-27 PROCEDURE — 86900 BLOOD TYPING SEROLOGIC ABO: CPT

## 2023-06-27 PROCEDURE — 71275 CT ANGIOGRAPHY CHEST: CPT | Mod: 26,MA

## 2023-06-27 PROCEDURE — 80048 BASIC METABOLIC PNL TOTAL CA: CPT

## 2023-06-27 PROCEDURE — 86140 C-REACTIVE PROTEIN: CPT

## 2023-06-27 PROCEDURE — 71045 X-RAY EXAM CHEST 1 VIEW: CPT

## 2023-06-27 PROCEDURE — 81025 URINE PREGNANCY TEST: CPT

## 2023-06-27 PROCEDURE — 86901 BLOOD TYPING SEROLOGIC RH(D): CPT

## 2023-06-27 PROCEDURE — 97162 PT EVAL MOD COMPLEX 30 MIN: CPT | Mod: GP

## 2023-06-27 PROCEDURE — 80053 COMPREHEN METABOLIC PANEL: CPT

## 2023-06-27 PROCEDURE — 83735 ASSAY OF MAGNESIUM: CPT

## 2023-06-27 PROCEDURE — 80180 DRUG SCRN QUAN MYCOPHENOLATE: CPT

## 2023-06-27 PROCEDURE — 93306 TTE W/DOPPLER COMPLETE: CPT

## 2023-06-27 PROCEDURE — 86235 NUCLEAR ANTIGEN ANTIBODY: CPT

## 2023-06-27 PROCEDURE — 74174 CTA ABD&PLVS W/CONTRAST: CPT

## 2023-06-27 PROCEDURE — 80061 LIPID PANEL: CPT

## 2023-06-27 PROCEDURE — 84484 ASSAY OF TROPONIN QUANT: CPT

## 2023-06-27 PROCEDURE — 86850 RBC ANTIBODY SCREEN: CPT

## 2023-06-27 PROCEDURE — 83036 HEMOGLOBIN GLYCOSYLATED A1C: CPT

## 2023-06-27 PROCEDURE — 85610 PROTHROMBIN TIME: CPT

## 2023-06-27 PROCEDURE — 80076 HEPATIC FUNCTION PANEL: CPT

## 2023-06-27 PROCEDURE — 93308 TTE F-UP OR LMTD: CPT | Mod: 26

## 2023-06-27 RX ORDER — ACETAMINOPHEN 500 MG
650 TABLET ORAL ONCE
Refills: 0 | Status: COMPLETED | OUTPATIENT
Start: 2023-06-27 | End: 2023-06-27

## 2023-06-27 RX ORDER — CEFTRIAXONE 500 MG/1
1000 INJECTION, POWDER, FOR SOLUTION INTRAMUSCULAR; INTRAVENOUS ONCE
Refills: 0 | Status: COMPLETED | OUTPATIENT
Start: 2023-06-27 | End: 2023-06-27

## 2023-06-27 RX ORDER — SODIUM CHLORIDE 9 MG/ML
1000 INJECTION INTRAMUSCULAR; INTRAVENOUS; SUBCUTANEOUS ONCE
Refills: 0 | Status: COMPLETED | OUTPATIENT
Start: 2023-06-27 | End: 2023-06-27

## 2023-06-27 RX ADMIN — Medication 650 MILLIGRAM(S): at 16:17

## 2023-06-27 RX ADMIN — SODIUM CHLORIDE 2000 MILLILITER(S): 9 INJECTION INTRAMUSCULAR; INTRAVENOUS; SUBCUTANEOUS at 16:23

## 2023-06-27 RX ADMIN — CEFTRIAXONE 100 MILLIGRAM(S): 500 INJECTION, POWDER, FOR SOLUTION INTRAMUSCULAR; INTRAVENOUS at 19:42

## 2023-06-27 NOTE — ED ADULT NURSE NOTE - NSFALLUNIVINTERV_ED_ALL_ED
Bed/Stretcher in lowest position, wheels locked, appropriate side rails in place/Call bell, personal items and telephone in reach/Instruct patient to call for assistance before getting out of bed/chair/stretcher/Non-slip footwear applied when patient is off stretcher/[place] to call system/Physically safe environment - no spills, clutter or unnecessary equipment/Purposeful proactive rounding/Room/bathroom lighting operational, light cord in reach

## 2023-06-27 NOTE — ED PROVIDER NOTE - NS ED ATTENDING STATEMENT MOD
This was a shared visit with the RHIANNON. I reviewed and verified the documentation and independently performed the documented:

## 2023-06-27 NOTE — H&P ADULT - NSHPPHYSICALEXAM_GEN_ALL_CORE
General: Awake , Alert oriented   Chest: GBAE no crackles, or wheezing   Heart : RRR , no murmur or rub   Abdomen: soft, non-tender non distended   Extremities: + peripheral pulses no edema   Neuro: General: Awake , Alert oriented   Chest: GBAE no crackles, or wheezing   Heart : RRR , no murmur or rub   Abdomen: soft, non-tender non distended   Extremities: + peripheral pulses no edema   Neuro: LLE 4/5 weakness, no weakness noted in LUE. R sided motor strength normal . Vital Signs Last 24 Hrs  T(C): 36.9 (28 Jun 2023 01:14), Max: 37.1 (27 Jun 2023 13:54)  T(F): 98.4 (28 Jun 2023 01:14), Max: 98.7 (27 Jun 2023 13:54)  HR: 93 (28 Jun 2023 01:14) (92 - 97)  BP: 108/64 (28 Jun 2023 01:14) (102/55 - 123/63)  BP(mean): 79 (28 Jun 2023 01:14) (79 - 79)  RR: 18 (28 Jun 2023 01:14) (16 - 18)  SpO2: 99% (28 Jun 2023 01:14) (97% - 99%)    Parameters below as of 28 Jun 2023 01:14  Patient On (Oxygen Delivery Method): nasal cannula  O2 Flow (L/min): 2        General: Awake , Alert oriented   Chest: GBAE no crackles, or wheezing   Heart : RRR , no murmur or rub   Abdomen: soft, non-tender non distended   Extremities: + peripheral pulses no edema   Neuro: LLE 4/5 weakness, no weakness noted in LUE. R sided motor strength normal .

## 2023-06-27 NOTE — ED PROVIDER NOTE - PHYSICAL EXAMINATION
CONSTITUTIONAL: awake, sitting in stretcher in no acute distress  SKIN: Warm, dry  HEAD: Normocephalic; atraumatic  EYES: No conjunctival injection. EOMI. PERRL  ENT: No nasal discharge; oropharynx nonerythematous; airway clear  NECK: Supple; non tender, no lymphadenopathy  CARD:  Regular rate and rhythm; S1, S2 normal; no murmurs, gallops, or rubs  RESP: CTAB; No wheezes, crackles, rales or rhonchi  ABD: Soft, nontender, nondistended, nonrigid, no guarding or rebound tenderness  EXT: Normal ROM,  no edema, good radial pulse  NEURO: A&O x3, speaking in full clear sentences, no facial asymmetry, moving all extremities

## 2023-06-27 NOTE — H&P ADULT - HISTORY OF PRESENT ILLNESS
51 yr old female with PMHx APLS (on coumadin), undifferentiated CTD, HbSC, hx of PE on warfarin presents stroke (w/ residual Left-sided weakness, on eliquis), presenting from rehab facility with chest pain, SOB, body aches, cough, headache since waking up 4 days ago. 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     Patient follow with Dr. Gutierrez, [phone], the rheumatologist. Patient is on 3000mg per day cellcept, and prednisone 5mg PO BID    Labs noticeable for WBC of 11.55, Hb 8.9, D-dimer 377, CRP 39, A mildly positive WBC 8   CT chest Angio showed Bibasilar dependent subsegmental areas of atelectasis and/or scarring.  Limited Echo in the ED showed: Small Pericardial Effusion, Decreased EF, RV dilation  EKG: NSR     VS insignificant,   Vital Signs Last 24 Hrs  T(C): 35.8 (27 Jun 2023 16:10), Max: 37.1 (27 Jun 2023 13:54)  T(F): 96.4 (27 Jun 2023 16:10), Max: 98.7 (27 Jun 2023 13:54)  HR: 92 (27 Jun 2023 16:10) (92 - 97)  BP: 102/55 (27 Jun 2023 16:10) (102/55 - 123/63)  RR: 16 (27 Jun 2023 16:10) (16 - 16)  SpO2: 97% (27 Jun 2023 16:10) (97% - 98%)    O2 Parameters below as of 27 Jun 2023 16:10  Patient On (Oxygen Delivery Method): nasal cannula  O2 Flow (L/min): 2       51 yr old female with PMHx APLS, hx ofr stroke in 1992 with residual left sided weakness (on eliquis 5 mg BID), undifferentiated CTD, HbSC, hx of PE (on eliquis) presents presenting from rehab facility with chest pain, SOB, cough, headache since Saturday. History goes back to 03/23 where the patient felt weak and had abdominal pain and was admitted to [facility] turned out to have low Hb of 6.3. Patient was then discharged to [place] rehab with no clear diagnosis. 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 She saw again Dr. Gutierrez, who increased the Mycophenolate to 1500 mg BID. On Saturfay patient woke up with shortness of breath and chest tightness, and back pain. Pain is not exacerbated by exertion. Chest is tender to palpation and pain is more pronounced on deep inspiration. Patient reported severe SOB since saturday. Patient was supposed to be finishing rehab tomorrow and the Rehab center sent her to the ED. Patient denies any urinary symptoms, no palpitations, denies any GI symptoms except an episode of loose stools today morning. Patient was started on prednsione taper on Satur 30 mg PO OD and is now on 10 mg PO OD. Patient is on 2L NC. Patient examination is normal with no crackles, wheezing, murmur, rub, no sign of DVT.     Labs noticeable for WBC of 11.55, Hb 8.9, D-dimer 377, CRP 39, A mildly positive WBC 8   CT chest Angio showed Bibasilar dependent subsegmental areas of atelectasis and/or scarring.  Limited Echo in the ED showed: Small Pericardial Effusion, Decreased EF, RV dilation  EKG: NSR     VS insignificant,   Vital Signs Last 24 Hrs  T(C): 35.8 (27 Jun 2023 16:10), Max: 37.1 (27 Jun 2023 13:54)  T(F): 96.4 (27 Jun 2023 16:10), Max: 98.7 (27 Jun 2023 13:54)  HR: 92 (27 Jun 2023 16:10) (92 - 97)  BP: 102/55 (27 Jun 2023 16:10) (102/55 - 123/63)  RR: 16 (27 Jun 2023 16:10) (16 - 16)  SpO2: 97% (27 Jun 2023 16:10) (97% - 98%)    O2 Parameters below as of 27 Jun 2023 16:10  Patient On (Oxygen Delivery Method): nasal cannula  O2 Flow (L/min): 2       53 yr old female with PMHx APLS, hx ofr stroke in 1992 with residual left sided weakness (on eliquis 5 mg BID), undifferentiated CTD, HbSC, hx of PE (on eliquis) presents presenting from rehab facility with chest pain, SOB, cough, headache since Saturday. History goes back to 03/23 where the patient felt weak and had abdominal pain and was admitted to [facility] turned out to have low Hb of 6.3. Patient was then discharged to [place] rehab with no clear diagnosis. 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 She saw again Dr. Gutierrez, who increased the Mycophenolate to 1500 mg BID. On Saturfay patient woke up with shortness of breath and chest tightness, and back pain. Pain is not exacerbated by exertion. Chest is tender to palpation and pain is more pronounced on deep inspiration. Patient reported severe SOB since saturday. Patient was supposed to be finishing rehab tomorrow and the Rehab center sent her to the ED. Patient denies any urinary symptoms, no palpitations, denies any GI symptoms except an episode of loose stools today morning. Patient was started on prednsione taper on Satur 30 mg PO OD and is now on 10 mg PO OD. Patient is on 2L NC. Patient examination is normal with no crackles, wheezing, murmur, rub, no sign of DVT.     Labs noticeable for WBC of 11.55, Hb 8.9, D-dimer 377, CRP 39, A mildly positive WBC 8   CT chest Angio showed Bibasilar dependent subsegmental areas of atelectasis and/or scarring.  Limited Echo in the ED showed: Small Pericardial Effusion, Decreased EF, RV dilation  EKG: NSR     VS insignificant,   Vital Signs Last 24 Hrs  T(C): 35.8 (27 Jun 2023 16:10), Max: 37.1 (27 Jun 2023 13:54)  T(F): 96.4 (27 Jun 2023 16:10), Max: 98.7 (27 Jun 2023 13:54)  HR: 92 (27 Jun 2023 16:10) (92 - 97)  BP: 102/55 (27 Jun 2023 16:10) (102/55 - 123/63)  RR: 16 (27 Jun 2023 16:10) (16 - 16)  SpO2: 97% (27 Jun 2023 16:10) (97% - 98%)    O2 Parameters below as of 27 Jun 2023 16:10  Patient On (Oxygen Delivery Method): nasal cannula  O2 Flow (L/min): 2

## 2023-06-27 NOTE — H&P ADULT - ASSESSMENT
51 yr old female with PMHx APLS, hx ofr stroke in 1992 with residual left sided weakness (on eliquis 5 mg BID), undifferentiated CTD, HbSC, hx of PE (on eliquis) presents presenting from rehab facility with chest pain, SOB, cough, headache since Saturday. History goes back to 03/23 where the patient felt weak and had abdominal pain and was admitted to [facility] turned out to have low Hb of 6.3. Patient was then discharged to [place] rehab with no clear diagnosis. 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 She saw again Dr. Gutierrez, who increased the Mycophenolate to 1500 mg BID. On Saturfay patient woke up with shortness of breath and chest tightness, and back pain. Pain is not exacerbated by exertion. Chest is tender to palpation and pain is more pronounced on deep inspiration. Patient reported severe SOB since saturday. Patient was supposed to be finishing rehab tomorrow and the Rehab center sent her to the ED. Patient denies any urinary symptoms, no palpitations, denies any GI symptoms except an episode of loose stools today morning. Patient was started on prednsione taper on Saturday 30 mg PO OD and is now on 10 mg PO OD. Patient is on 2L NC. Patient examination is normal with no crackles, wheezing, murmur, rub, no sign of DVT.       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  - Limited Echo in the ED showed: Small Pericardial Effusion, Decreased EF, RV dilation  - EKG: NSR   - Hx of APLS and lupus   - Afebrile, no signs of SIRS   - Limited Echo in the ED showed: Small Pericardial Effusion, Decreased EF, RV dilation  - troponin negative   - cw prednisone   - cw MFF 1500 mg BID   - will start on Iburpofen 600 mg TID standing   - consider starting Colchicine 0.5 mg BID if official TTE positive for effusion   - FU CBC, CMP, A1c, TSH, lipid panel   - FU TTE  - FU rheumato consult   - FU CXR AM    # SLE  # APLS  # HbSC  # Hx of stroke   # hx of PE   - cw cellcept and eliquis     Misc:   - DVT proph: Eliquis   - GI proph: protonix   - Dispo: inpatient floors   -Diet:regular 51 yr old female with PMHx APLS, hx ofr stroke in 1992 with residual left sided weakness (on eliquis 5 mg BID), undifferentiated CTD, HbSC, hx of PE (on eliquis) presents presenting from rehab facility with chest pain, SOB, cough, headache since Saturday. History goes back to 03/23 where the patient felt weak and had abdominal pain and was admitted to [facility] turned out to have low Hb of 6.3. Patient was then discharged to [place] rehab with no clear diagnosis. 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 She saw again Dr. Gutierrez, who increased the Mycophenolate to 1500 mg BID. On Saturfay patient woke up with shortness of breath and chest tightness, and back pain. Pain is not exacerbated by exertion. Chest is tender to palpation and pain is more pronounced on deep inspiration. Patient reported severe SOB since saturday. Patient was supposed to be finishing rehab tomorrow and the Rehab center sent her to the ED. Patient denies any urinary symptoms, no palpitations, denies any GI symptoms except an episode of loose stools today morning. Patient was started on prednsione taper on Saturday 30 mg PO OD and is now on 10 mg PO OD. Patient is on 2L NC. Patient examination is normal with no crackles, wheezing, murmur, rub, no sign of DVT.       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  - Limited Echo in the ED showed: Small Pericardial Effusion, Decreased EF, RV dilation  - EKG: NSR  no ischemic changes  - Hx of APLS and lupus   - Afebrile, no signs of SIRS   - Limited Echo in the ED showed: Small Pericardial Effusion, Decreased EF, RV dilation  - troponin negative   - cw prednisone   - cw MFF 1500 mg BID   - will start on Iburpofen 600 mg TID standing   - consider starting Colchicine 0.5 mg BID if official TTE positive for effusion   - FU CBC, CMP, A1c, TSH, lipid panel   - FU TTE  - FU rheumato consult   - FU CXR AM    # SLE  # APLS  # HbSC  # Hx of stroke   # hx of PE   - cw cellcept and eliquis     Misc:   - DVT proph: Eliquis   - GI proph: protonix   - Dispo: inpatient floors   -Diet:regular 53 yr old female with PMHx APLS, hx ofr stroke in 1992 with residual left sided weakness (on eliquis 5 mg BID), undifferentiated CTD, HbSC, hx of PE (on eliquis) presents presenting from rehab facility with chest pain, SOB, cough, headache since Saturday. History goes back to 03/23 where the patient felt weak and had abdominal pain and was admitted to [facility] turned out to have low Hb of 6.3. Patient was then discharged to [place] [facility] with no clear diagnosis. 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 She saw again Dr. Gutierrez, who increased the Mycophenolate to 1500 mg BID. On Saturfay patient woke up with shortness of breath and chest tightness, and back pain. Pain is not exacerbated by exertion. Chest is tender to palpation and pain is more pronounced on deep inspiration. Patient reported severe SOB since saturday. Patient was supposed to be finishing rehab tomorrow and the Rehab center sent her to the ED. Patient denies any urinary symptoms, no palpitations, denies any GI symptoms except an episode of loose stools today morning. Patient was started on prednsione taper on Saturday 30 mg PO OD and is now on 10 mg PO OD. Patient is on 2L NC. Patient examination is normal with no crackles, wheezing, murmur, rub, no sign of DVT.       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  - Limited Echo in the ED showed: Small Pericardial Effusion, Decreased EF, RV dilation  - EKG: NSR  no ischemic changes  - Hx of APLS and lupus   - Afebrile, no signs of SIRS   - Limited Echo in the ED showed: Small Pericardial Effusion, Decreased EF, RV dilation  - troponin negative   - cw prednisone   - cw MFF 1500 mg BID   - will start on Iburpofen 600 mg TID standing   - consider starting Colchicine 0.5 mg BID if official TTE positive for effusion   - FU CBC, CMP, A1c, TSH, lipid panel   - FU TTE  - FU rheumato consult   - FU CXR AM    # SLE  # APLS  # HbSC  # Hx of stroke   # hx of PE   - cw cellcept and eliquis     Misc:   - DVT proph: Eliquis   - GI proph: protonix   - Dispo: inpatient floors   -Diet:regular

## 2023-06-27 NOTE — ED PROVIDER NOTE - PROGRESS NOTE DETAILS
HAIR -- spoke with rheumatologist Dr. Gutierrez, [phone] who saw pt last 2 months ago  Confirms 3000mg per day cellcept, prednisone 5mg PO BID    2003 labs: Hemoglobin/hematocrit 10/28, Cr 0.8, no known kidney involvement in the past HAIR-- spoke with cardiology fellow Dr. Owens, who agrees to follow pt inpatient  Pt resting comfortably, reports chest pain is unchanged ED Attending WALKER Last  I have fully discussed the medical management and delivery of care with the patient. I have discussed any available labs, imaging and treatment options with the patient.  Pt admitted for further care & management.

## 2023-06-27 NOTE — ED ADULT NURSE REASSESSMENT NOTE - NS ED NURSE REASSESS COMMENT FT1
Pt assessed. A/Ox4. VSS. Pending Ct results. cardiac monitor at bedside. safety precautions maintained.

## 2023-06-27 NOTE — ED PROVIDER NOTE - OBJECTIVE STATEMENT
53yoF PMHx lupus, sickle cell trait (hemoglobin SC), stroke (w/ residual Left-sided weakness, on eliquis), PE (previously on warfarin) presenting from rehab facility with chest pain, SOB, body aches, cough, headache since waking up 4 days ago. 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 After waking up with sx 4 days ago, [facility] staff placed patient on nasal cannula oxygen, which pt reports improves her chest pain, but she denies ever needing oxygen in the past. Pt also reports body aches, back pain, lightheadedness, and an episode of stool incontinence this morning. Pt denies abdominal pain, nausea, vomiting, blood in her stool, focal weakness, numbness, tingling, cough, or fever    rheumatologist: Dr. Gutierrez 53yoF PMHx lupus, sickle cell trait (hemoglobin SC), stroke (w/ residual Left-sided weakness, on eliquis), PE (previously on warfarin) presenting from rehab facility with chest pain, SOB, body aches, cough, headache since waking up 4 days ago. 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 After waking up with sx 4 days ago, [facility] staff placed patient on nasal cannula oxygen, which pt reports improves her chest pain, but she denies ever needing oxygen in the past. Pt also reports body aches, back pain, lightheadedness, and an episode of diarrhea this morning. Pt denies abdominal pain, nausea, vomiting, blood in her stool, focal weakness, numbness, tingling, cough, or fever    rheumatologist: Dr. Gutierrez

## 2023-06-27 NOTE — ED PROVIDER NOTE - NSICDXPASTMEDICALHX_GEN_ALL_CORE_FT
PAST MEDICAL HISTORY:  Lupus     Sickle cell disease      PAST MEDICAL HISTORY:  Lupus     Pulmonary embolism     Sickle cell disease     Stroke

## 2023-06-27 NOTE — ED PROVIDER NOTE - ATTENDING APP SHARED VISIT CONTRIBUTION OF CARE
53-year-old female with past medical history of lupus, sickle cell trait, hemoglobin SC, CVA with left-sided residual weakness on Eliquis, PE peripherally on Coumadin but now taking Eliquis, [facility] left-sided chest pain that has been ongoing for the past 3 to 4 days, stabbing, nonradiating, no alleviating or precipitating factors, mild to moderate in intensity associated with shortness of breath, body aches, dry cough, rhinorrhea, congestion, and an episode of nonbloody diarrhea today.  Patient reports she had a recent episode of lupus vasculitis on March 5, 2023 with an 11-day hospitalization at [facility] and was discharged to [facility] on mycophenolate and prednisone which she has been taking every day.  Patient also reports that [facility] has been placing her on 2 L nasal cannula because it helps her chest pain but has never needed oxygen in the past.  No fever, chills, n/v, pleuritic cp,   palpitations, diaphoresis,  ha/lh/dizziness, numbness/tingling, neck pain/ stiffness, abd pain, constipation, melena/brbpr, urinary symptoms, trauma, weakness, edema, calf pain/swelling/erythema, sick contacts, recent travel or rash.    Vital Signs: I have reviewed the initial vital signs. Constitutional: Non toxic appearing pt sitting on stretcher speaking full sentences. Integumentary: No rash. ENT: MMM NECK: Supple, non-tender, no meningeal signs. Cardiovascular: RRR, radial pulses 2/4 b/l. No JVD. Respiratory: BS present b/l, ctabl, no wheezing or crackles,  no accessory muscle use, no stridor. Gastrointestinal: BS present throughout all 4 quadrants, soft, nd, nt, no rebound tenderness or guarding, no cvat. Musculoskeletal: FROM, no edema, no calf pain/swelling/erythema. Neurologic: AAOx3,no acute focal deficits.     Plan: Monitor, EKG, CXR, labs, ivf, tyl, reassess.

## 2023-06-27 NOTE — H&P ADULT - NSHPLABSRESULTS_GEN_ALL_CORE
LABS:                        8.9    11.55 )-----------( 362      ( 27 Jun 2023 16:34 )             24.1     06-27    143  |  106  |  12  ----------------------------<  136<H>  4.3   |  23  |  0.7    Ca    9.3      27 Jun 2023 16:34    TPro  7.0  /  Alb  4.1  /  TBili  0.7  /  DBili  x   /  AST  15  /  ALT  17  /  AlkPhos  90  06-27    PT/INR - ( 27 Jun 2023 16:36 )   PT: 14.50 sec;   INR: 1.26 ratio         PTT - ( 27 Jun 2023 16:36 )  PTT:34.3 sec    CT chest Angio showed Bibasilar dependent subsegmental areas of atelectasis and/or scarring.  Limited Echo in the ED showed: Small Pericardial Effusion, Decreased EF, RV dilation

## 2023-06-27 NOTE — ED PROVIDER NOTE - CLINICAL SUMMARY MEDICAL DECISION MAKING FREE TEXT BOX
53-year-old female with past medical history of lupus, sickle cell trait, hemoglobin SC, CVA with left-sided residual weakness on Eliquis, PE peripherally on Coumadin but now taking Eliquis, presents from [facility] for left-sided chest pain that has been ongoing for the past 3 to 4 days, stabbing, nonradiating, no alleviating or precipitating factors, mild to moderate in intensity associated with shortness of breath, body aches, dry cough, rhinorrhea, congestion, and an episode of nonbloody diarrhea today.  Patient reports she had a recent episode of lupus vasculitis on March 5, 2023 with an 11-day hospitalization at [facility] and was discharged to [facility] on mycophenolate and prednisone which she has been taking every day.  Patient also reports that [facility] has been placing her on 2 L nasal cannula because it helps her chest pain but has never needed oxygen in the past.  No fever, chills, n/v, pleuritic cp,   palpitations, diaphoresis,  ha/lh/dizziness, numbness/tingling, neck pain/ stiffness, abd pain, constipation, melena/brbpr, urinary symptoms, trauma, weakness, edema, calf pain/swelling/erythema, sick contacts, recent travel or rash.    Vital Signs: I have reviewed the initial vital signs. Constitutional: Non toxic appearing pt sitting on stretcher speaking full sentences. Integumentary: No rash. ENT: MMM NECK: Supple, non-tender, no meningeal signs. Cardiovascular: RRR, radial pulses 2/4 b/l. No JVD. Respiratory: BS present b/l, ctabl, no wheezing or crackles,  no accessory muscle use, no stridor. Gastrointestinal: BS present throughout all 4 quadrants, soft, nd, nt, no rebound tenderness or guarding, no cvat. Musculoskeletal: FROM, no edema, no calf pain/swelling/erythema. Neurologic: AAOx3,no acute focal deficits.     Plan: Monitor, EKG, CXR, labs, ivf, tyl, reassess.     Labs and EKG were ordered and reviewed.  Imaging was ordered and reviewed by me.  Appropriate medications for patient's presenting complaints were ordered and effects were reassessed.  Patient's records (prior hospital, ED visit) were reviewed.  Additional history was obtained from EMS. 53-year-old female with past medical history of lupus, sickle cell trait, hemoglobin SC, CVA with left-sided residual weakness on Eliquis, PE peripherally on Coumadin but now taking Eliquis, [facility] for left-sided chest pain that has been ongoing for the past 3 to 4 days, stabbing, nonradiating, no alleviating or precipitating factors, mild to moderate in intensity associated with shortness of breath, body aches, dry cough, rhinorrhea, congestion, and an episode of nonbloody diarrhea today.  Patient reports she had a recent episode of lupus vasculitis on March 5, 2023 with an 11-day hospitalization at [facility] and was discharged to [facility] on mycophenolate and prednisone which she has been taking every day.  Patient also reports that [facility] has been placing her on 2 L nasal cannula because it helps her chest pain but has never needed oxygen in the past.  No fever, chills, n/v, pleuritic cp,   palpitations, diaphoresis,  ha/lh/dizziness, numbness/tingling, neck pain/ stiffness, abd pain, constipation, melena/brbpr, urinary symptoms, trauma, weakness, edema, calf pain/swelling/erythema, sick contacts, recent travel or rash.    Vital Signs: I have reviewed the initial vital signs. Constitutional: Non toxic appearing pt sitting on stretcher speaking full sentences. Integumentary: No rash. ENT: MMM NECK: Supple, non-tender, no meningeal signs. Cardiovascular: RRR, radial pulses 2/4 b/l. No JVD. Respiratory: BS present b/l, ctabl, no wheezing or crackles,  no accessory muscle use, no stridor. Gastrointestinal: BS present throughout all 4 quadrants, soft, nd, nt, no rebound tenderness or guarding, no cvat. Musculoskeletal: FROM, no edema, no calf pain/swelling/erythema. Neurologic: AAOx3,no acute focal deficits.     Plan: Monitor, EKG, CXR, labs, ivf, tyl, reassess.     Labs and EKG were ordered and reviewed.  Imaging was ordered and reviewed by me.  Appropriate medications for patient's presenting complaints were ordered and effects were reassessed.  Patient's records (prior hospital, ED visit) were reviewed.  Additional history was obtained from EMS. Escalation to admission/observation was considered. Patient requires inpatient hospitalization - monitored setting.

## 2023-06-27 NOTE — H&P ADULT - ATTENDING COMMENTS
51 yr old female with PMHx APLS, hx ofr stroke in 1992 with residual left sided weakness (on eliquis 5 mg BID), undifferentiated CTD, HbSC, hx of PE (on eliquis) presents presenting from rehab facility with chest pain, SOB, cough, headache since Saturday. History goes back to 03/23 where the patient felt weak and had abdominal pain and was admitted to [facility] turned out to have low Hb of 6.3. Patient was then discharged to [facility] with no clear diagnosis. 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 She saw again Dr. Gutierrez, who increased the Mycophenolate to 1500 mg BID. On Saturfay patient woke up with shortness of breath and chest tightness, and back pain. Pain is not exacerbated by exertion  # Chest pain most likely secondary to lupus pericarditis vs pleuritis   # LIANG   - SOB more on exertion with chest pain (tender to palpation) and more pronounced on deep inspiration since saturday   - mild cough   - WBC of 11.55, Hb 8.9,   - D-dimer 377, CRP 39,  - CT chest Angio showed Bibasilar dependent subsegmental areas of atelectasis and/or scarring.  - Limited Echo in the ED showed: Small Pericardial Effusion, Decreased EF, RV dilation  - EKG: NSR  no ischemic changes  - Hx of APLS and lupus   - Afebrile, no signs of SIRS   - Limited Echo in the ED showed: Small Pericardial Effusion, Decreased EF, RV dilation  - troponin negative , trend trop  - cw prednisone   - cw MFF 1500 mg BID   - will start on Iburpofen 600 mg TID standing   - consider starting Colchicine 0.5 mg BID if official TTE positive for effusion   - FU CBC, CMP, A1c, TSH, lipid panel   - FU TTE  - FU rheumato consult   - FU CXR AM    # SLE  # APLS  # HbSC  # Hx of stroke   # hx of PE   - cw cellcept and eliquis   Plan of care was discussed with patient, in great details, All questions were answered to their satisfication.  Seems to understand, and in agreement

## 2023-06-28 LAB
A1C WITH ESTIMATED AVERAGE GLUCOSE RESULT: 4.8 % — SIGNIFICANT CHANGE UP (ref 4–5.6)
ALBUMIN SERPL ELPH-MCNC: 3.6 G/DL — SIGNIFICANT CHANGE UP (ref 3.5–5.2)
ALBUMIN SERPL ELPH-MCNC: 3.7 G/DL — SIGNIFICANT CHANGE UP (ref 3.5–5.2)
ALP SERPL-CCNC: 81 U/L — SIGNIFICANT CHANGE UP (ref 30–115)
ALP SERPL-CCNC: 91 U/L — SIGNIFICANT CHANGE UP (ref 30–115)
ALT FLD-CCNC: 14 U/L — SIGNIFICANT CHANGE UP (ref 0–41)
ALT FLD-CCNC: 14 U/L — SIGNIFICANT CHANGE UP (ref 0–41)
ANA TITR SER: NEGATIVE — SIGNIFICANT CHANGE UP
ANION GAP SERPL CALC-SCNC: 12 MMOL/L — SIGNIFICANT CHANGE UP (ref 7–14)
AST SERPL-CCNC: 12 U/L — SIGNIFICANT CHANGE UP (ref 0–41)
AST SERPL-CCNC: 14 U/L — SIGNIFICANT CHANGE UP (ref 0–41)
BASOPHILS # BLD AUTO: 0.03 K/UL — SIGNIFICANT CHANGE UP (ref 0–0.2)
BASOPHILS NFR BLD AUTO: 0.3 % — SIGNIFICANT CHANGE UP (ref 0–1)
BILIRUB DIRECT SERPL-MCNC: <0.2 MG/DL — SIGNIFICANT CHANGE UP (ref 0–0.3)
BILIRUB INDIRECT FLD-MCNC: >0.2 MG/DL — SIGNIFICANT CHANGE UP (ref 0.2–1.2)
BILIRUB SERPL-MCNC: 0.4 MG/DL — SIGNIFICANT CHANGE UP (ref 0.2–1.2)
BILIRUB SERPL-MCNC: 0.5 MG/DL — SIGNIFICANT CHANGE UP (ref 0.2–1.2)
BUN SERPL-MCNC: 10 MG/DL — SIGNIFICANT CHANGE UP (ref 10–20)
CALCIUM SERPL-MCNC: 9 MG/DL — SIGNIFICANT CHANGE UP (ref 8.4–10.5)
CHLORIDE SERPL-SCNC: 108 MMOL/L — SIGNIFICANT CHANGE UP (ref 98–110)
CHOLEST SERPL-MCNC: 147 MG/DL — SIGNIFICANT CHANGE UP
CO2 SERPL-SCNC: 24 MMOL/L — SIGNIFICANT CHANGE UP (ref 17–32)
CREAT SERPL-MCNC: 0.6 MG/DL — LOW (ref 0.7–1.5)
CREAT SERPL-MCNC: 0.6 MG/DL — LOW (ref 0.7–1.5)
EGFR: 107 ML/MIN/1.73M2 — SIGNIFICANT CHANGE UP
EGFR: 107 ML/MIN/1.73M2 — SIGNIFICANT CHANGE UP
EOSINOPHIL # BLD AUTO: 0.11 K/UL — SIGNIFICANT CHANGE UP (ref 0–0.7)
EOSINOPHIL NFR BLD AUTO: 1.3 % — SIGNIFICANT CHANGE UP (ref 0–8)
ESTIMATED AVERAGE GLUCOSE: 91 MG/DL — SIGNIFICANT CHANGE UP (ref 68–114)
FLUAV AG NPH QL: SIGNIFICANT CHANGE UP
FLUBV AG NPH QL: SIGNIFICANT CHANGE UP
GLUCOSE SERPL-MCNC: 101 MG/DL — HIGH (ref 70–99)
HCG UR QL: NEGATIVE — SIGNIFICANT CHANGE UP
HCT VFR BLD CALC: 21.9 % — LOW (ref 37–47)
HCT VFR BLD CALC: 23.3 % — LOW (ref 37–47)
HDLC SERPL-MCNC: 73 MG/DL — SIGNIFICANT CHANGE UP
HGB BLD-MCNC: 8.2 G/DL — LOW (ref 12–16)
HGB BLD-MCNC: 8.4 G/DL — LOW (ref 12–16)
IMM GRANULOCYTES NFR BLD AUTO: 0.5 % — HIGH (ref 0.1–0.3)
INR BLD: 1.08 RATIO — SIGNIFICANT CHANGE UP (ref 0.65–1.3)
INR BLD: 1.25 RATIO — SIGNIFICANT CHANGE UP (ref 0.65–1.3)
LIPID PNL WITH DIRECT LDL SERPL: 65 MG/DL — SIGNIFICANT CHANGE UP
LYMPHOCYTES # BLD AUTO: 2.31 K/UL — SIGNIFICANT CHANGE UP (ref 1.2–3.4)
LYMPHOCYTES # BLD AUTO: 26.4 % — SIGNIFICANT CHANGE UP (ref 20.5–51.1)
MAGNESIUM SERPL-MCNC: 1.8 MG/DL — SIGNIFICANT CHANGE UP (ref 1.8–2.4)
MCHC RBC-ENTMCNC: 32.6 PG — HIGH (ref 27–31)
MCHC RBC-ENTMCNC: 33.7 PG — HIGH (ref 27–31)
MCHC RBC-ENTMCNC: 36.1 G/DL — SIGNIFICANT CHANGE UP (ref 32–37)
MCHC RBC-ENTMCNC: 37.4 G/DL — HIGH (ref 32–37)
MCV RBC AUTO: 90.1 FL — SIGNIFICANT CHANGE UP (ref 81–99)
MCV RBC AUTO: 90.3 FL — SIGNIFICANT CHANGE UP (ref 81–99)
MONOCYTES # BLD AUTO: 1.08 K/UL — HIGH (ref 0.1–0.6)
MONOCYTES NFR BLD AUTO: 12.4 % — HIGH (ref 1.7–9.3)
NEUTROPHILS # BLD AUTO: 5.17 K/UL — SIGNIFICANT CHANGE UP (ref 1.4–6.5)
NEUTROPHILS NFR BLD AUTO: 59.1 % — SIGNIFICANT CHANGE UP (ref 42.2–75.2)
NON HDL CHOLESTEROL: 74 MG/DL — SIGNIFICANT CHANGE UP
NRBC # BLD: 2 /100 WBCS — HIGH (ref 0–0)
NRBC # BLD: 2 /100 WBCS — HIGH (ref 0–0)
PHOSPHATE SERPL-MCNC: 3.5 MG/DL — SIGNIFICANT CHANGE UP (ref 2.1–4.9)
PLATELET # BLD AUTO: 326 K/UL — SIGNIFICANT CHANGE UP (ref 130–400)
PLATELET # BLD AUTO: 361 K/UL — SIGNIFICANT CHANGE UP (ref 130–400)
PMV BLD: 9.5 FL — SIGNIFICANT CHANGE UP (ref 7.4–10.4)
PMV BLD: 9.7 FL — SIGNIFICANT CHANGE UP (ref 7.4–10.4)
POTASSIUM SERPL-MCNC: 3.9 MMOL/L — SIGNIFICANT CHANGE UP (ref 3.5–5)
POTASSIUM SERPL-SCNC: 3.9 MMOL/L — SIGNIFICANT CHANGE UP (ref 3.5–5)
PROT SERPL-MCNC: 6.1 G/DL — SIGNIFICANT CHANGE UP (ref 6–8)
PROT SERPL-MCNC: 6.4 G/DL — SIGNIFICANT CHANGE UP (ref 6–8)
PROTHROM AB SERPL-ACNC: 12.3 SEC — SIGNIFICANT CHANGE UP (ref 9.95–12.87)
PROTHROM AB SERPL-ACNC: 14.3 SEC — HIGH (ref 9.95–12.87)
RBC # BLD: 2.43 M/UL — LOW (ref 4.2–5.4)
RBC # BLD: 2.58 M/UL — LOW (ref 4.2–5.4)
RBC # FLD: 13.8 % — SIGNIFICANT CHANGE UP (ref 11.5–14.5)
RBC # FLD: 14.1 % — SIGNIFICANT CHANGE UP (ref 11.5–14.5)
RSV RNA NPH QL NAA+NON-PROBE: SIGNIFICANT CHANGE UP
SARS-COV-2 RNA SPEC QL NAA+PROBE: DETECTED
SODIUM SERPL-SCNC: 144 MMOL/L — SIGNIFICANT CHANGE UP (ref 135–146)
TRIGL SERPL-MCNC: 43 MG/DL — SIGNIFICANT CHANGE UP
TROPONIN T SERPL-MCNC: <0.01 NG/ML — SIGNIFICANT CHANGE UP
TROPONIN T SERPL-MCNC: <0.01 NG/ML — SIGNIFICANT CHANGE UP
TSH SERPL-MCNC: 0.52 UIU/ML — SIGNIFICANT CHANGE UP (ref 0.27–4.2)
WBC # BLD: 8.74 K/UL — SIGNIFICANT CHANGE UP (ref 4.8–10.8)
WBC # BLD: 8.8 K/UL — SIGNIFICANT CHANGE UP (ref 4.8–10.8)
WBC # FLD AUTO: 8.74 K/UL — SIGNIFICANT CHANGE UP (ref 4.8–10.8)
WBC # FLD AUTO: 8.8 K/UL — SIGNIFICANT CHANGE UP (ref 4.8–10.8)

## 2023-06-28 PROCEDURE — 99222 1ST HOSP IP/OBS MODERATE 55: CPT

## 2023-06-28 PROCEDURE — 71045 X-RAY EXAM CHEST 1 VIEW: CPT | Mod: 26

## 2023-06-28 PROCEDURE — 99233 SBSQ HOSP IP/OBS HIGH 50: CPT

## 2023-06-28 RX ORDER — FOLIC ACID 0.8 MG
1 TABLET ORAL DAILY
Refills: 0 | Status: DISCONTINUED | OUTPATIENT
Start: 2023-06-28 | End: 2023-07-11

## 2023-06-28 RX ORDER — REMDESIVIR 5 MG/ML
100 INJECTION INTRAVENOUS EVERY 24 HOURS
Refills: 0 | Status: COMPLETED | OUTPATIENT
Start: 2023-06-29 | End: 2023-07-02

## 2023-06-28 RX ORDER — WARFARIN SODIUM 2.5 MG/1
1 TABLET ORAL
Qty: 0 | Refills: 0 | DISCHARGE

## 2023-06-28 RX ORDER — REMDESIVIR 5 MG/ML
INJECTION INTRAVENOUS
Refills: 0 | Status: COMPLETED | OUTPATIENT
Start: 2023-06-28 | End: 2023-07-02

## 2023-06-28 RX ORDER — MYCOPHENOLATE MOFETIL 250 MG/1
1500 CAPSULE ORAL
Refills: 0 | Status: DISCONTINUED | OUTPATIENT
Start: 2023-06-28 | End: 2023-07-11

## 2023-06-28 RX ORDER — ACETAMINOPHEN 500 MG
2 TABLET ORAL
Qty: 0 | Refills: 0 | DISCHARGE

## 2023-06-28 RX ORDER — IBUPROFEN 200 MG
600 TABLET ORAL THREE TIMES A DAY
Refills: 0 | Status: DISCONTINUED | OUTPATIENT
Start: 2023-06-28 | End: 2023-07-04

## 2023-06-28 RX ORDER — MYCOPHENOLATE MOFETIL 250 MG/1
2 CAPSULE ORAL
Qty: 0 | Refills: 0 | DISCHARGE

## 2023-06-28 RX ORDER — MYCOPHENOLATE MOFETIL 250 MG/1
3 CAPSULE ORAL
Refills: 0 | DISCHARGE

## 2023-06-28 RX ORDER — REMDESIVIR 5 MG/ML
200 INJECTION INTRAVENOUS EVERY 24 HOURS
Refills: 0 | Status: COMPLETED | OUTPATIENT
Start: 2023-06-28 | End: 2023-06-28

## 2023-06-28 RX ORDER — APIXABAN 2.5 MG/1
5 TABLET, FILM COATED ORAL EVERY 12 HOURS
Refills: 0 | Status: DISCONTINUED | OUTPATIENT
Start: 2023-06-28 | End: 2023-07-11

## 2023-06-28 RX ORDER — APIXABAN 2.5 MG/1
1 TABLET, FILM COATED ORAL
Refills: 0 | DISCHARGE

## 2023-06-28 RX ORDER — PANTOPRAZOLE SODIUM 20 MG/1
40 TABLET, DELAYED RELEASE ORAL
Refills: 0 | Status: DISCONTINUED | OUTPATIENT
Start: 2023-06-28 | End: 2023-07-11

## 2023-06-28 RX ADMIN — APIXABAN 5 MILLIGRAM(S): 2.5 TABLET, FILM COATED ORAL at 17:07

## 2023-06-28 RX ADMIN — MYCOPHENOLATE MOFETIL 1500 MILLIGRAM(S): 250 CAPSULE ORAL at 06:00

## 2023-06-28 RX ADMIN — Medication 600 MILLIGRAM(S): at 22:24

## 2023-06-28 RX ADMIN — Medication 600 MILLIGRAM(S): at 07:00

## 2023-06-28 RX ADMIN — Medication 10 MILLIGRAM(S): at 06:00

## 2023-06-28 RX ADMIN — MYCOPHENOLATE MOFETIL 1500 MILLIGRAM(S): 250 CAPSULE ORAL at 17:07

## 2023-06-28 RX ADMIN — Medication 600 MILLIGRAM(S): at 01:41

## 2023-06-28 RX ADMIN — Medication 600 MILLIGRAM(S): at 06:00

## 2023-06-28 RX ADMIN — Medication 600 MILLIGRAM(S): at 12:34

## 2023-06-28 RX ADMIN — Medication 1 MILLIGRAM(S): at 12:33

## 2023-06-28 RX ADMIN — APIXABAN 5 MILLIGRAM(S): 2.5 TABLET, FILM COATED ORAL at 06:00

## 2023-06-28 RX ADMIN — Medication 600 MILLIGRAM(S): at 23:00

## 2023-06-28 RX ADMIN — REMDESIVIR 200 MILLIGRAM(S): 5 INJECTION INTRAVENOUS at 17:06

## 2023-06-28 NOTE — CONSULT NOTE ADULT - ASSESSMENT
Impression   # Atypical chest pain - likely MSK   # APSL   # HO stroke and PE    ECG NSR   Troponin negative x 1     Recommendations   - Repeat troponin   - Follow up TTE     This a preliminary plan. Will need to discuss with attending.   Signature: Chai BETANCOURT, 1st year Cardiology Fellow   Impression   # Atypical chest pain - likely MSK vs pericarditis vs lupus serositis   # APSL   # HO stroke and PE    ECG NSR   Troponin negative x 1     Recommendations   - Repeat troponin   - Follow up TTE   - Consider adding colchicine 0.6 mg BID   - Discuss with ID need to treat COVID given immunosuppression     Discussed with attending.   Signature: Chai BETANCOURT, 1st year Cardiology Fellow     yes

## 2023-06-28 NOTE — CONSULT NOTE ADULT - ASSESSMENT
*This is an incomplete note*    Patient is a 51 yr old female with PMHx APLS, hx of stroke in 1992 with residual left sided weakness (on eliquis 5 mg BID, preciously on coumadin), undifferentiated CTD, HbSC, hx of PE (on eliquis) presents presenting from rehab facility with chest pain (Not exacerbated by exertion, Chest TTP, increased on deep inspiration), SOB, cough, headache since Saturday. Mycophenolate dose recently increased to 1500mg BID. Started on prednisone      History goes back to 03/23 where the patient felt weak and had abdominal pain and was admitted to [facility] turned out to have low Hb of 6.3. Patient was then discharged to [facility] with no clear diagnosis. 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 She saw again Dr. Gutierrez, who increased the Mycophenolate to 1500 mg BID. On Saturfay patient woke up with shortness of breath and chest tightness, and back pain. Pain is not exacerbated by exertion. Chest is tender to palpation and pain is more pronounced on deep inspiration. Patient reported severe SOB since saturday. Patient was supposed to be finishing rehab tomorrow and the Rehab center sent her to the ED. Patient denies any urinary symptoms, no palpitations, denies any GI symptoms except an episode of loose stools today morning. Patient was started on prednsione taper on Satur 30 mg PO OD and is now on 10 mg PO OD. Patient is on 2L NC. Patient examination is normal with no crackles, wheezing, murmur, rub, no sign of DVT.     Labs noticeable for WBC of 11.55, Hb 8.9, D-dimer 377, CRP 39, A mildly positive WBC 8   CT chest Angio showed Bibasilar dependent subsegmental areas of atelectasis and/or scarring.  Limited Echo in the ED showed: Small Pericardial Effusion, Decreased EF, RV dilation    #Low Back Pain: Patient with low back pain x1 week found to have hemolytic anemia. Patient with ROS also positive for weight loss, alopecia photosensitive rash, xerophthalmia, joint stiffness. Negative CT scan and MRI L spine r/o mechanical low back pain. Labs significant for hemolytic anemia at this time but YAJAIRA, LYLE, complements, SSA, SSB, direct shi, LAC, UA, RF negative. Pertinent positive results include Hb electrophoresis confirming Hb SC disease. Most likely pain crisis from Hb SC disease  -treatment per primary team and Heme/onc  -pain control per pain management   -will f/u remaining labs dsDNA, complements, APLS labs, SPEP, UPEP    #Undifferntiated CTD:  In 2012, patient had lethargy, alopecia, arthritis with Jacoud changes, arthropathy b/l hands, weight loss was dx with undifferentiated CTD, patient's symptoms responded to Plaquenil. In 2018, CellCept was started. Baseline Hb 10-11. Negative labs include YAJAIRA, LYLE, dsDNA, SSA, SSB, Complements, ANCA, CCP, RF, anti-histone, B2GP, ACL, LAC from [facility] chart review [id number]. Immunoglobulins 2180 and IgG4 142 in 2016 with repeat IgG4 . SLE cannot be diagnosed in the setting of negative YAJAIRA  -c/w home prednisone and cellcept  -w/u as described above    #APLS: Patient was diagnosed in 1992 s.p stroke, was started on coumadin. In 2001 patient saw a hematologist, whoc recommended d/cing coumadin given antibodies negative and had vitreous hemorrhage. Patient was clinically ok until 2009 when she was diagnosed with b/l PE and coumadin was restarted. 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 It is also documented that she failed coumadin and was started on pradaxa, however patient reports currently being on coumadin. INR subtherapeutic on admission, c/f medication nonadherence?   -c/w home meds  -w/u as described above   *This is an incomplete note*    Patient is a 51 yr old female with PMHx APLS, hx of stroke in 1992 with residual left sided weakness (on eliquis 5 mg BID, previously on coumadin), undifferentiated CTD, HbSC, hx of PE (on eliquis) presents presenting from rehab facility with chest pain (Not exacerbated by exertion, Chest TTP, increased on deep inspiration), SOB, cough, headache since Saturday. Mycophenolate dose recently increased to 1500mg BID. Started on prednisone taper on Saturday 30mg QD, now on 10mg QD.    Relevant previous history (Obtained from charts):  Patient was diagnosed with APLS in 1992 s.p stroke, was started on coumadin. In 2001 patient saw a hematologist, who recommended d/cing coumadin given antibodies negative and had vitreous hemorrhage. Patient was clinically ok until 2009 when she was diagnosed with b/l PE and coumadin was restarted. 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 It is also documented that she failed coumadin and was started on pradaxa, however patient denies being on pradaxa. INR subtherapeutic on that admission, medication nonadherence?. In 2012, patient had lethargy, alopecia, arthritis with Jacoud changes, arthropathy b/l hands, weight loss was dx with undifferentiated CTD, patient's symptoms responded to Plaquenil. In 2018, CellCept was started. Right now patient on MMF, Prednisone taper and FA.    Relevant labs/tests from this admission:  D-dimer 377  CRP 39  ESR 30  Bedside POCUS shows small pericardial effusion, decreased EF and RV dilation  CT chest angio shows No PE, Bibasilar dependent subsegmental areas of atelectasis and/or scarring    Relevant labs/tests from before this admission:  YAJAIRA, ANCA, LYLE, anti-histone, ACL, complements, dsDNA, SSA, SSB, direct shi, LAC, UA, RF, CCP negative. Pertinent positive results include Hb electrophoresis confirming Hb SC disease    #Concern for pericarditis of autoimmune etiology  #Undifferentiated CTD  #APLS  #Hb SC ds    -c/w Ibuprofen  -Start Colchicine- 0.5mg q12 on Day 1, then 0.5mg QD  -Start Protonix for GI px  -Trend ESR, CRP  -Official TTE to evaluate for pericardial effusion  -Heme consult to evaluate for sickle cell crisis  -Will follow      *This is an incomplete note* *This is an incomplete note*    Patient is a 51 yr old female with PMHx APLS, hx of stroke in 1992 with residual left sided weakness (on eliquis 5 mg BID, previously on coumadin), undifferentiated CTD, HbSC, hx of PE (on eliquis) presents presenting from rehab facility with chest pain (Not exacerbated by exertion, Chest TTP, increased on deep inspiration), SOB, cough, headache since Saturday. Mycophenolate dose recently increased to 1500mg BID. Started on prednisone taper on Saturday 30mg QD, now on 10mg QD.    Relevant previous history (Obtained from charts):  Patient was diagnosed with APLS in 1992 s.p stroke, was started on coumadin. In 2001 patient saw a hematologist, who recommended d/cing coumadin given antibodies negative and had vitreous hemorrhage. Patient was clinically ok until 2009 when she was diagnosed with b/l PE and coumadin was restarted. 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 It is also documented that she failed coumadin and was started on pradaxa, however patient denies being on pradaxa. INR subtherapeutic on that admission, medication nonadherence?. In 2012, patient had lethargy, alopecia, arthritis with Jacoud changes, arthropathy b/l hands, weight loss was dx with undifferentiated CTD, patient's symptoms responded to Plaquenil. In 2018, CellCept was started. Right now patient on MMF, Prednisone taper and FA.    Relevant labs/tests from this admission:  D-dimer 377  CRP 39  ESR 30  Bedside POCUS shows small pericardial effusion, decreased EF and RV dilation  EKG shows no ST abnormalities  CT chest angio shows No PE, Bibasilar dependent subsegmental areas of atelectasis and/or scarring    Relevant labs/tests from before this admission:  YAJAIRA, ANCA, LYLE, anti-histone, ACL, complements, dsDNA, SSA, SSB, direct shi, LAC, UA, RF, CCP negative. Pertinent positive results include Hb electrophoresis confirming Hb SC disease    #Concern for pericarditis  vs MSK etiology- r/o Ischemia  #Undifferentiated CTD  #APLS  #Hb SC ds    -c/w Ibuprofen  -Start Colchicine- 0.5mg q12 on Day 1, then 0.5mg QD??  -Start Protonix for GI px??  -Trend ESR, CRP??  -Official TTE to evaluate for pericardial effusion  -Heme consult to evaluate for sickle cell crisis??  -Cardio consult to evaluate for ischemia  -Will follow      *This is an incomplete note* Patient is a 51 yr old female with PMHx APLS, hx of stroke in 1992 with residual left sided weakness (on eliquis 5 mg BID, previously on coumadin), undifferentiated CTD, HbSC, hx of PE (on eliquis) presents presenting from rehab facility with chest pain (Not exacerbated by exertion, Chest TTP, increased on deep inspiration), SOB, cough, headache since Saturday. Mycophenolate dose recently increased to 1500mg BID. Started on prednisone taper on Saturday 30mg QD, now on 10mg QD.    Relevant previous history (Obtained from charts):  Patient was diagnosed with APLS in 1992 s.p stroke, was started on coumadin. In 2001 patient saw a hematologist, who recommended d/cing coumadin given antibodies negative and had vitreous hemorrhage. Patient was clinically ok until 2009 when she was diagnosed with b/l PE and coumadin was restarted. 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 It is also documented that she failed coumadin and was started on pradaxa, however patient denies being on pradaxa. INR subtherapeutic on that admission, medication nonadherence?. In 2012, patient had lethargy, alopecia, arthritis with Jacoud changes, arthropathy b/l hands, weight loss was dx with undifferentiated CTD, patient's symptoms responded to Plaquenil. In 2018, CellCept was started. Right now patient on MMF, Prednisone taper and FA.    Relevant labs/tests from this admission:  D-dimer 377  CRP 39  ESR 30  Bedside POCUS shows small pericardial effusion, decreased EF and RV dilation  EKG shows no ST abnormalities  CT chest angio shows No PE, Bibasilar dependent subsegmental areas of atelectasis and/or scarring    Relevant labs/tests from before this admission:  YAJAIRA, ANCA, LYLE, anti-histone, ACL, complements, dsDNA, SSA, SSB, direct shi, LAC, UA, RF, CCP negative. Pertinent positive results include Hb electrophoresis confirming Hb SC disease    #Concern for pericarditis  vs MSK etiology (likely)- r/o Ischemia  #Undifferentiated CTD  #APLS  #Hb SC ds    - Please send YAJAIRA, LYLE, complements C3 and C4, double stranded DNA antibody, beta 2 GP 1 IgG and IgM, anticardiolipin IgG and IgM, and lupus anticoagulant  - Pt is currently pending formal TTE  - Continue home mycophenolate mofetil 1500 mg BID and prednisone 10 mg q day. Pt can follow up with her outpatient rheumatologist Dr. Hawthorne for medication management

## 2023-06-28 NOTE — PROGRESS NOTE ADULT - SUBJECTIVE AND OBJECTIVE BOX
FATEMEH PATRICIO  53y  [facility] Hold 026 A      Patient is a 53y old  Female who presents with a chief complaint of     INTERVAL HPI/OVERNIGHT EVENTS:        REVIEW OF SYSTEMS:        FAMILY HISTORY:    T(C): 36.7 (06-28-23 @ 05:37), Max: 37.1 (06-27-23 @ 13:54)  HR: 86 (06-28-23 @ 05:37) (86 - 97)  BP: 98/57 (06-28-23 @ 05:37) (98/57 - 123/63)  RR: 18 (06-28-23 @ 01:14) (16 - 18)  SpO2: 98% (06-28-23 @ 05:37) (97% - 99%)  Wt(kg): --Vital Signs Last 24 Hrs  T(C): 36.7 (28 Jun 2023 05:37), Max: 37.1 (27 Jun 2023 13:54)  T(F): 98 (28 Jun 2023 05:37), Max: 98.7 (27 Jun 2023 13:54)  HR: 86 (28 Jun 2023 05:37) (86 - 97)  BP: 98/57 (28 Jun 2023 05:37) (98/57 - 123/63)  BP(mean): 71 (28 Jun 2023 05:37) (71 - 79)  RR: 18 (28 Jun 2023 01:14) (16 - 18)  SpO2: 98% (28 Jun 2023 05:37) (97% - 99%)    Parameters below as of 28 Jun 2023 05:37  Patient On (Oxygen Delivery Method): room air        PHYSICAL EXAM:  GENERAL: NAD, well-groomed, well-developed  HEAD:  Atraumatic, Normocephalic  EYES: EOMI, PERRLA, conjunctiva and sclera clear  ENMT: No tonsillar erythema, exudates, or enlargement; Moist mucous membranes, Good dentition, No lesions  NECK: Supple, No JVD, Normal thyroid  NERVOUS SYSTEM:  Alert & Oriented X3, Good concentration; Motor Strength 5/5 B/L upper and lower extremities; DTRs 2+ intact and symmetric  PULM: Clear to auscultation bilaterally  CARDIAC: Regular rate and rhythm; No murmurs, rubs, or gallops  GI: Soft, Nontender, Nondistended; Bowel sounds present  EXTREMITIES:  2+ Peripheral Pulses, No clubbing, cyanosis, or edema  LYMPH: No lymphadenopathy noted  SKIN: No rashes or lesions    Consultant(s) Notes Reviewed:  [x ] YES  [ ] NO  Care Discussed with Consultants/Other Providers [ x] YES  [ ] NO    LABS:                            8.9    11.55 )-----------( 362      ( 27 Jun 2023 16:34 )             24.1   06-27    143  |  106  |  12  ----------------------------<  136<H>  4.3   |  23  |  0.7    Ca    9.3      27 Jun 2023 16:34    TPro  7.0  /  Alb  4.1  /  TBili  0.7  /  DBili  x   /  AST  15  /  ALT  17  /  AlkPhos  90  06-27            apixaban 5 milliGRAM(s) Oral every 12 hours  folic acid 1 milliGRAM(s) Oral daily  ibuprofen  Tablet. 600 milliGRAM(s) Oral three times a day  mycophenolate mofetil 1500 milliGRAM(s) Oral two times a day  predniSONE   Tablet 10 milliGRAM(s) Oral daily      51 yr old female with PMHx APLS, hx ofr stroke in 1992 with residual left sided weakness (on eliquis 5 mg BID), undifferentiated CTD, HbSC, hx of PE (on eliquis) presents presenting from rehab facility with chest pain, SOB, cough, headache since Saturday. History goes back to 03/23 where the patient felt weak and had abdominal pain and was admitted to [facility] turned out to have low Hb of 6.3. Patient was then discharged to [place] rehab with no clear diagnosis. 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 She saw again Dr. Gutierrez, who increased the Mycophenolate to 1500 mg BID. On Saturfay patient woke up with shortness of breath and chest tightness, and back pain. Pain is not exacerbated by exertion    1. 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 Hx of APLS and lupus   * SOB more on exertion with chest pain (tender to palpation) and more pronounced on deep inspiration since saturday   - Telemetry                      - ECG: WNL               - D-dimer: 377             - CRP:39   - Cyclic cardiac enzymes  - Cont prednisone   - Cont MFF 1500mg po bid   - Started on ibuprofen 600mg po tid   - CT chest Angio showed Bibasilar dependent subsegmental areas of atelectasis and/or scarring.  - Echocardio:pending   * Limited Echo in the ED showed: Small Pericardial Effusion, Decreased EF, RV dilation   - Rheumatology consult:pending     2. HbSC/ Hx of stroke /hx of PE   - cw cellcept and eliquis   Plan of care was discussed with patient, in great details, All questions were answered to their satisfication.  Seems to understand, and in agreement .     3. DVT/GI px    WELDONPATRICIO ZULUAGA  53y  [facility] 026 A      Patient is a 53y old  Female who presents with a chief complaint of     INTERVAL HPI/OVERNIGHT EVENTS:  Patient is still complaining of chest pain   denies fever.   was placed on oxygen overnight  no other events noted         FAMILY HISTORY:    T(C): 36.7 (06-28-23 @ 05:37), Max: 37.1 (06-27-23 @ 13:54)  HR: 86 (06-28-23 @ 05:37) (86 - 97)  BP: 98/57 (06-28-23 @ 05:37) (98/57 - 123/63)  RR: 18 (06-28-23 @ 01:14) (16 - 18)  SpO2: 98% (06-28-23 @ 05:37) (97% - 99%)  Wt(kg): --Vital Signs Last 24 Hrs  T(C): 36.7 (28 Jun 2023 05:37), Max: 37.1 (27 Jun 2023 13:54)  T(F): 98 (28 Jun 2023 05:37), Max: 98.7 (27 Jun 2023 13:54)  HR: 86 (28 Jun 2023 05:37) (86 - 97)  BP: 98/57 (28 Jun 2023 05:37) (98/57 - 123/63)  BP(mean): 71 (28 Jun 2023 05:37) (71 - 79)  RR: 18 (28 Jun 2023 01:14) (16 - 18)  SpO2: 98% (28 Jun 2023 05:37) (97% - 99%)    Parameters below as of 28 Jun 2023 05:37  Patient On (Oxygen Delivery Method): room air        PHYSICAL EXAM:  GENERAL: NAD, well-groomed, well-developed  NERVOUS SYSTEM:  Alert & Oriented X3,  PULM: Clear to auscultation bilaterally  CARDIAC: Regular rate and rhythm;   GI: Soft, Nontender, Nondistended; Bowel sounds present  EXTREMITIES:  2+ Peripheral Pulses,     Consultant(s) Notes Reviewed:  [x ] YES  [ ] NO  Care Discussed with Consultants/Other Providers [ x] YES  [ ] NO    LABS:                            8.9    11.55 )-----------( 362      ( 27 Jun 2023 16:34 )             24.1   06-27    143  |  106  |  12  ----------------------------<  136<H>  4.3   |  23  |  0.7    Ca    9.3      27 Jun 2023 16:34    TPro  7.0  /  Alb  4.1  /  TBili  0.7  /  DBili  x   /  AST  15  /  ALT  17  /  AlkPhos  90  06-27            apixaban 5 milliGRAM(s) Oral every 12 hours  folic acid 1 milliGRAM(s) Oral daily  ibuprofen  Tablet. 600 milliGRAM(s) Oral three times a day  mycophenolate mofetil 1500 milliGRAM(s) Oral two times a day  predniSONE   Tablet 10 milliGRAM(s) Oral daily      51 yr old female with PMHx APLS, hx ofr stroke in 1992 with residual left sided weakness (on eliquis 5 mg BID), undifferentiated CTD, HbSC, hx of PE (on eliquis) presents presenting from rehab facility with chest pain, SOB, cough, headache since Saturday. History goes back to 03/23 where the patient felt weak and had abdominal pain and was admitted to [facility] turned out to have low Hb of 6.3. Patient was then discharged to [place] rehab with no clear diagnosis. 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 She saw again Dr. Gutierrez, who increased the Mycophenolate to 1500 mg BID. On Saturfay patient woke up with shortness of breath and chest tightness, and back pain. Pain is not exacerbated by exertion    1. 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 Hx of APLS and lupus   * SOB more on exertion with chest pain (tender to palpation) and more pronounced on deep inspiration since saturday   - Telemetry                      - ECG: WNL               - D-dimer: 377             - CRP:39       - dsDNA:pending  - Cyclic cardiac enzymes negative*1   - Cont prednisone   - Cont MFF 1500mg po bid   - Started on ibuprofen 600mg po tid   - CT chest Angio showed Bibasilar dependent subsegmental areas of atelectasis and/or scarring.  - Echocardio:pending   * Limited Echo in the ED showed: Small Pericardial Effusion, Decreased EF, RV dilation   - Rheumatology consult:pending   - Cardio consult:pending   - PT eval:pending    2. Normocytic anemia slightly worse than baseline HbSC/ Hx of stroke /hx of PE   * no signs of bleeding   - cw cellcept and eliquis   - Repeat hb       3. DVT/GI px    WELDONPATRICIO ZULUAGA  53y  [facility] ED Hold 026 A      Patient is a 53y old  Female who presents with a chief complaint of     INTERVAL HPI/OVERNIGHT EVENTS:  Patient is still complaining of chest pain   denies fever.   was placed on oxygen overnight  no other events noted         FAMILY HISTORY:    T(C): 36.7 (06-28-23 @ 05:37), Max: 37.1 (06-27-23 @ 13:54)  HR: 86 (06-28-23 @ 05:37) (86 - 97)  BP: 98/57 (06-28-23 @ 05:37) (98/57 - 123/63)  RR: 18 (06-28-23 @ 01:14) (16 - 18)  SpO2: 98% (06-28-23 @ 05:37) (97% - 99%)  Wt(kg): --Vital Signs Last 24 Hrs  T(C): 36.7 (28 Jun 2023 05:37), Max: 37.1 (27 Jun 2023 13:54)  T(F): 98 (28 Jun 2023 05:37), Max: 98.7 (27 Jun 2023 13:54)  HR: 86 (28 Jun 2023 05:37) (86 - 97)  BP: 98/57 (28 Jun 2023 05:37) (98/57 - 123/63)  BP(mean): 71 (28 Jun 2023 05:37) (71 - 79)  RR: 18 (28 Jun 2023 01:14) (16 - 18)  SpO2: 98% (28 Jun 2023 05:37) (97% - 99%)    Parameters below as of 28 Jun 2023 05:37  Patient On (Oxygen Delivery Method): room air        PHYSICAL EXAM:  GENERAL: NAD, well-groomed, well-developed  NERVOUS SYSTEM:  Alert & Oriented X3,  PULM: Clear to auscultation bilaterally  CARDIAC: Regular rate and rhythm;   GI: Soft, Nontender, Nondistended; Bowel sounds present  EXTREMITIES:  2+ Peripheral Pulses,     Consultant(s) Notes Reviewed:  [x ] YES  [ ] NO  Care Discussed with Consultants/Other Providers [ x] YES  [ ] NO    LABS:                            8.9    11.55 )-----------( 362      ( 27 Jun 2023 16:34 )             24.1   06-27    143  |  106  |  12  ----------------------------<  136<H>  4.3   |  23  |  0.7    Ca    9.3      27 Jun 2023 16:34    TPro  7.0  /  Alb  4.1  /  TBili  0.7  /  DBili  x   /  AST  15  /  ALT  17  /  AlkPhos  90  06-27            apixaban 5 milliGRAM(s) Oral every 12 hours  folic acid 1 milliGRAM(s) Oral daily  ibuprofen  Tablet. 600 milliGRAM(s) Oral three times a day  mycophenolate mofetil 1500 milliGRAM(s) Oral two times a day  predniSONE   Tablet 10 milliGRAM(s) Oral daily      51 yr old female with PMHx APLS, hx ofr stroke in 1992 with residual left sided weakness (on eliquis 5 mg BID), undifferentiated CTD, HbSC, hx of PE (on eliquis) presents presenting from rehab facility with chest pain, SOB, cough, headache since Saturday. History goes back to 03/23 where the patient felt weak and had abdominal pain and was admitted to [facility] turned out to have low Hb of 6.3. Patient was then discharged to [place] rehab with no clear diagnosis. 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 She saw again Dr. Gutierrez, who increased the Mycophenolate to 1500 mg BID. On Saturfay patient woke up with shortness of breath and chest tightness, and back pain. Pain is not exacerbated by exertion    1. 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 Hx of APLS and lupus   * SOB more on exertion with chest pain (tender to palpation) and more pronounced on deep inspiration since saturday   - Telemetry                      - ECG: WNL               - D-dimer: 377             - CRP:39       - dsDNA:pending        - COVID 19 :positive   - Start remdisvir for possible symptomatic covid 19 d:1   - Cyclic cardiac enzymes negative*1    - Cont prednisone   - Cont MFF 1500mg po bid   - Started on ibuprofen 600mg po tid   - CT chest Angio showed Bibasilar dependent subsegmental areas of atelectasis and/or scarring.  - Echocardio:pending   * Limited Echo in the ED showed: Small Pericardial Effusion, Decreased EF, RV dilation   - Rheumatology consult:pending   - Cardio consult:pending   - ID consult :pending   - PT eval:pending    2. Normocytic anemia slightly worse than baseline HbSC/ Hx of stroke /hx of PE   * no signs of bleeding   - cw cellcept and eliquis   - Repeat hb       3. DVT/GI px

## 2023-06-28 NOTE — CONSULT NOTE ADULT - SUBJECTIVE AND OBJECTIVE BOX
Outpt cardiologist:    Reason for Consult:     HISTORY OF PRESENT ILLNESS:  51 yr old female with PMHx APLS, hx ofr stroke in  with residual left sided weakness (on eliquis 5 mg BID), undifferentiated CTD, HbSC, hx of PE (on eliquis) presents presenting from rehab facility with chest pain, SOB, cough, headache since Saturday. History goes back to  where the patient felt weak and had abdominal pain and was admitted to [facility] turned out to have low Hb of 6.3. Patient was then discharged to [place] [facility]hab with no clear diagnosis. 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 She saw again Dr. Gutierrez, who increased the Mycophenolate to 1500 mg BID. On  patient woke up with shortness of breath and chest tightness, and back pain. Pain is not exacerbated by exertion. Chest is tender to palpation and pain is more pronounced on deep inspiration. Patient reported severe SOB since saturday. Patient was supposed to be finishing rehab tomorrow and the Rehab center sent her to the ED. Patient denies any urinary symptoms, no palpitations, denies any GI symptoms except an episode of loose stools today morning. Patient was started on prednsione taper on  30 mg PO OD and is now on 10 mg PO OD. Patient is on 2L NC. Patient examination is normal with no crackles, wheezing, murmur, rub, no sign of DVT.     Labs noticeable for WBC of 11.55, Hb 8.9, D-dimer 377, CRP 39, A mildly positive WBC 8   CT chest Angio showed Bibasilar dependent subsegmental areas of atelectasis and/or scarring.  Limited Echo in the ED showed: Small Pericardial Effusion, Decreased EF, RV dilation  EKG: NSR     VS insignificant,   Vital Signs Last 24 Hrs  T(C): 35.8 (2023 16:10), Max: 37.1 (2023 13:54)  T(F): 96.4 (2023 16:10), Max: 98.7 (2023 13:54)  HR: 92 (2023 16:10) (92 - 97)  BP: 102/55 (2023 16:10) (102/55 - 123/63)  RR: 16 (2023 16:10) (16 - 16)  SpO2: 97% (2023 16:10) (97% - 98%)    O2 Parameters below as of 2023 16:10  Patient On (Oxygen Delivery Method): nasal cannula  O2 Flow (L/min): 2       (2023 23:19)      Cardiology Fellow Notes    Previous Cardiac Workup    Risk Factors:      PAST MEDICAL & SURGICAL HISTORY  Lupus    Sickle cell disease    Stroke    Pulmonary embolism    S/P cholecystectomy        FAMILY HISTORY:  FAMILY HISTORY:      SOCIAL HISTORY:  Social History:      ALLERGIES:  No Known Allergies      MEDICATIONS:  apixaban 5 milliGRAM(s) Oral every 12 hours  folic acid 1 milliGRAM(s) Oral daily  ibuprofen  Tablet. 600 milliGRAM(s) Oral three times a day  mycophenolate mofetil 1500 milliGRAM(s) Oral two times a day  predniSONE   Tablet 10 milliGRAM(s) Oral daily    PRN:      HOME MEDICATIONS:  Home Medications:  Eliquis 5 mg oral tablet: 1 tab(s) orally 2 times a day (2023 00:30)  mycophenolate mofetil 500 mg oral tablet: 3 tab(s) orally 2 times a day (2023 00:30)  predniSONE 10 mg oral tablet: 1 tab(s) orally once a day (2023 00:29)      VITALS:   T(F): 98.3 ( @ 08:32), Max: 98.7 ( @ 13:54)  HR: 75 ( @ 08:32) (75 - 97)  BP: 99/55 ( @ 08:32) (98/57 - 123/63)  BP(mean): 71 ( @ 05:37) (71 - 79)  RR: 18 ( @ 08:32) (16 - 18)  SpO2: 98% ( @ 08:32) (97% - 99%)    I&O's Summary      REVIEW OF SYSTEMS:  CONSTITUTIONAL: No weakness, fevers or chills  HEENT: No visual changes, neck/ear pain  RESPIRATORY: No cough, sob  CARDIOVASCULAR: See HPI  GASTROINTESTINAL: No abdominal pain. No nausea, vomiting, diarrhea   GENITOURINARY: No dysuria, frequency or hematuria  NEUROLOGICAL: No new focal deficits  SKIN: No new rashes    PHYSICAL EXAM:  General: Not in distress.  Non-toxic appearing.   HEENT: EOMI  Cardio: regular, S1, S2, no murmur  Pulm: B/L BS.  No wheezing / crackles / rales  Abdomen: Soft, non-tender, non-distended. Normoactive bowel sounds  Extremities: No edema b/l le  Neuro: A&O x3. No focal deficits    LABS:                        8.2    8.74  )-----------( 326      ( 2023 07:21 )             21.9         144  |  108  |  10  ----------------------------<  101<H>  3.9   |  24  |  0.6<L>    Ca    9.0      2023 07:21  Phos  3.5       Mg     1.8         TPro  6.1  /  Alb  3.6  /  TBili  0.5  /  DBili  x   /  AST  14  /  ALT  14  /  AlkPhos  81      PT/INR - ( 2023 07:21 )   PT: 14.30 sec;   INR: 1.25 ratio         PTT - ( 2023 16:36 )  PTT:34.3 sec  Sedimentation Rate, Erythrocyte: 30 mm/Hr *H* (23 @ 16:36)  Troponin T, Serum: <0.01 ng/mL (23 @ 16:34)  Lactate, Blood: 1.6 mmol/L (23 @ 16:34)    CARDIAC MARKERS ( 2023 16:34 )  x     / <0.01 ng/mL / x     / x     / x            Troponin trend:       Chol 147 LDL -- HDL 73 Trig 43      IMAGING:    -CT:  < from: CT Angio Chest PE Protocol w/ IV Cont (23 @ 19:30) >  FINDINGS:  Pulmonary embolus: No CT evidence of acute pulmonary embolus.    Lungs/Airways/Pleura: Azygos fissure noted. Central tracheal bronchial   airways are patent. No acute lobar consolidation. No pleural effusion or   pneumothorax. Bibasilar dependent subsegmental areas of atelectasis   and/or scarring. No pleural effusion or pneumothorax    Heart/Vascular: No pericardial effusion.    Mediastinum/Lymph nodes: No lymphadenopathy identified    Visualized upper abdomen: Surgically absent gallbladder    Bones/soft tissues: Mild degenerative change along the vertebral column    IMPRESSION:  1.  No evidence of acute pulmonary embolus or acute thoracic pathology.      EC Lead ECG:   Ventricular Rate 90 BPM    Atrial Rate 90 BPM    P-R Interval 116 ms    QRS Duration 90 ms    Q-T Interval 364 ms    QTC Calculation(Bazett) 445 ms    P Axis 78 degrees    R Axis 65 degrees    T Axis 47 degrees    Diagnosis Line Normal sinus rhythm  Nonspecific T wave abnormality  Abnormal ECG    Confirmed by ROBERT HERNÁNDEZ MD ([id number]) on 2023 6:54:24 AM ( @ 15:49)      TELEMETRY EVENTS:   Outpt cardiologist:    Reason for Consult:     HISTORY OF PRESENT ILLNESS:  51 yr old female with PMHx APLS, hx ofr stroke in  with residual left sided weakness (on eliquis 5 mg BID), undifferentiated CTD, HbSC, hx of PE (on eliquis) presents presenting from rehab facility with chest pain, SOB, cough, headache since Saturday. History goes back to  where the patient felt weak and had abdominal pain and was admitted to [facility] turned out to have low Hb of 6.3. Patient was then discharged to [place] [facility] with no clear diagnosis. 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 She saw again Dr. Gutierrez, who increased the Mycophenolate to 1500 mg BID. On  patient woke up with shortness of breath and chest tightness, and back pain. Pain is not exacerbated by exertion. Chest is tender to palpation and pain is more pronounced on deep inspiration. Patient reported severe SOB since saturday. Patient was supposed to be finishing rehab tomorrow and the Rehab center sent her to the ED. Patient denies any urinary symptoms, no palpitations, denies any GI symptoms except an episode of loose stools today morning. Patient was started on prednsione taper on  30 mg PO OD and is now on 10 mg PO OD. Patient is on 2L NC. Patient examination is normal with no crackles, wheezing, murmur, rub, no sign of DVT.     Labs noticeable for WBC of 11.55, Hb 8.9, D-dimer 377, CRP 39, A mildly positive WBC 8   CT chest Angio showed Bibasilar dependent subsegmental areas of atelectasis and/or scarring.  Limited Echo in the ED showed: Small Pericardial Effusion, Decreased EF, RV dilation  EKG: NSR     VS insignificant,   Vital Signs Last 24 Hrs  T(C): 35.8 (2023 16:10), Max: 37.1 (2023 13:54)  T(F): 96.4 (2023 16:10), Max: 98.7 (2023 13:54)  HR: 92 (2023 16:10) (92 - 97)  BP: 102/55 (2023 16:10) (102/55 - 123/63)  RR: 16 (2023 16:10) (16 - 16)  SpO2: 97% (2023 16:10) (97% - 98%)    O2 Parameters below as of 2023 16:10  Patient On (Oxygen Delivery Method): nasal cannula  O2 Flow (L/min): 2       (2023 23:19)      PAST MEDICAL & SURGICAL HISTORY  Lupus    Sickle cell disease    Stroke    Pulmonary embolism    S/P cholecystectomy        FAMILY HISTORY:  FAMILY HISTORY:      SOCIAL HISTORY:  Social History:      ALLERGIES:  No Known Allergies      MEDICATIONS:  apixaban 5 milliGRAM(s) Oral every 12 hours  folic acid 1 milliGRAM(s) Oral daily  ibuprofen  Tablet. 600 milliGRAM(s) Oral three times a day  mycophenolate mofetil 1500 milliGRAM(s) Oral two times a day  predniSONE   Tablet 10 milliGRAM(s) Oral daily    PRN:      HOME MEDICATIONS:  Home Medications:  Eliquis 5 mg oral tablet: 1 tab(s) orally 2 times a day (2023 00:30)  mycophenolate mofetil 500 mg oral tablet: 3 tab(s) orally 2 times a day (2023 00:30)  predniSONE 10 mg oral tablet: 1 tab(s) orally once a day (2023 00:29)      VITALS:   T(F): 98.3 ( @ 08:32), Max: 98.7 ( @ 13:54)  HR: 75 ( @ 08:32) (75 - 97)  BP: 99/55 ( @ 08:32) (98/57 - 123/63)  BP(mean): 71 ( @ 05:37) (71 - 79)  RR: 18 ( @ 08:32) (16 - 18)  SpO2: 98% ( @ 08:32) (97% - 99%)    I&O's Summary      REVIEW OF SYSTEMS:  CONSTITUTIONAL: No weakness, fevers or chills  HEENT: No visual changes, neck/ear pain  RESPIRATORY: No cough, sob  CARDIOVASCULAR: See HPI  GASTROINTESTINAL: No abdominal pain. No nausea, vomiting, diarrhea   GENITOURINARY: No dysuria, frequency or hematuria  NEUROLOGICAL: No new focal deficits  SKIN: No new rashes    PHYSICAL EXAM:  General: Not in distress.  Non-toxic appearing.   HEENT: EOMI  Cardio: regular, S1, S2, no murmur  Pulm: B/L BS.  No wheezing / crackles / rales  Abdomen: Soft, non-tender, non-distended. Normoactive bowel sounds  Extremities: No edema b/l le  Neuro: A&O x3. No focal deficits    LABS:                        8.2    8.74  )-----------( 326      ( 2023 07:21 )             21.9         144  |  108  |  10  ----------------------------<  101<H>  3.9   |  24  |  0.6<L>    Ca    9.0      2023 07:21  Phos  3.5       Mg     1.8         TPro  6.1  /  Alb  3.6  /  TBili  0.5  /  DBili  x   /  AST  14  /  ALT  14  /  AlkPhos  81      PT/INR - ( 2023 07:21 )   PT: 14.30 sec;   INR: 1.25 ratio         PTT - ( 2023 16:36 )  PTT:34.3 sec  Sedimentation Rate, Erythrocyte: 30 mm/Hr *H* (23 @ 16:36)  Troponin T, Serum: <0.01 ng/mL (23 @ 16:34)  Lactate, Blood: 1.6 mmol/L (23 @ 16:34)    CARDIAC MARKERS ( 2023 16:34 )  x     / <0.01 ng/mL / x     / x     / x            Troponin trend:       Chol 147 LDL -- HDL 73 Trig 43      IMAGING:    -CT:  < from: CT Angio Chest PE Protocol w/ IV Cont (23 @ 19:30) >  FINDINGS:  Pulmonary embolus: No CT evidence of acute pulmonary embolus.    Lungs/Airways/Pleura: Azygos fissure noted. Central tracheal bronchial   airways are patent. No acute lobar consolidation. No pleural effusion or   pneumothorax. Bibasilar dependent subsegmental areas of atelectasis   and/or scarring. No pleural effusion or pneumothorax    Heart/Vascular: No pericardial effusion.    Mediastinum/Lymph nodes: No lymphadenopathy identified    Visualized upper abdomen: Surgically absent gallbladder    Bones/soft tissues: Mild degenerative change along the vertebral column    IMPRESSION:  1.  No evidence of acute pulmonary embolus or acute thoracic pathology.      EC Lead ECG:   Ventricular Rate 90 BPM    Atrial Rate 90 BPM    P-R Interval 116 ms    QRS Duration 90 ms    Q-T Interval 364 ms    QTC Calculation(Bazett) 445 ms    P Axis 78 degrees    R Axis 65 degrees    T Axis 47 degrees    Diagnosis Line Normal sinus rhythm  Nonspecific T wave abnormality  Abnormal ECG    Confirmed by ROBERT HERNÁNDEZ MD ([id number]) on 2023 6:54:24 AM ( @ 15:49)      TELEMETRY EVENTS:

## 2023-06-28 NOTE — CONSULT NOTE ADULT - SUBJECTIVE AND OBJECTIVE BOX
HISTORY OF PRESENT ILLNESS:  51 yr old female with PMHx APLS, hx ofr stroke in 1992 with residual left sided weakness (on eliquis 5 mg BID), undifferentiated CTD, HbSC, hx of PE (on eliquis) presents presenting from rehab facility with chest pain, SOB, cough, headache since Saturday. History goes back to 03/23 where the patient felt weak and had abdominal pain and was admitted to [facility] turned out to have low Hb of 6.3. Patient was then discharged to [facility] with no clear diagnosis. 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 She saw again Dr. Gutierrez, who increased the Mycophenolate to 1500 mg BID. On Saturfay patient woke up with shortness of breath and chest tightness, and back pain. Pain is not exacerbated by exertion. Chest is tender to palpation and pain is more pronounced on deep inspiration. Patient reported severe SOB since saturday. Patient was supposed to be finishing rehab tomorrow and the Rehab center sent her to the ED. Patient denies any urinary symptoms, no palpitations, denies any GI symptoms except an episode of loose stools today morning. Patient was started on prednsione taper on Satur 30 mg PO OD and is now on 10 mg PO OD. Patient is on 2L NC. Patient examination is normal with no crackles, wheezing, murmur, rub, no sign of DVT.     Labs noticeable for WBC of 11.55, Hb 8.9, D-dimer 377, CRP 39, A mildly positive WBC 8   CT chest Angio showed Bibasilar dependent subsegmental areas of atelectasis and/or scarring.  Limited Echo in the ED showed: Small Pericardial Effusion, Decreased EF, RV dilation  EKG: NSR     VS insignificant,   Vital Signs Last 24 Hrs  T(C): 35.8 (27 Jun 2023 16:10), Max: 37.1 (27 Jun 2023 13:54)  T(F): 96.4 (27 Jun 2023 16:10), Max: 98.7 (27 Jun 2023 13:54)  HR: 92 (27 Jun 2023 16:10) (92 - 97)  BP: 102/55 (27 Jun 2023 16:10) (102/55 - 123/63)  RR: 16 (27 Jun 2023 16:10) (16 - 16)  SpO2: 97% (27 Jun 2023 16:10) (97% - 98%)    O2 Parameters below as of 27 Jun 2023 16:10  Patient On (Oxygen Delivery Method): nasal cannula  O2 Flow (L/min): 2      PAST MEDICAL & SURGICAL HISTORY:  Lupus  Sickle cell disease  Stroke  Pulmonary embolism  S/P cholecystectomy      REVIEW OF SYSTEMS    General:	  Skin/Breast:	  Ophthalmologic:	  ENT:	  Respiratory and Thorax:	  Cardiovascular:	  Gastrointestinal:	  Genitourinary:	  Musculoskeletal:	  Neurological:	  Psychiatric:	  Hematology/Lymphatics:	  Endocrine:	  Allergic/Immunologic:	    MEDICATIONS  (STANDING):  apixaban 5 milliGRAM(s) Oral every 12 hours  folic acid 1 milliGRAM(s) Oral daily  ibuprofen  Tablet. 600 milliGRAM(s) Oral three times a day  mycophenolate mofetil 1500 milliGRAM(s) Oral two times a day  predniSONE   Tablet 10 milliGRAM(s) Oral daily    Allergies  No Known Allergies    PERTINENT MEDICATION HISTORY:    SOCIAL HISTORY:    FAMILY HISTORY:      Vital Signs Last 24 Hrs  T(C): 36.8 (28 Jun 2023 08:32), Max: 37.1 (27 Jun 2023 13:54)  T(F): 98.3 (28 Jun 2023 08:32), Max: 98.7 (27 Jun 2023 13:54)  HR: 75 (28 Jun 2023 08:32) (75 - 97)  BP: 99/55 (28 Jun 2023 08:32) (98/57 - 123/63)  BP(mean): 71 (28 Jun 2023 05:37) (71 - 79)  RR: 18 (28 Jun 2023 08:32) (16 - 18)  SpO2: 98% (28 Jun 2023 08:32) (97% - 99%)    Parameters below as of 28 Jun 2023 05:37  Patient On (Oxygen Delivery Method): room air    Daily Height in cm: 165.1 (27 Jun 2023 13:54)      PHYSICAL EXAM:  Constitutional:  Eyes:  ENT:  Neck:  Breasts:  Back:  Respiratory:  Cardiovascular:  Gastrointestinal:  Genitourinary:  Rectal:  Extremities:  Vascular:  Neurological:  Skin:  Lymph Nodes:  Musculoskeletal:  Psychiatric:      LABS:                        8.9    11.55 )-----------( 362      ( 27 Jun 2023 16:34 )             24.1     06-28    144  |  108  |  10  ----------------------------<  101<H>  3.9   |  24  |  0.6<L>    Ca    9.0      28 Jun 2023 07:21  Phos  3.5     06-28  Mg     1.8     06-28    TPro  6.1  /  Alb  3.6  /  TBili  0.5  /  DBili  x   /  AST  14  /  ALT  14  /  AlkPhos  81  06-28    PT/INR - ( 28 Jun 2023 07:21 )   PT: 14.30 sec;   INR: 1.25 ratio         PTT - ( 27 Jun 2023 16:36 )  PTT:34.3 sec  Urinalysis Basic - ( 28 Jun 2023 07:21 )    Color: x / Appearance: x / SG: x / pH: x  Gluc: 101 mg/dL / Ketone: x  / Bili: x / Urobili: x   Blood: x / Protein: x / Nitrite: x   Leuk Esterase: x / RBC: x / WBC x   Sq Epi: x / Non Sq Epi: x / Bacteria: x        RADIOLOGY & ADDITIONAL STUDIES:

## 2023-06-28 NOTE — CONSULT NOTE ADULT - ATTENDING COMMENTS
No cardiac history.    Comorbidities as above.  Notable, unclear connective tissue disease associated with APLAS (prior CVA / PE).    Pleuritic chest pain.  Being treated for possible CTD flare.  Subsequent COVID diagnosis (vaccinated / first known infection).    Elevated inflammatory markers.  Hemodynamics stable.  No PNA.  Good SaO2.  Ruled-out MI.    EKG: NSR    - ECHO  - Hold anticoagulation if pericardial effusion  - Cont Ibuprofen.  - Start Colchicine  - Consider COVID treatment given comorbidities
54 y/o woman with h/o antiphospholipid syndrome with CVA and PE, as well as undifferentiated connective tissue disease, admitted with chest tightness and SOB x 3 days, rheumatology consulted for possible underlying rheumatologic etiology. Pt was diagnosed with undifferentiated connective tissue disease based on arthritis (previous notes mentioned Jaccoud's arthropathy but there is no e/o this on exam today), alopecia, hemolytic anemia, photosensitive rash and xerophthalmia. However, her previous rheum workup in the [facility] system has been negative, including YAJAIRA, Martinez, complements, RF, CCP, Ro, La, Scl70, LAC, B2GP1. She follows with Dr. Madhav Hawthorne as an outpatient. She was admitted 3/2023 with anemia and reportedly GI diagnosed her with lupus vasculitis, although it is unclear how this diagnosis was made. She is currently taking mycophenolate mofetil 1500 mg BID and prednisone 10 mg q day. 3 days prior to current admission, pt developed burning b/l chest pain in her mid-sternum as well as her b/l chest wall, which has improved somewhat on ibuprofen so far. Pt had a POCUS TTE in the ED which demonstrated a small pericardial effusion and a CTA chest which demonstrated no e/o thrombosis or pericardial effusion. She was seen by cardiology and her chest pain was thought to be musculoskeletal. Her workup was otherwise significant for CRP 39.3 with normal ESR, normal UA, anemia of 8.9 and leukocytosis to 11.5 (which may be related to her prednisone use). Pt was also found to be Covid positive during this admission. She denies any h/o chest pain with her UCTD in the past. Overall, the suspicion that pt's symptoms are related to an underlying rheumatologic condition is low. Unclear whether pt previously had positive YAJAIRA or other labs related to systemic connective tissue diseases as antibody levels can wane over time, but there is no evidence at this point that her chest pain is related to either SLE or another systemic connective tissue disease.  - Please send YAJAIRA, LYLE, complements C3 and C4, double stranded DNA antibody, beta 2 GP 1 IgG and IgM, anticardiolipin IgG and IgM, and lupus anticoagulant  - Pt is currently pending formal TTE  - Continue home mycophenolate mofetil 1500 mg BID and prednisone 10 mg q day. Pt can follow up with her outpatient rheumatologist Dr. Hawthorne for medication management

## 2023-06-29 LAB
ALBUMIN SERPL ELPH-MCNC: 3.8 G/DL — SIGNIFICANT CHANGE UP (ref 3.5–5.2)
ALP SERPL-CCNC: 79 U/L — SIGNIFICANT CHANGE UP (ref 30–115)
ALT FLD-CCNC: 13 U/L — SIGNIFICANT CHANGE UP (ref 0–41)
ANION GAP SERPL CALC-SCNC: 11 MMOL/L — SIGNIFICANT CHANGE UP (ref 7–14)
APTT BLD: 34.3 SEC — SIGNIFICANT CHANGE UP (ref 27–39.2)
AST SERPL-CCNC: 11 U/L — SIGNIFICANT CHANGE UP (ref 0–41)
BILIRUB DIRECT SERPL-MCNC: 0.2 MG/DL — SIGNIFICANT CHANGE UP (ref 0–0.3)
BILIRUB INDIRECT FLD-MCNC: 0.3 MG/DL — SIGNIFICANT CHANGE UP (ref 0.2–1.2)
BILIRUB SERPL-MCNC: 0.5 MG/DL — SIGNIFICANT CHANGE UP (ref 0.2–1.2)
BUN SERPL-MCNC: 11 MG/DL — SIGNIFICANT CHANGE UP (ref 10–20)
CALCIUM SERPL-MCNC: 9 MG/DL — SIGNIFICANT CHANGE UP (ref 8.4–10.5)
CHLORIDE SERPL-SCNC: 108 MMOL/L — SIGNIFICANT CHANGE UP (ref 98–110)
CO2 SERPL-SCNC: 23 MMOL/L — SIGNIFICANT CHANGE UP (ref 17–32)
CREAT SERPL-MCNC: 0.7 MG/DL — SIGNIFICANT CHANGE UP (ref 0.7–1.5)
CULTURE RESULTS: SIGNIFICANT CHANGE UP
CULTURE RESULTS: SIGNIFICANT CHANGE UP
DSDNA AB SER-ACNC: <12 IU/ML — SIGNIFICANT CHANGE UP
EGFR: 103 ML/MIN/1.73M2 — SIGNIFICANT CHANGE UP
GLUCOSE SERPL-MCNC: 101 MG/DL — HIGH (ref 70–99)
HCT VFR BLD CALC: 22.6 % — LOW (ref 37–47)
HGB BLD-MCNC: 8.3 G/DL — LOW (ref 12–16)
INR BLD: 1.11 RATIO — SIGNIFICANT CHANGE UP (ref 0.65–1.3)
MAGNESIUM SERPL-MCNC: 1.8 MG/DL — SIGNIFICANT CHANGE UP (ref 1.8–2.4)
MCHC RBC-ENTMCNC: 33.3 PG — HIGH (ref 27–31)
MCHC RBC-ENTMCNC: 36.7 G/DL — SIGNIFICANT CHANGE UP (ref 32–37)
MCV RBC AUTO: 90.8 FL — SIGNIFICANT CHANGE UP (ref 81–99)
NRBC # BLD: 2 /100 WBCS — HIGH (ref 0–0)
PLATELET # BLD AUTO: 372 K/UL — SIGNIFICANT CHANGE UP (ref 130–400)
PMV BLD: 9.7 FL — SIGNIFICANT CHANGE UP (ref 7.4–10.4)
POTASSIUM SERPL-MCNC: 3.8 MMOL/L — SIGNIFICANT CHANGE UP (ref 3.5–5)
POTASSIUM SERPL-SCNC: 3.8 MMOL/L — SIGNIFICANT CHANGE UP (ref 3.5–5)
PROT SERPL-MCNC: 6.3 G/DL — SIGNIFICANT CHANGE UP (ref 6–8)
PROTHROM AB SERPL-ACNC: 12.7 SEC — SIGNIFICANT CHANGE UP (ref 9.95–12.87)
RBC # BLD: 2.49 M/UL — LOW (ref 4.2–5.4)
RBC # FLD: 14 % — SIGNIFICANT CHANGE UP (ref 11.5–14.5)
SODIUM SERPL-SCNC: 142 MMOL/L — SIGNIFICANT CHANGE UP (ref 135–146)
SPECIMEN SOURCE: SIGNIFICANT CHANGE UP
SPECIMEN SOURCE: SIGNIFICANT CHANGE UP
WBC # BLD: 7.44 K/UL — SIGNIFICANT CHANGE UP (ref 4.8–10.8)
WBC # FLD AUTO: 7.44 K/UL — SIGNIFICANT CHANGE UP (ref 4.8–10.8)

## 2023-06-29 PROCEDURE — 99232 SBSQ HOSP IP/OBS MODERATE 35: CPT

## 2023-06-29 RX ORDER — OXYCODONE HYDROCHLORIDE 5 MG/1
2.5 TABLET ORAL EVERY 8 HOURS
Refills: 0 | Status: DISCONTINUED | OUTPATIENT
Start: 2023-06-29 | End: 2023-06-30

## 2023-06-29 RX ORDER — DEXAMETHASONE 0.5 MG/5ML
6 ELIXIR ORAL DAILY
Refills: 0 | Status: DISCONTINUED | OUTPATIENT
Start: 2023-06-29 | End: 2023-06-30

## 2023-06-29 RX ORDER — SODIUM CHLORIDE 9 MG/ML
500 INJECTION, SOLUTION INTRAVENOUS ONCE
Refills: 0 | Status: COMPLETED | OUTPATIENT
Start: 2023-06-29 | End: 2023-06-29

## 2023-06-29 RX ADMIN — MYCOPHENOLATE MOFETIL 1500 MILLIGRAM(S): 250 CAPSULE ORAL at 05:28

## 2023-06-29 RX ADMIN — Medication 600 MILLIGRAM(S): at 18:45

## 2023-06-29 RX ADMIN — OXYCODONE HYDROCHLORIDE 2.5 MILLIGRAM(S): 5 TABLET ORAL at 14:34

## 2023-06-29 RX ADMIN — APIXABAN 5 MILLIGRAM(S): 2.5 TABLET, FILM COATED ORAL at 18:02

## 2023-06-29 RX ADMIN — Medication 10 MILLIGRAM(S): at 05:29

## 2023-06-29 RX ADMIN — REMDESIVIR 200 MILLIGRAM(S): 5 INJECTION INTRAVENOUS at 16:02

## 2023-06-29 RX ADMIN — MYCOPHENOLATE MOFETIL 1500 MILLIGRAM(S): 250 CAPSULE ORAL at 18:03

## 2023-06-29 RX ADMIN — Medication 1 MILLIGRAM(S): at 11:44

## 2023-06-29 RX ADMIN — OXYCODONE HYDROCHLORIDE 2.5 MILLIGRAM(S): 5 TABLET ORAL at 14:04

## 2023-06-29 RX ADMIN — APIXABAN 5 MILLIGRAM(S): 2.5 TABLET, FILM COATED ORAL at 05:29

## 2023-06-29 RX ADMIN — Medication 600 MILLIGRAM(S): at 05:28

## 2023-06-29 RX ADMIN — PANTOPRAZOLE SODIUM 40 MILLIGRAM(S): 20 TABLET, DELAYED RELEASE ORAL at 06:56

## 2023-06-29 RX ADMIN — SODIUM CHLORIDE 1000 MILLILITER(S): 9 INJECTION, SOLUTION INTRAVENOUS at 08:20

## 2023-06-29 RX ADMIN — Medication 6 MILLIGRAM(S): at 11:44

## 2023-06-29 RX ADMIN — Medication 600 MILLIGRAM(S): at 22:26

## 2023-06-29 NOTE — CONSULT NOTE ADULT - SUBJECTIVE AND OBJECTIVE BOX
PATRICIO WELDON  53y, Female  Allergy: No Known Allergies      CHIEF COMPLAINT:   Chest pain (28 Jun 2023 08:33)      LOS  2d    HPI  HPI:  51 yr old female with PMHx APLS, hx ofr stroke in 1992 with residual left sided weakness (on eliquis 5 mg BID), undifferentiated CTD, HbSC, hx of PE (on eliquis) presents presenting from rehab facility with chest pain, SOB, cough, headache since Saturday. History goes back to 03/23 where the patient felt weak and had abdominal pain and was admitted to [facility] turned out to have low Hb of 6.3. Patient was then discharged to [facility] with no clear diagnosis. 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 She saw again Dr. Gutierrez, who increased the Mycophenolate to 1500 mg BID. On Saturday patient woke up with shortness of breath and chest tightness, and back pain. Pain is not exacerbated by exertion. Chest is tender to palpation and pain is more pronounced on deep inspiration. Patient reported severe SOB since saturday. Patient was supposed to be finishing rehab tomorrow and the Rehab center sent her to the ED. Patient denies any urinary symptoms, no palpitations, denies any GI symptoms except an episode of loose stools today morning. Patient was started on prednsione taper on Satur 30 mg PO OD and is now on 10 mg PO OD. Patient is on 2L NC. Patient examination is normal with no crackles, wheezing, murmur, rub, no sign of DVT.     Labs noticeable for WBC of 11.55, Hb 8.9, D-dimer 377, CRP 39, A mildly positive WBC 8   CT chest Angio showed Bibasilar dependent subsegmental areas of atelectasis and/or scarring.  Limited Echo in the ED showed: Small Pericardial Effusion, Decreased EF, RV dilation  EKG: NSR     VS insignificant,   Vital Signs Last 24 Hrs  T(C): 35.8 (27 Jun 2023 16:10), Max: 37.1 (27 Jun 2023 13:54)  T(F): 96.4 (27 Jun 2023 16:10), Max: 98.7 (27 Jun 2023 13:54)  HR: 92 (27 Jun 2023 16:10) (92 - 97)  BP: 102/55 (27 Jun 2023 16:10) (102/55 - 123/63)  RR: 16 (27 Jun 2023 16:10) (16 - 16)  SpO2: 97% (27 Jun 2023 16:10) (97% - 98%)    O2 Parameters below as of 27 Jun 2023 16:10  Patient On (Oxygen Delivery Method): nasal cannula  O2 Flow (L/min): 2       (27 Jun 2023 23:19)      INFECTIOUS DISEASE HISTORY:  ID consulted for COVID    Currently ordered for:  remdesivir  IVPB   IV Intermittent   remdesivir  IVPB 100 milliGRAM(s) IV Intermittent every 24 hours      PMH  PAST MEDICAL & SURGICAL HISTORY:  Lupus      Sickle cell disease      Stroke      Pulmonary embolism      S/P cholecystectomy          FAMILY HISTORY  No pertinent family history in first degree relatives        SOCIAL HISTORY  Social History:  Does not use tobacco products, consume alcohol or partake in illicit drug use (05 Sep 2021 21:41)        ROS  ***    VITALS:  T(F): 98.2, Max: 99.2 (06-28-23 @ 23:52)  HR: 100  BP: 122/59  RR: 18Vital Signs Last 24 Hrs  T(C): 36.8 (29 Jun 2023 07:57), Max: 37.3 (28 Jun 2023 23:52)  T(F): 98.2 (29 Jun 2023 07:57), Max: 99.2 (28 Jun 2023 23:52)  HR: 100 (29 Jun 2023 07:57) (86 - 100)  BP: 122/59 (29 Jun 2023 07:57) (106/53 - 122/59)  BP(mean): 85 (29 Jun 2023 07:57) (85 - 85)  RR: 18 (29 Jun 2023 07:57) (18 - 18)  SpO2: 98% (29 Jun 2023 07:57) (98% - 99%)    Parameters below as of 29 Jun 2023 07:57  Patient On (Oxygen Delivery Method): room air        PHYSICAL EXAM:  ***    TESTS & MEASUREMENTS:                        8.3    7.44  )-----------( 372      ( 29 Jun 2023 04:30 )             22.6     06-29    142  |  108  |  11  ----------------------------<  101<H>  3.8   |  23  |  0.7    Ca    9.0      29 Jun 2023 04:30  Phos  3.5     06-28  Mg     1.8     06-29    TPro  6.4  /  Alb  3.7  /  TBili  0.4  /  DBili  <0.2  /  AST  12  /  ALT  14  /  AlkPhos  91  06-28      LIVER FUNCTIONS - ( 28 Jun 2023 17:39 )  Alb: 3.7 g/dL / Pro: 6.4 g/dL / ALK PHOS: 91 U/L / ALT: 14 U/L / AST: 12 U/L / GGT: x           Urinalysis Basic - ( 29 Jun 2023 04:30 )    Color: x / Appearance: x / SG: x / pH: x  Gluc: 101 mg/dL / Ketone: x  / Bili: x / Urobili: x   Blood: x / Protein: x / Nitrite: x   Leuk Esterase: x / RBC: x / WBC x   Sq Epi: x / Non Sq Epi: x / Bacteria: x          Lactate, Blood: 1.6 mmol/L (06-27-23 @ 16:34)  Blood Gas Venous - Lactate: 1.50 mmol/L (06-27-23 @ 16:24)      INFECTIOUS DISEASES TESTING      INFLAMMATORY MARKERS  C-Reactive Protein, Serum: 39.3 mg/L (06-27-23 @ 18:43)      RADIOLOGY & ADDITIONAL TESTS:  I have personally reviewed the last Chest xray  CXR      CT  CT Angio Chest PE Protocol w/ IV Cont:   ACC: [id number] EXAM:  CT ANGIO CHEST PULM ART WAWIC   ORDERED BY:   SIRENA BELLAMY     PROCEDURE DATE:  06/27/2023          INTERPRETATION:  CLINICAL HISTORY / REASON FOR EXAM: Chest pain.    TECHNIQUE: Multislice helical sections were obtained fromthe thoracic   inlet to the lung bases during rapid administration of 80 cc Omnipaque   350 intravenous contrast using a CTA protocol. Thin sections were   reconstructed through the pulmonary vasculature. Sagittal and coronal   reformatted images were acquired, as well as MIP reconstructed images.    COMPARISON: 6/9/2015    FINDINGS:  Pulmonary embolus: No CT evidence of acute pulmonary embolus.    Lungs/Airways/Pleura: Azygos fissure noted. Central tracheal bronchial   airways are patent. No acute lobar consolidation. No pleural effusion or   pneumothorax. Bibasilar dependent subsegmental areas of atelectasis   and/or scarring. No pleural effusion or pneumothorax    Heart/Vascular: No pericardial effusion.    Mediastinum/Lymph nodes: No lymphadenopathy identified    Visualized upper abdomen: Surgically absent gallbladder    Bones/soft tissues: Mild degenerative change along the vertebral column    IMPRESSION:  1.  No evidence of acute pulmonary embolus or acute thoracic pathology.    ---End of Report ---            WALLACE HAMILTON MD; Attending Radiologist  This document has been electronically signed. Jun 27 2023  8:08PM (06-27-23 @ 19:30)      CARDIOLOGY TESTING  12 Lead ECG:   Ventricular Rate 90 BPM    Atrial Rate 90 BPM    P-R Interval 116 ms    QRS Duration 90 ms    Q-T Interval 364 ms    QTC Calculation(Bazett) 445 ms    P Axis 78 degrees    R Axis 65 degrees    T Axis 47 degrees    Diagnosis Line Normal sinus rhythm  Nonspecific T wave abnormality  Abnormal ECG    Confirmed by ROBERT HERNÁNDEZ MD ([id number]) on 6/28/2023 6:54:24 AM (06-27-23 @ 15:49)      MEDICATIONS  apixaban 5 Oral every 12 hours  folic acid 1 Oral daily  ibuprofen  Tablet. 600 Oral three times a day  mycophenolate mofetil 1500 Oral two times a day  pantoprazole    Tablet 40 Oral before breakfast  predniSONE   Tablet 10 Oral daily  remdesivir  IVPB  IV Intermittent   remdesivir  IVPB 100 IV Intermittent every 24 hours        ANTIBIOTICS:  remdesivir  IVPB   IV Intermittent   remdesivir  IVPB 100 milliGRAM(s) IV Intermittent every 24 hours      ALLERGIES:  No Known Allergies         PATRICIO WELDON  53y, Female  Allergy: No Known Allergies      CHIEF COMPLAINT:   Chest pain (28 Jun 2023 08:33)      LOS  2d    HPI  HPI:  51 yr old female with PMHx APLS, hx ofr stroke in 1992 with residual left sided weakness (on eliquis 5 mg BID), undifferentiated CTD, HbSC, hx of PE (on eliquis) presents presenting from rehab facility with chest pain, SOB, cough, headache since Saturday. History goes back to 03/23 where the patient felt weak and had abdominal pain and was admitted to [facility] turned out to have low Hb of 6.3. Patient was then discharged to [facility] with no clear diagnosis. 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 She saw again Dr. Gutierrez, who increased the Mycophenolate to 1500 mg BID. On Saturday patient woke up with shortness of breath and chest tightness, and back pain. Pain is not exacerbated by exertion. Chest is tender to palpation and pain is more pronounced on deep inspiration. Patient reported severe SOB since saturday. Patient was supposed to be finishing rehab tomorrow and the Rehab center sent her to the ED. Patient denies any urinary symptoms, no palpitations, denies any GI symptoms except an episode of loose stools today morning. Patient was started on prednsione taper on Satur 30 mg PO OD and is now on 10 mg PO OD. Patient is on 2L NC. Patient examination is normal with no crackles, wheezing, murmur, rub, no sign of DVT.     Labs noticeable for WBC of 11.55, Hb 8.9, D-dimer 377, CRP 39, A mildly positive WBC 8   CT chest Angio showed Bibasilar dependent subsegmental areas of atelectasis and/or scarring.  Limited Echo in the ED showed: Small Pericardial Effusion, Decreased EF, RV dilation  EKG: NSR     VS insignificant,   Vital Signs Last 24 Hrs  T(C): 35.8 (27 Jun 2023 16:10), Max: 37.1 (27 Jun 2023 13:54)  T(F): 96.4 (27 Jun 2023 16:10), Max: 98.7 (27 Jun 2023 13:54)  HR: 92 (27 Jun 2023 16:10) (92 - 97)  BP: 102/55 (27 Jun 2023 16:10) (102/55 - 123/63)  RR: 16 (27 Jun 2023 16:10) (16 - 16)  SpO2: 97% (27 Jun 2023 16:10) (97% - 98%)    O2 Parameters below as of 27 Jun 2023 16:10  Patient On (Oxygen Delivery Method): nasal cannula  O2 Flow (L/min): 2       (27 Jun 2023 23:19)      INFECTIOUS DISEASE HISTORY:  ID consulted for COVID  Reports not feeling well since last Saturday, some rhinorrhea, cough   Now admitted with CP     Currently ordered for:  remdesivir  IVPB   IV Intermittent   remdesivir  IVPB 100 milliGRAM(s) IV Intermittent every 24 hours      PMH  PAST MEDICAL & SURGICAL HISTORY:  Lupus      Sickle cell disease      Stroke      Pulmonary embolism      S/P cholecystectomy          FAMILY HISTORY  No pertinent family history in first degree relatives        SOCIAL HISTORY  Social History:  Does not use tobacco products, consume alcohol or partake in illicit drug use (05 Sep 2021 21:41)        ROS  General: Denies rigors, nightsweats  HEENT: as noted above   CV: as noted above   PULM: Denies wheezing, hemoptysis  GI: Denies hematemesis, hematochezia, melena  : Denies discharge, hematuria  MSK: Denies arthralgias, myalgias  SKIN: Denies rash, lesions  NEURO: Denies paresthesias, weakness  PSYCH: Denies depression, anxiety     VITALS:  T(F): 98.2, Max: 99.2 (06-28-23 @ 23:52)  HR: 100  BP: 122/59  RR: 18Vital Signs Last 24 Hrs  T(C): 36.8 (29 Jun 2023 07:57), Max: 37.3 (28 Jun 2023 23:52)  T(F): 98.2 (29 Jun 2023 07:57), Max: 99.2 (28 Jun 2023 23:52)  HR: 100 (29 Jun 2023 07:57) (86 - 100)  BP: 122/59 (29 Jun 2023 07:57) (106/53 - 122/59)  BP(mean): 85 (29 Jun 2023 07:57) (85 - 85)  RR: 18 (29 Jun 2023 07:57) (18 - 18)  SpO2: 98% (29 Jun 2023 07:57) (98% - 99%)    Parameters below as of 29 Jun 2023 07:57  Patient On (Oxygen Delivery Method): room air        PHYSICAL EXAM:  Gen: NAD, resting in bed  HEENT: Normocephalic, atraumatic  Neck: supple, no lymphadenopathy  CV: Regular rate & regular rhythm  Lungs: decreased BS at bases, no fremitus  Abdomen: Soft, BS present  Ext: Warm, well perfused  Neuro: non focal, awake  Skin: no rash, no erythema  Lines: no phlebitis     TESTS & MEASUREMENTS:                        8.3    7.44  )-----------( 372      ( 29 Jun 2023 04:30 )             22.6     06-29    142  |  108  |  11  ----------------------------<  101<H>  3.8   |  23  |  0.7    Ca    9.0      29 Jun 2023 04:30  Phos  3.5     06-28  Mg     1.8     06-29    TPro  6.4  /  Alb  3.7  /  TBili  0.4  /  DBili  <0.2  /  AST  12  /  ALT  14  /  AlkPhos  91  06-28      LIVER FUNCTIONS - ( 28 Jun 2023 17:39 )  Alb: 3.7 g/dL / Pro: 6.4 g/dL / ALK PHOS: 91 U/L / ALT: 14 U/L / AST: 12 U/L / GGT: x           Urinalysis Basic - ( 29 Jun 2023 04:30 )    Color: x / Appearance: x / SG: x / pH: x  Gluc: 101 mg/dL / Ketone: x  / Bili: x / Urobili: x   Blood: x / Protein: x / Nitrite: x   Leuk Esterase: x / RBC: x / WBC x   Sq Epi: x / Non Sq Epi: x / Bacteria: x          Lactate, Blood: 1.6 mmol/L (06-27-23 @ 16:34)  Blood Gas Venous - Lactate: 1.50 mmol/L (06-27-23 @ 16:24)      INFECTIOUS DISEASES TESTING      INFLAMMATORY MARKERS  C-Reactive Protein, Serum: 39.3 mg/L (06-27-23 @ 18:43)      RADIOLOGY & ADDITIONAL TESTS:  I have personally reviewed the last Chest xray  CXR      CT  CT Angio Chest PE Protocol w/ IV Cont:   ACC: [id number] EXAM:  CT ANGIO CHEST PULM ART WAWIC   ORDERED BY:   SIRENA BELLAMY     PROCEDURE DATE:  06/27/2023          INTERPRETATION:  CLINICAL HISTORY / REASON FOR EXAM: Chest pain.    TECHNIQUE: Multislice helical sections were obtained fromthe thoracic   inlet to the lung bases during rapid administration of 80 cc Omnipaque   350 intravenous contrast using a CTA protocol. Thin sections were   reconstructed through the pulmonary vasculature. Sagittal and coronal   reformatted images were acquired, as well as MIP reconstructed images.    COMPARISON: 6/9/2015    FINDINGS:  Pulmonary embolus: No CT evidence of acute pulmonary embolus.    Lungs/Airways/Pleura: Azygos fissure noted. Central tracheal bronchial   airways are patent. No acute lobar consolidation. No pleural effusion or   pneumothorax. Bibasilar dependent subsegmental areas of atelectasis   and/or scarring. No pleural effusion or pneumothorax    Heart/Vascular: No pericardial effusion.    Mediastinum/Lymph nodes: No lymphadenopathy identified    Visualized upper abdomen: Surgically absent gallbladder    Bones/soft tissues: Mild degenerative change along the vertebral column    IMPRESSION:  1.  No evidence of acute pulmonary embolus or acute thoracic pathology.    ---End of Report ---            WALLACE HAMILTON MD; Attending Radiologist  This document has been electronically signed. Jun 27 2023  8:08PM (06-27-23 @ 19:30)      CARDIOLOGY TESTING  12 Lead ECG:   Ventricular Rate 90 BPM    Atrial Rate 90 BPM    P-R Interval 116 ms    QRS Duration 90 ms    Q-T Interval 364 ms    QTC Calculation(Bazett) 445 ms    P Axis 78 degrees    R Axis 65 degrees    T Axis 47 degrees    Diagnosis Line Normal sinus rhythm  Nonspecific T wave abnormality  Abnormal ECG    Confirmed by ROBERT HERNÁNDEZ MD ([id number]) on 6/28/2023 6:54:24 AM (06-27-23 @ 15:49)      MEDICATIONS  apixaban 5 Oral every 12 hours  folic acid 1 Oral daily  ibuprofen  Tablet. 600 Oral three times a day  mycophenolate mofetil 1500 Oral two times a day  pantoprazole    Tablet 40 Oral before breakfast  predniSONE   Tablet 10 Oral daily  remdesivir  IVPB  IV Intermittent   remdesivir  IVPB 100 IV Intermittent every 24 hours        ANTIBIOTICS:  remdesivir  IVPB   IV Intermittent   remdesivir  IVPB 100 milliGRAM(s) IV Intermittent every 24 hours      ALLERGIES:  No Known Allergies

## 2023-06-29 NOTE — PROGRESS NOTE ADULT - SUBJECTIVE AND OBJECTIVE BOX
MEDICINE ATTENDING PROGRESS NOTE      Interval/Overnight events:        ROS:   General: denies fever, chills, insomnia, depression, weight change  Skin: denies itch, jaundice   Resp: denies cough, pleuritic chest pain, wheezing   CV: denies PND  GI: denies N/V/D constipation   : denies d/c, itching, hematuria, dysuria   EXT: denies pain, swelling, erythema   Musculoskeletal: denies low back pain, joint pain, swelling   Neuro: denies headache, weakness, syncope    MEDICATIONS  (STANDING):  apixaban 5 milliGRAM(s) Oral every 12 hours  folic acid 1 milliGRAM(s) Oral daily  ibuprofen  Tablet. 600 milliGRAM(s) Oral three times a day  mycophenolate mofetil 1500 milliGRAM(s) Oral two times a day  pantoprazole    Tablet 40 milliGRAM(s) Oral before breakfast  predniSONE   Tablet 10 milliGRAM(s) Oral daily  remdesivir  IVPB   IV Intermittent   remdesivir  IVPB 100 milliGRAM(s) IV Intermittent every 24 hours    MEDICATIONS  (PRN):      VITALS  T(F): 98.2 (06-29-23 @ 07:57), Max: 99.2 (06-28-23 @ 23:52)  HR: 100 (06-29-23 @ 07:57) (75 - 100)  BP: 122/59 (06-29-23 @ 07:57) (99/55 - 122/59)  RR: 18 (06-29-23 @ 07:57) (18 - 18)  SpO2: 98% (06-29-23 @ 07:57) (98% - 99%)              PHYSICAL EXAM  Gen- NAD, AAOx3  HEENT- no pallor, anicteric, moist mucous membranes  CVS- S1/S2, no m/r/g  Chest- CTA b/l no w/r/c, no use of accessory muscles, good inspiratory effort, speaking in full sentences  Abd- soft, nt, nd  Ext- no edema, pulses intact b/l  Neuro-CN II-XII intact;    LABS/IMAGING  06-28    x   |  x   |  x   ----------------------------<  x   x    |  x   |  0.6<L>    Ca    9.0      28 Jun 2023 07:21  Phos  3.5     06-28  Mg     1.8     06-28    TPro  6.4  /  Alb  3.7  /  TBili  0.4  /  DBili  <0.2  /  AST  12  /  ALT  14  /  AlkPhos  91  06-28    LIVER FUNCTIONS - ( 28 Jun 2023 17:39 )  Alb: 3.7 g/dL / Pro: 6.4 g/dL / ALK PHOS: 91 U/L / ALT: 14 U/L / AST: 12 U/L / GGT: x                                 8.4    8.80  )-----------( 361      ( 28 Jun 2023 17:39 )             23.3           Image(s) Available  --  --  --    CARDIAC MARKERS ( 28 Jun 2023 20:54 )  x     / <0.01 ng/mL / x     / x     / x      CARDIAC MARKERS ( 28 Jun 2023 17:39 )  x     / <0.01 ng/mL / x     / x     / x      CARDIAC MARKERS ( 27 Jun 2023 16:34 )  x     / <0.01 ng/mL / x     / x     / x            MICROBIOLOGY      IMAGE      Image(s) Available  --  --  --        A/P:      #SUPPORTIVE MANAGEMENT/DISPO  - Dispo:   - DVT Ppx:   - GI Ppx:   - Diet    Spent over 35 min reviewing chart and on coordinating patient care during interdisciplinary rounds     Olman Juan M.D./Jerry  Attending Physician  MEDICINE ATTENDING PROGRESS NOTE      Interval/Overnight events:    CP still persists  CP with palpation, SOB improved on oxygen (now oxygen), HA now with COVID+  f/u 2D echo  f/u YAJAIRA and dsDNA  EKG NSR  cont Tylenol  and steroid  for CP  cont remdservir, decadron 6mg as per COVID   If SOB, could do nebulizers PULMICORT, standing albuterol, Nacl 0.9 Neb)          ROS:   General: denies fever, chills, insomnia, depression, weight change  Skin: denies itch, jaundice   Resp: denies cough, pleuritic chest pain, wheezing   CV: denies PND  GI: denies N/V/D constipation   : denies d/c, itching, hematuria, dysuria   EXT: denies pain, swelling, erythema   Musculoskeletal: denies low back pain, joint pain, swelling   Neuro: denies headache, weakness, syncope    MEDICATIONS  (STANDING):  apixaban 5 milliGRAM(s) Oral every 12 hours  folic acid 1 milliGRAM(s) Oral daily  ibuprofen  Tablet. 600 milliGRAM(s) Oral three times a day  mycophenolate mofetil 1500 milliGRAM(s) Oral two times a day  pantoprazole    Tablet 40 milliGRAM(s) Oral before breakfast  predniSONE   Tablet 10 milliGRAM(s) Oral daily  remdesivir  IVPB   IV Intermittent   remdesivir  IVPB 100 milliGRAM(s) IV Intermittent every 24 hours    MEDICATIONS  (PRN):      VITALS  T(F): 98.2 (06-29-23 @ 07:57), Max: 99.2 (06-28-23 @ 23:52)  HR: 100 (06-29-23 @ 07:57) (75 - 100)  BP: 122/59 (06-29-23 @ 07:57) (99/55 - 122/59)  RR: 18 (06-29-23 @ 07:57) (18 - 18)  SpO2: 98% (06-29-23 @ 07:57) (98% - 99%)              PHYSICAL EXAM  Gen- NAD, AAOx3  HEENT- no pallor, anicteric, moist mucous membranes  CVS- S1/S2, no m/r/g  Chest- CTA b/l no w/r/c, no use of accessory muscles, good inspiratory effort, speaking in full sentences  Abd- soft, nt, nd  Ext- no edema, pulses intact b/l  Neuro-CN II-XII intact;    LABS/IMAGING  06-28    x   |  x   |  x   ----------------------------<  x   x    |  x   |  0.6<L>    Ca    9.0      28 Jun 2023 07:21  Phos  3.5     06-28  Mg     1.8     06-28    TPro  6.4  /  Alb  3.7  /  TBili  0.4  /  DBili  <0.2  /  AST  12  /  ALT  14  /  AlkPhos  91  06-28    LIVER FUNCTIONS - ( 28 Jun 2023 17:39 )  Alb: 3.7 g/dL / Pro: 6.4 g/dL / ALK PHOS: 91 U/L / ALT: 14 U/L / AST: 12 U/L / GGT: x                                 8.4    8.80  )-----------( 361      ( 28 Jun 2023 17:39 )             23.3           Image(s) Available  --  --  --    CARDIAC MARKERS ( 28 Jun 2023 20:54 )  x     / <0.01 ng/mL / x     / x     / x      CARDIAC MARKERS ( 28 Jun 2023 17:39 )  x     / <0.01 ng/mL / x     / x     / x      CARDIAC MARKERS ( 27 Jun 2023 16:34 )  x     / <0.01 ng/mL / x     / x     / x            MICROBIOLOGY      IMAGE      Image(s) Available  --  --  --        A/P:    #CP likely Pleuritic CP, less likely Viral pericarditis  EKG: wnl    #SUPPORTIVE MANAGEMENT/DISPO  - Dispo:   - DVT Ppx:   - GI Ppx:   - Diet    Spent over 35 min reviewing chart and on coordinating patient care during interdisciplinary rounds     Olman Juan M.D./Jerry  Attending Physician  MEDICINE ATTENDING PROGRESS NOTE      Interval/Overnight events:    CP still persists  CP with palpation, SOB improved on oxygen (now oxygen), HA now with COVID+  f/u 2D echo  f/u YAJAIRA and dsDNA  EKG NSR  cont Tylenol  and steroid  for CP  cont remdservir, decadron 6mg as per COVID   If SOB, could do nebulizers PULMICORT, standing albuterol, Nacl 0.9 Neb)  Will get CTA Chest to r/o PE          ROS:   General: denies fever, chills, insomnia, depression, weight change  Skin: denies itch, jaundice   Resp: denies cough, pleuritic chest pain, wheezing   CV: denies PND  GI: denies N/V/D constipation   : denies d/c, itching, hematuria, dysuria   EXT: denies pain, swelling, erythema   Musculoskeletal: denies low back pain, joint pain, swelling   Neuro: denies headache, weakness, syncope    MEDICATIONS  (STANDING):  apixaban 5 milliGRAM(s) Oral every 12 hours  folic acid 1 milliGRAM(s) Oral daily  ibuprofen  Tablet. 600 milliGRAM(s) Oral three times a day  mycophenolate mofetil 1500 milliGRAM(s) Oral two times a day  pantoprazole    Tablet 40 milliGRAM(s) Oral before breakfast  predniSONE   Tablet 10 milliGRAM(s) Oral daily  remdesivir  IVPB   IV Intermittent   remdesivir  IVPB 100 milliGRAM(s) IV Intermittent every 24 hours    MEDICATIONS  (PRN):      VITALS  T(F): 98.2 (06-29-23 @ 07:57), Max: 99.2 (06-28-23 @ 23:52)  HR: 100 (06-29-23 @ 07:57) (75 - 100)  BP: 122/59 (06-29-23 @ 07:57) (99/55 - 122/59)  RR: 18 (06-29-23 @ 07:57) (18 - 18)  SpO2: 98% (06-29-23 @ 07:57) (98% - 99%)              PHYSICAL EXAM  Gen- NAD, AAOx3  HEENT- no pallor, anicteric, moist mucous membranes  CVS- S1/S2, no m/r/g  Chest- CTA b/l no w/r/c, no use of accessory muscles, good inspiratory effort, speaking in full sentences  Abd- soft, nt, nd  Ext- no edema, pulses intact b/l  Neuro-CN II-XII intact;    LABS/IMAGING  06-28    x   |  x   |  x   ----------------------------<  x   x    |  x   |  0.6<L>    Ca    9.0      28 Jun 2023 07:21  Phos  3.5     06-28  Mg     1.8     06-28    TPro  6.4  /  Alb  3.7  /  TBili  0.4  /  DBili  <0.2  /  AST  12  /  ALT  14  /  AlkPhos  91  06-28    LIVER FUNCTIONS - ( 28 Jun 2023 17:39 )  Alb: 3.7 g/dL / Pro: 6.4 g/dL / ALK PHOS: 91 U/L / ALT: 14 U/L / AST: 12 U/L / GGT: x                                 8.4    8.80  )-----------( 361      ( 28 Jun 2023 17:39 )             23.3           Image(s) Available  --  --  --    CARDIAC MARKERS ( 28 Jun 2023 20:54 )  x     / <0.01 ng/mL / x     / x     / x      CARDIAC MARKERS ( 28 Jun 2023 17:39 )  x     / <0.01 ng/mL / x     / x     / x      CARDIAC MARKERS ( 27 Jun 2023 16:34 )  x     / <0.01 ng/mL / x     / x     / x            MICROBIOLOGY      IMAGE      Image(s) Available  --  --  --        A/P:    #CP likely Pleuritic CP, less likely Viral pericarditis  EKG: wnl    #SUPPORTIVE MANAGEMENT/DISPO  - Dispo:   - DVT Ppx:   - GI Ppx:   - Diet    Spent over 35 min reviewing chart and on coordinating patient care during interdisciplinary rounds     Olman Juan M.D./Jerry  Attending Physician  MEDICINE ATTENDING PROGRESS NOTE      Interval/Overnight events:    CP still persists  CP with palpation, SOB improved on oxygen (now oxygen), HA now with COVID+  f/u 2D echo  f/u YAJAIRA and dsDNA  EKG NSR  cont Tylenol  and steroid  for CP  cont remdservir, decadron 6mg as per COVID   If SOB, could do nebulizers PULMICORT, standing albuterol, Nacl 0.9 Neb)  CTA Chest ruled out PE          ROS:   General: denies fever, chills, insomnia, depression, weight change  Skin: denies itch, jaundice   Resp: denies cough, pleuritic chest pain, wheezing   CV: denies PND  GI: denies N/V/D constipation   : denies d/c, itching, hematuria, dysuria   EXT: denies pain, swelling, erythema   Musculoskeletal: denies low back pain, joint pain, swelling   Neuro: denies headache, weakness, syncope    MEDICATIONS  (STANDING):  apixaban 5 milliGRAM(s) Oral every 12 hours  folic acid 1 milliGRAM(s) Oral daily  ibuprofen  Tablet. 600 milliGRAM(s) Oral three times a day  mycophenolate mofetil 1500 milliGRAM(s) Oral two times a day  pantoprazole    Tablet 40 milliGRAM(s) Oral before breakfast  predniSONE   Tablet 10 milliGRAM(s) Oral daily  remdesivir  IVPB   IV Intermittent   remdesivir  IVPB 100 milliGRAM(s) IV Intermittent every 24 hours    MEDICATIONS  (PRN):      VITALS  T(F): 98.2 (06-29-23 @ 07:57), Max: 99.2 (06-28-23 @ 23:52)  HR: 100 (06-29-23 @ 07:57) (75 - 100)  BP: 122/59 (06-29-23 @ 07:57) (99/55 - 122/59)  RR: 18 (06-29-23 @ 07:57) (18 - 18)  SpO2: 98% (06-29-23 @ 07:57) (98% - 99%)              PHYSICAL EXAM  Gen- NAD, AAOx3  HEENT- no pallor, anicteric, moist mucous membranes  CVS- S1/S2, no m/r/g  Chest- CTA b/l no w/r/c, no use of accessory muscles, good inspiratory effort, speaking in full sentences  Abd- soft, nt, nd  Ext- no edema, pulses intact b/l  Neuro-CN II-XII intact;    LABS/IMAGING  06-28    x   |  x   |  x   ----------------------------<  x   x    |  x   |  0.6<L>    Ca    9.0      28 Jun 2023 07:21  Phos  3.5     06-28  Mg     1.8     06-28    TPro  6.4  /  Alb  3.7  /  TBili  0.4  /  DBili  <0.2  /  AST  12  /  ALT  14  /  AlkPhos  91  06-28    LIVER FUNCTIONS - ( 28 Jun 2023 17:39 )  Alb: 3.7 g/dL / Pro: 6.4 g/dL / ALK PHOS: 91 U/L / ALT: 14 U/L / AST: 12 U/L / GGT: x                                 8.4    8.80  )-----------( 361      ( 28 Jun 2023 17:39 )             23.3           Image(s) Available  --  --  --    CARDIAC MARKERS ( 28 Jun 2023 20:54 )  x     / <0.01 ng/mL / x     / x     / x      CARDIAC MARKERS ( 28 Jun 2023 17:39 )  x     / <0.01 ng/mL / x     / x     / x      CARDIAC MARKERS ( 27 Jun 2023 16:34 )  x     / <0.01 ng/mL / x     / x     / x            MICROBIOLOGY      IMAGE      Image(s) Available  --  --  --        A/P:    #CP likely Pleuritic CP, less likely Viral pericarditis  EKG: wnl    #SUPPORTIVE MANAGEMENT/DISPO  - Dispo:   - DVT Ppx:   - GI Ppx:   - Diet    Spent over 35 min reviewing chart and on coordinating patient care during interdisciplinary rounds     Olman Juan M.D./Jerry  Attending Physician  MEDICINE ATTENDING PROGRESS NOTE  51 yr old female with PMHx APLS, hx ofr stroke in 1992 with residual left sided weakness (on eliquis 5 mg BID), undifferentiated CTD, HbSC, hx of PE (on eliquis) presents presenting from rehab facility with chest pain, SOB, cough, headache. Found to have COVID+. Admitted for further management    Interval/Overnight events:  CP with palpation, SOB improved on oxygen (now off oxygen), HA now found with COVID+  CP still persists  f/u 2D echo  f/u YAJAIRA and dsDNA  EKG NSR  cont Tylenol  and steroid  for CP  cont remdservir, decadron 6mg as per COVID   If SOB, could do nebulizers PULMICORT, standing albuterol, Nacl 0.9 Neb)  CTA Chest ruled out PE    ROS:   General: denies fever, chills, insomnia, depression, weight change  Skin: denies itch, jaundice   Resp: denies cough, pleuritic chest pain, wheezing   CV: denies PND  GI: denies N/V/D constipation   : denies d/c, itching, hematuria, dysuria   EXT: denies pain, swelling, erythema   Musculoskeletal: denies low back pain, joint pain, swelling   Neuro: denies headache, weakness, syncope    MEDICATIONS  (STANDING):  apixaban 5 milliGRAM(s) Oral every 12 hours  folic acid 1 milliGRAM(s) Oral daily  ibuprofen  Tablet. 600 milliGRAM(s) Oral three times a day  mycophenolate mofetil 1500 milliGRAM(s) Oral two times a day  pantoprazole    Tablet 40 milliGRAM(s) Oral before breakfast  predniSONE   Tablet 10 milliGRAM(s) Oral daily  remdesivir  IVPB   IV Intermittent   remdesivir  IVPB 100 milliGRAM(s) IV Intermittent every 24 hours    MEDICATIONS  (PRN):    VITALS  T(F): 98.2 (06-29-23 @ 07:57), Max: 99.2 (06-28-23 @ 23:52)  HR: 100 (06-29-23 @ 07:57) (75 - 100)  BP: 122/59 (06-29-23 @ 07:57) (99/55 - 122/59)  RR: 18 (06-29-23 @ 07:57) (18 - 18)  SpO2: 98% (06-29-23 @ 07:57) (98% - 99%)    PHYSICAL EXAM  Gen- NAD, AAOx3  HEENT- no pallor, anicteric, moist mucous membranes  CVS- S1/S2, no m/r/g  Chest- CTA b/l no w/r/c, no use of accessory muscles, good inspiratory effort, speaking in full sentences  Abd- soft, nt, nd  Ext- no edema, pulses intact b/l  Neuro-CN II-XII intact;    LABS/IMAGING  06-28    x   |  x   |  x   ----------------------------<  x   x    |  x   |  0.6<L>    Ca    9.0      28 Jun 2023 07:21  Phos  3.5     06-28  Mg     1.8     06-28    TPro  6.4  /  Alb  3.7  /  TBili  0.4  /  DBili  <0.2  /  AST  12  /  ALT  14  /  AlkPhos  91  06-28    LIVER FUNCTIONS - ( 28 Jun 2023 17:39 )  Alb: 3.7 g/dL / Pro: 6.4 g/dL / ALK PHOS: 91 U/L / ALT: 14 U/L / AST: 12 U/L / GGT: x                                 8.4    8.80  )-----------( 361      ( 28 Jun 2023 17:39 )             23.3       CARDIAC MARKERS ( 28 Jun 2023 20:54 )  x     / <0.01 ng/mL / x     / x     / x      CARDIAC MARKERS ( 28 Jun 2023 17:39 )  x     / <0.01 ng/mL / x     / x     / x      CARDIAC MARKERS ( 27 Jun 2023 16:34 )  x     / <0.01 ng/mL / x     / x     / x          MICROBIOLOGY  COVID +     IMAGE  Xray Chest 1 View- PORTABLE-Routine (06.28.23 @ 08:54)  No focal consolidation, pneumothorax or pleural effusion.  Stable cardiomediastinal silhouette.  Unchanged osseous structures.    CT Angio Chest PE Protocol w/ IV Cont (06.27.23 @ 19:30)    No evidence of acute pulmonary embolus or acute thoracic pathology.      A/P: 51 yr old female with PMHx APLS, hx ofr stroke in 1992 with residual left sided weakness (on eliquis 5 mg BID), undifferentiated CTD, HbSC, hx of PE (on eliquis) presents presenting from rehab facility with chest pain, SOB, cough, headache. Found to have COVID+. Admitted for further management    #SOB, cough, headache due to COVID  #Pleuritic Chest Pain likely due to Costochondritis in setting of COVID Pneumonia, improving  - PE ruled out, less likely Viral pericarditis  - YAJAIRA neg, ds-DNA neg  - RVP: COVID+  - CT chest Angio showed Bibasilar dependent subsegmental areas of atelectasis and/or scarring.  - Echocardio:pending   - EKG: NSR  - symptoms improving with steroid  - cont decadron, remdeservir  - Cont MFF 1500mg po bid   - c/w ibuprofen 600mg po tid   - Cardio consult:pending   - ID consult :pending   - PT eval:pending    #Normocytic anemia slightly worse than baseline HbSC/ Hx of stroke /hx of PE   * no signs of bleeding   - cw cellcept and eliquis   - Repeat hb     #stroke in 1992 with residual left sided weakness (on eliquis 5 mg BID), undifferentiated CTD, HbSC, hx of PE (on eliquis)   - resume home meds    #DVT/GI px     Spent over 35 min reviewing chart and on coordinating patient care during interdisciplinary rounds     Olman Juan M.D./Lelo.  Attending Physician

## 2023-06-29 NOTE — CONSULT NOTE ADULT - ASSESSMENT
ASSESSMENT  51 yr old female with PMHx APLS, hx ofr stroke in 1992 with residual left sided weakness (on eliquis 5 mg BID), undifferentiated CTD, HbSC, hx of PE (on eliquis) presents presenting from rehab facility with chest pain, SOB, cough, headache since Saturday. History goes back to 03/23 where the patient felt weak and had abdominal pain and was admitted to [facility] turned out to have low Hb of 6.3. Patient was then discharged to [place] rehab with no clear diagnosis. 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 She saw again Dr. Gutierrez, who increased the Mycophenolate to 1500 mg BID. On Saturday patient woke up with shortness of breath and chest tightness, and back pain.       IMPRESSION  #  #COVID19, not requiring supplemental O2     CTA No evidence of acute pulmonary embolus or acute thoracic pathology.  #Sepsis ruled out on admission   #Undifferentiated CTD  #APLS  #Atelectasis  #Abx allergy: No Known Allergies    Creatinine: 0.7 (06-29-23 @ 04:30)    Height (cm): 165.1 (06-27-23 @ 13:54)  Weight (kg): 57.2 (06-27-23 @ 13:54)    RECOMMENDATIONS  This is an incomplete consult note. All final recommendations to follow after interview and examination of the patient. Please follow recommendations noted below.  - RDV x 3 days     If any questions, please send a message or call on [employer]  Please continue to update ID with any pertinent new laboratory or radiographic findings  #[id number]   ASSESSMENT  51 yr old female with PMHx APLS, hx ofr stroke in 1992 with residual left sided weakness (on eliquis 5 mg BID), undifferentiated CTD, HbSC, hx of PE (on eliquis) presents presenting from rehab facility with chest pain, SOB, cough, headache since Saturday. History goes back to 03/23 where the patient felt weak and had abdominal pain and [facility] turned out to have low Hb of 6.3. Patient was then discharged to [place] rehab with no clear diagnosis. 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 She saw again Dr. Gutierrez, who increased the Mycophenolate to 1500 mg BID. On Saturday patient woke up with shortness of breath and chest tightness, and back pain.       IMPRESSION  #COVID19, not requiring supplemental O2     CTA No evidence of acute pulmonary embolus or acute thoracic pathology.  #CP  #Sepsis ruled out on admission   #Undifferentiated CTD  #APLS  #Atelectasis  #Abx allergy: No Known Allergies    Creatinine: 0.7 (06-29-23 @ 04:30)    Height (cm): 165.1 (06-27-23 @ 13:54)  Weight (kg): 57.2 (06-27-23 @ 13:54)    RECOMMENDATIONS  - RDV x 3 days (many drug interactions for paxlovid, also > 5 days sx)  - CP workup per primary team   - Please recall ID PRN. Please inform ID of any patient clinical change or any new pertinent laboratory or radiographic data    If any questions, please send a message or call on [employer] Teams  Please continue to update ID with any pertinent new laboratory or radiographic findings  #[id number]

## 2023-06-30 LAB
ALBUMIN SERPL ELPH-MCNC: 3.9 G/DL — SIGNIFICANT CHANGE UP (ref 3.5–5.2)
ALP SERPL-CCNC: 80 U/L — SIGNIFICANT CHANGE UP (ref 30–115)
ALT FLD-CCNC: 12 U/L — SIGNIFICANT CHANGE UP (ref 0–41)
ANTI-RIBONUCLEAR PROTEIN: 0.2 AI — SIGNIFICANT CHANGE UP
AST SERPL-CCNC: 10 U/L — SIGNIFICANT CHANGE UP (ref 0–41)
B2 GLYCOPROT1 AB SER QL: NEGATIVE — SIGNIFICANT CHANGE UP
BILIRUB DIRECT SERPL-MCNC: 0.2 MG/DL — SIGNIFICANT CHANGE UP (ref 0–0.3)
BILIRUB INDIRECT FLD-MCNC: 0.3 MG/DL — SIGNIFICANT CHANGE UP (ref 0.2–1.2)
BILIRUB SERPL-MCNC: 0.5 MG/DL — SIGNIFICANT CHANGE UP (ref 0.2–1.2)
C3 SERPL-MCNC: 135 MG/DL — SIGNIFICANT CHANGE UP (ref 81–157)
C4 SERPL-MCNC: 29 MG/DL — SIGNIFICANT CHANGE UP (ref 13–39)
CREAT SERPL-MCNC: 0.6 MG/DL — LOW (ref 0.7–1.5)
EGFR: 107 ML/MIN/1.73M2 — SIGNIFICANT CHANGE UP
ENA SM AB FLD QL: <0.2 AI — SIGNIFICANT CHANGE UP
INR BLD: 1.18 RATIO — SIGNIFICANT CHANGE UP (ref 0.65–1.3)
PROT SERPL-MCNC: 6.8 G/DL — SIGNIFICANT CHANGE UP (ref 6–8)
PROTHROM AB SERPL-ACNC: 13.5 SEC — HIGH (ref 9.95–12.87)

## 2023-06-30 PROCEDURE — 99232 SBSQ HOSP IP/OBS MODERATE 35: CPT

## 2023-06-30 RX ORDER — SODIUM CHLORIDE 9 MG/ML
1000 INJECTION, SOLUTION INTRAVENOUS
Refills: 0 | Status: DISCONTINUED | OUTPATIENT
Start: 2023-06-30 | End: 2023-07-03

## 2023-06-30 RX ORDER — COLCHICINE 0.6 MG
0.6 TABLET ORAL EVERY 12 HOURS
Refills: 0 | Status: DISCONTINUED | OUTPATIENT
Start: 2023-06-30 | End: 2023-07-06

## 2023-06-30 RX ORDER — DEXAMETHASONE 0.5 MG/5ML
6 ELIXIR ORAL DAILY
Refills: 0 | Status: DISCONTINUED | OUTPATIENT
Start: 2023-06-30 | End: 2023-07-04

## 2023-06-30 RX ORDER — OXYCODONE HYDROCHLORIDE 5 MG/1
5 TABLET ORAL EVERY 8 HOURS
Refills: 0 | Status: DISCONTINUED | OUTPATIENT
Start: 2023-06-30 | End: 2023-07-01

## 2023-06-30 RX ADMIN — PANTOPRAZOLE SODIUM 40 MILLIGRAM(S): 20 TABLET, DELAYED RELEASE ORAL at 06:07

## 2023-06-30 RX ADMIN — APIXABAN 5 MILLIGRAM(S): 2.5 TABLET, FILM COATED ORAL at 17:56

## 2023-06-30 RX ADMIN — Medication 6 MILLIGRAM(S): at 06:05

## 2023-06-30 RX ADMIN — APIXABAN 5 MILLIGRAM(S): 2.5 TABLET, FILM COATED ORAL at 06:07

## 2023-06-30 RX ADMIN — OXYCODONE HYDROCHLORIDE 2.5 MILLIGRAM(S): 5 TABLET ORAL at 03:22

## 2023-06-30 RX ADMIN — MYCOPHENOLATE MOFETIL 1500 MILLIGRAM(S): 250 CAPSULE ORAL at 06:06

## 2023-06-30 RX ADMIN — MYCOPHENOLATE MOFETIL 1500 MILLIGRAM(S): 250 CAPSULE ORAL at 17:56

## 2023-06-30 RX ADMIN — Medication 600 MILLIGRAM(S): at 22:13

## 2023-06-30 RX ADMIN — Medication 0.6 MILLIGRAM(S): at 17:56

## 2023-06-30 RX ADMIN — Medication 1 MILLIGRAM(S): at 11:58

## 2023-06-30 RX ADMIN — OXYCODONE HYDROCHLORIDE 2.5 MILLIGRAM(S): 5 TABLET ORAL at 11:47

## 2023-06-30 RX ADMIN — SODIUM CHLORIDE 50 MILLILITER(S): 9 INJECTION, SOLUTION INTRAVENOUS at 11:47

## 2023-06-30 RX ADMIN — OXYCODONE HYDROCHLORIDE 5 MILLIGRAM(S): 5 TABLET ORAL at 22:13

## 2023-06-30 RX ADMIN — Medication 600 MILLIGRAM(S): at 15:35

## 2023-06-30 RX ADMIN — REMDESIVIR 200 MILLIGRAM(S): 5 INJECTION INTRAVENOUS at 15:36

## 2023-06-30 RX ADMIN — Medication 600 MILLIGRAM(S): at 06:08

## 2023-06-30 RX ADMIN — SODIUM CHLORIDE 50 MILLILITER(S): 9 INJECTION, SOLUTION INTRAVENOUS at 22:13

## 2023-06-30 NOTE — PROGRESS NOTE ADULT - SUBJECTIVE AND OBJECTIVE BOX
MEDICINE ATTENDING PROGRESS NOTE  51 yr old female with PMHx APLS, hx ofr stroke in 1992 with residual left sided weakness (on eliquis 5 mg BID), undifferentiated CTD, HbSC, hx of PE (on eliquis) presents presenting from rehab facility with chest pain, SOB, cough, headache. Found to have COVID+. Admitted for further management    Interval/Overnight events:      ROS:   General: denies fever, chills, insomnia, depression, weight change  Skin: denies itch, jaundice   Resp: denies cough, pleuritic chest pain, wheezing   CV: denies PND  GI: denies N/V/D constipation   : denies d/c, itching, hematuria, dysuria   EXT: denies pain, swelling, erythema   Musculoskeletal: denies low back pain, joint pain, swelling   Neuro: denies headache, weakness, syncope    MEDICATIONS  (STANDING):  apixaban 5 milliGRAM(s) Oral every 12 hours  dexAMETHasone     Tablet 6 milliGRAM(s) Oral daily  folic acid 1 milliGRAM(s) Oral daily  ibuprofen  Tablet. 600 milliGRAM(s) Oral three times a day  mycophenolate mofetil 1500 milliGRAM(s) Oral two times a day  pantoprazole    Tablet 40 milliGRAM(s) Oral before breakfast  remdesivir  IVPB   IV Intermittent   remdesivir  IVPB 100 milliGRAM(s) IV Intermittent every 24 hours    MEDICATIONS  (PRN):  oxyCODONE    IR 2.5 milliGRAM(s) Oral every 8 hours PRN Severe Pain (7 - 10)      VITALS  T(F): 97.9 (06-30-23 @ 07:53), Max: 98.4 (06-29-23 @ 15:51)  HR: 78 (06-30-23 @ 07:53) (78 - 107)  BP: 116/61 (06-30-23 @ 07:53) (90/50 - 132/75)  RR: 18 (06-30-23 @ 07:53) (18 - 18)  SpO2: 98% (06-30-23 @ 07:53) (98% - 99%)        PHYSICAL EXAM  Gen- NAD, AAOx3  HEENT- no pallor, anicteric, moist mucous membranes  CVS- S1/S2, no m/r/g  Chest- CTA b/l no w/r/c, no use of accessory muscles, good inspiratory effort, speaking in full sentences  Abd- soft, nt, nd  Ext- no edema, pulses intact b/l  Neuro-CN II-XII intact;    LABS/IMAGING  06-30    x   |  x   |  x   ----------------------------<  x   x    |  x   |  0.6<L>    Ca    9.0      29 Jun 2023 04:30  Mg     1.8     06-29    TPro  6.8  /  Alb  3.9  /  TBili  0.5  /  DBili  0.2  /  AST  10  /  ALT  12  /  AlkPhos  80  06-30    LIVER FUNCTIONS - ( 30 Jun 2023 06:27 )  Alb: 3.9 g/dL / Pro: 6.8 g/dL / ALK PHOS: 80 U/L / ALT: 12 U/L / AST: 10 U/L / GGT: x                                 8.3    7.44  )-----------( 372      ( 29 Jun 2023 04:30 )             22.6             CARDIAC MARKERS ( 28 Jun 2023 20:54 )  x     / <0.01 ng/mL / x     / x     / x      CARDIAC MARKERS ( 28 Jun 2023 17:39 )  x     / <0.01 ng/mL / x     / x     / x            MICROBIOLOGY    Culture - Urine (collected 06-28-23 @ 04:40)  Source: Clean Catch Clean Catch (Midstream)  Final Report (06-29-23 @ 13:44):    <10,000 CFU/mL Normal Urogenital Mariann    Culture - Urine (collected 06-27-23 @ 18:58)  Source: Clean Catch Clean Catch (Midstream)  Final Report (06-29-23 @ 10:12):    <10,000 CFU/mL Normal Urogenital Mariann        IMAGE        CARDIAC MARKERS ( 28 Jun 2023 20:54 )  x     / <0.01 ng/mL / x     / x     / x      CARDIAC MARKERS ( 28 Jun 2023 17:39 )  x     / <0.01 ng/mL / x     / x     / x      CARDIAC MARKERS ( 27 Jun 2023 16:34 )  x     / <0.01 ng/mL / x     / x     / x          MICROBIOLOGY  COVID +     IMAGE  Xray Chest 1 View- PORTABLE-Routine (06.28.23 @ 08:54)  No focal consolidation, pneumothorax or pleural effusion.  Stable cardiomediastinal silhouette.  Unchanged osseous structures.    CT Angio Chest PE Protocol w/ IV Cont (06.27.23 @ 19:30)    No evidence of acute pulmonary embolus or acute thoracic pathology.      A/P: 51 yr old female with PMHx APLS, hx ofr stroke in 1992 with residual left sided weakness (on eliquis 5 mg BID), undifferentiated CTD, HbSC, hx of PE (on eliquis) presents presenting from rehab facility with chest pain, SOB, cough, headache. Found to have COVID+. Admitted for further management    #SOB, cough, headache due to COVID  #Pleuritic Chest Pain likely due to Costochondritis in setting of COVID Pneumonia, improving  - PE ruled out, less likely Viral pericarditis  - YAJAIRA neg, ds-DNA neg  - RVP: COVID+  - CT chest Angio showed Bibasilar dependent subsegmental areas of atelectasis and/or scarring.  - Echocardio:pending   - EKG: NSR  - symptoms improving with steroid  - cont decadron, remdeservir  - Cont MFF 1500mg po bid   - c/w ibuprofen 600mg po tid   - Cardio consult:pending   - ID consult :pending   - PT eval:pending    #Normocytic anemia slightly worse than baseline HbSC/ Hx of stroke /hx of PE   * no signs of bleeding   - cw cellcept and eliquis   - Repeat hb     #stroke in 1992 with residual left sided weakness (on eliquis 5 mg BID), undifferentiated CTD, HbSC, hx of PE (on eliquis)   - resume home meds    #DVT/GI px     Spent over 35 min reviewing chart and on coordinating patient care during interdisciplinary rounds     Olman Juan M.D./Lelo.  Attending Physician  MEDICINE ATTENDING PROGRESS NOTE  51 yr old female with PMHx APLS, hx ofr stroke in 1992 with residual left sided weakness (on eliquis 5 mg BID), undifferentiated CTD, HbSC, hx of PE (on eliquis) presents presenting from rehab facility with chest pain, SOB, cough, headache. Found to have COVID+. Admitted for further management    Interval/Overnight events:    still endorses bilateral CP and upper back pain in setting of HbSC -> will do IVF management and pain management  f/u 2D echo  trial of Colchicine given pain not improve, cont steroid and ibuprofen       ROS:   General: denies fever, chills, insomnia, depression, weight change  Skin: denies itch, jaundice   Resp: denies cough, pleuritic chest pain, wheezing   CV: denies PND  GI: denies N/V/D constipation   : denies d/c, itching, hematuria, dysuria   EXT: denies pain, swelling, erythema   Musculoskeletal: denies low back pain, joint pain, swelling   Neuro: denies headache, weakness, syncope    MEDICATIONS  (STANDING):  apixaban 5 milliGRAM(s) Oral every 12 hours  dexAMETHasone     Tablet 6 milliGRAM(s) Oral daily  folic acid 1 milliGRAM(s) Oral daily  ibuprofen  Tablet. 600 milliGRAM(s) Oral three times a day  mycophenolate mofetil 1500 milliGRAM(s) Oral two times a day  pantoprazole    Tablet 40 milliGRAM(s) Oral before breakfast  remdesivir  IVPB   IV Intermittent   remdesivir  IVPB 100 milliGRAM(s) IV Intermittent every 24 hours    MEDICATIONS  (PRN):  oxyCODONE    IR 2.5 milliGRAM(s) Oral every 8 hours PRN Severe Pain (7 - 10)      VITALS  T(F): 97.9 (06-30-23 @ 07:53), Max: 98.4 (06-29-23 @ 15:51)  HR: 78 (06-30-23 @ 07:53) (78 - 107)  BP: 116/61 (06-30-23 @ 07:53) (90/50 - 132/75)  RR: 18 (06-30-23 @ 07:53) (18 - 18)  SpO2: 98% (06-30-23 @ 07:53) (98% - 99%)        PHYSICAL EXAM  Gen- NAD, AAOx3  HEENT- no pallor, anicteric, moist mucous membranes  CVS- S1/S2, no m/r/g  Chest- CTA b/l no w/r/c, no use of accessory muscles, good inspiratory effort, speaking in full sentences  Abd- soft, nt, nd  Ext- no edema, pulses intact b/l  Neuro-CN II-XII intact;    LABS/IMAGING  06-30    x   |  x   |  x   ----------------------------<  x   x    |  x   |  0.6<L>    Ca    9.0      29 Jun 2023 04:30  Mg     1.8     06-29    TPro  6.8  /  Alb  3.9  /  TBili  0.5  /  DBili  0.2  /  AST  10  /  ALT  12  /  AlkPhos  80  06-30    LIVER FUNCTIONS - ( 30 Jun 2023 06:27 )  Alb: 3.9 g/dL / Pro: 6.8 g/dL / ALK PHOS: 80 U/L / ALT: 12 U/L / AST: 10 U/L / GGT: x                                 8.3    7.44  )-----------( 372      ( 29 Jun 2023 04:30 )             22.6             CARDIAC MARKERS ( 28 Jun 2023 20:54 )  x     / <0.01 ng/mL / x     / x     / x      CARDIAC MARKERS ( 28 Jun 2023 17:39 )  x     / <0.01 ng/mL / x     / x     / x            MICROBIOLOGY    Culture - Urine (collected 06-28-23 @ 04:40)  Source: Clean Catch Clean Catch (Midstream)  Final Report (06-29-23 @ 13:44):    <10,000 CFU/mL Normal Urogenital Mariann    Culture - Urine (collected 06-27-23 @ 18:58)  Source: Clean Catch Clean Catch (Midstream)  Final Report (06-29-23 @ 10:12):    <10,000 CFU/mL Normal Urogenital Mariann        IMAGE        CARDIAC MARKERS ( 28 Jun 2023 20:54 )  x     / <0.01 ng/mL / x     / x     / x      CARDIAC MARKERS ( 28 Jun 2023 17:39 )  x     / <0.01 ng/mL / x     / x     / x      CARDIAC MARKERS ( 27 Jun 2023 16:34 )  x     / <0.01 ng/mL / x     / x     / x          MICROBIOLOGY  COVID +     IMAGE  Xray Chest 1 View- PORTABLE-Routine (06.28.23 @ 08:54)  No focal consolidation, pneumothorax or pleural effusion.  Stable cardiomediastinal silhouette.  Unchanged osseous structures.    CT Angio Chest PE Protocol w/ IV Cont (06.27.23 @ 19:30)    No evidence of acute pulmonary embolus or acute thoracic pathology.      A/P: 51 yr old female with PMHx APLS, hx ofr stroke in 1992 with residual left sided weakness (on eliquis 5 mg BID), undifferentiated CTD, HbSC, hx of PE (on eliquis) presents presenting from rehab facility with chest pain, SOB, cough, headache. Found to have COVID+. Admitted for further management    #SOB, cough, headache due to COVID  #Pleuritic Chest Pain likely due to Costochondritis in setting of COVID Pneumonia, improving  - PE ruled out, less likely Viral pericarditis  - YAJAIRA neg, ds-DNA neg  - RVP: COVID+  - CT chest Angio showed Bibasilar dependent subsegmental areas of atelectasis and/or scarring.  - Echocardio:pending   - EKG: NSR  - symptoms improving with steroid  - cont decadron, remdeservir  - Cont MFF 1500mg po bid   - c/w ibuprofen 600mg po tid   - Cardio consult:pending   - ID consult :pending   - PT eval:pending    #Normocytic anemia slightly worse than baseline HbSC/ Hx of stroke /hx of PE   * no signs of bleeding   - cw cellcept and eliquis   - Repeat hb     #stroke in 1992 with residual left sided weakness (on eliquis 5 mg BID), undifferentiated CTD, HbSC, hx of PE (on eliquis)   - resume home meds    #DVT/GI px     Spent over 35 min reviewing chart and on coordinating patient care during interdisciplinary rounds     Olman Juan M.D./Lelo.  Attending Physician  MEDICINE ATTENDING PROGRESS NOTE  51 yr old female with PMHx APLS, hx ofr stroke in 1992 with residual left sided weakness (on eliquis 5 mg BID), undifferentiated CTD, HbSC, hx of PE (on eliquis) presents presenting from rehab facility with chest pain, SOB, cough, headache. Found to have COVID+. Admitted for further management    Interval/Overnight events:  - still endorses bilateral CP and upper back pain in setting of HbSC -> will do IVF management and pain management  - f/u 2D echo  - trial of Colchicine given pain not improve, cont steroid and ibuprofen   - increase oxycodone to 5mg q8H PRN    ROS:   General: denies fever, chills, insomnia, depression, weight change  Skin: denies itch, jaundice   Resp: denies cough, pleuritic chest pain, wheezing   CV: denies PND  GI: denies N/V/D constipation   : denies d/c, itching, hematuria, dysuria   EXT: denies pain, swelling, erythema   Musculoskeletal: denies low back pain, joint pain, swelling   Neuro: denies headache, weakness, syncope    MEDICATIONS  (STANDING):  apixaban 5 milliGRAM(s) Oral every 12 hours  dexAMETHasone     Tablet 6 milliGRAM(s) Oral daily  folic acid 1 milliGRAM(s) Oral daily  ibuprofen  Tablet. 600 milliGRAM(s) Oral three times a day  mycophenolate mofetil 1500 milliGRAM(s) Oral two times a day  pantoprazole    Tablet 40 milliGRAM(s) Oral before breakfast  remdesivir  IVPB   IV Intermittent   remdesivir  IVPB 100 milliGRAM(s) IV Intermittent every 24 hours    MEDICATIONS  (PRN):  oxyCODONE    IR 2.5 milliGRAM(s) Oral every 8 hours PRN Severe Pain (7 - 10)    VITALS  T(F): 97.9 (06-30-23 @ 07:53), Max: 98.4 (06-29-23 @ 15:51)  HR: 78 (06-30-23 @ 07:53) (78 - 107)  BP: 116/61 (06-30-23 @ 07:53) (90/50 - 132/75)  RR: 18 (06-30-23 @ 07:53) (18 - 18)  SpO2: 98% (06-30-23 @ 07:53) (98% - 99%)    PHYSICAL EXAM  Gen- NAD, AAOx3  HEENT- no pallor, anicteric, moist mucous membranes  CVS- S1/S2, no m/r/g  Chest- CTA b/l no w/r/c, no use of accessory muscles, good inspiratory effort, speaking in full sentences  Abd- soft, nt, nd  Ext- no edema, pulses intact b/l  Neuro-CN II-XII intact;    LABS/IMAGING  06-30    x   |  x   |  x   ----------------------------<  x   x    |  x   |  0.6<L>    Ca    9.0      29 Jun 2023 04:30  Mg     1.8     06-29    TPro  6.8  /  Alb  3.9  /  TBili  0.5  /  DBili  0.2  /  AST  10  /  ALT  12  /  AlkPhos  80  06-30    LIVER FUNCTIONS - ( 30 Jun 2023 06:27 )  Alb: 3.9 g/dL / Pro: 6.8 g/dL / ALK PHOS: 80 U/L / ALT: 12 U/L / AST: 10 U/L / GGT: x                               8.3    7.44  )-----------( 372      ( 29 Jun 2023 04:30 )             22.6     CARDIAC MARKERS ( 28 Jun 2023 20:54 )  x     / <0.01 ng/mL / x     / x     / x      CARDIAC MARKERS ( 28 Jun 2023 17:39 )  x     / <0.01 ng/mL / x     / x     / x          MICROBIOLOGY  Culture - Urine (collected 06-28-23 @ 04:40)  Source: Clean Catch Clean Catch (Midstream)  Final Report (06-29-23 @ 13:44):    <10,000 CFU/mL Normal Urogenital Mariann    Culture - Urine (collected 06-27-23 @ 18:58)  Source: Clean Catch Clean Catch (Midstream)  Final Report (06-29-23 @ 10:12):    <10,000 CFU/mL Normal Urogenital Mariann    COVID +     IMAGE  Xray Chest 1 View- PORTABLE-Routine (06.28.23 @ 08:54)  No focal consolidation, pneumothorax or pleural effusion.  Stable cardiomediastinal silhouette.  Unchanged osseous structures.    CT Angio Chest PE Protocol w/ IV Cont (06.27.23 @ 19:30)    No evidence of acute pulmonary embolus or acute thoracic pathology.      A/P: 51 yr old female with PMHx APLS, hx ofr stroke in 1992 with residual left sided weakness (on eliquis 5 mg BID), undifferentiated CTD, HbSC, hx of PE (on eliquis) presents presenting from rehab facility with chest pain, SOB, cough, headache. Found to have COVID+. Admitted for further management    #SOB, cough, headache due to COVID  #Pleuritic Chest Pain likely due to Costochondritis in setting of COVID Pneumonia, improving  - PE ruled out, less likely Viral pericarditis  - YAJAIRA neg, ds-DNA neg  - RVP: COVID+  - CT chest Angio showed Bibasilar dependent subsegmental areas of atelectasis and/or scarring.  - Echocardio:pending   - EKG: NSR  - symptoms improving with steroid  - cont decadron, remdeservir  - Cont MFF 1500mg po bid   - c/w ibuprofen 600mg po tid   - Cardio consult:pending   - ID consult :pending   - PT eval:pending    #Normocytic anemia slightly worse than baseline HbSC/ Hx of stroke /hx of PE   * no signs of bleeding   - cw cellcept and eliquis   - Repeat hb     #stroke in 1992 with residual left sided weakness (on eliquis 5 mg BID), undifferentiated CTD, HbSC, hx of PE (on eliquis)   - resume home meds    #DVT/GI px     Spent over 35 min reviewing chart and on coordinating patient care during interdisciplinary rounds     Olman Juan M.D./Jerry  Attending Physician

## 2023-07-01 LAB
ALBUMIN SERPL ELPH-MCNC: 3.8 G/DL — SIGNIFICANT CHANGE UP (ref 3.5–5.2)
ALP SERPL-CCNC: 72 U/L — SIGNIFICANT CHANGE UP (ref 30–115)
ALT FLD-CCNC: 11 U/L — SIGNIFICANT CHANGE UP (ref 0–41)
ANION GAP SERPL CALC-SCNC: 13 MMOL/L — SIGNIFICANT CHANGE UP (ref 7–14)
AST SERPL-CCNC: 9 U/L — SIGNIFICANT CHANGE UP (ref 0–41)
BILIRUB DIRECT SERPL-MCNC: 0.2 MG/DL — SIGNIFICANT CHANGE UP (ref 0–0.3)
BILIRUB INDIRECT FLD-MCNC: 0.3 MG/DL — SIGNIFICANT CHANGE UP (ref 0.2–1.2)
BILIRUB SERPL-MCNC: 0.5 MG/DL — SIGNIFICANT CHANGE UP (ref 0.2–1.2)
BUN SERPL-MCNC: 15 MG/DL — SIGNIFICANT CHANGE UP (ref 10–20)
CALCIUM SERPL-MCNC: 9.1 MG/DL — SIGNIFICANT CHANGE UP (ref 8.4–10.5)
CARDIOLIPIN AB SER-ACNC: NEGATIVE — SIGNIFICANT CHANGE UP
CHLORIDE SERPL-SCNC: 104 MMOL/L — SIGNIFICANT CHANGE UP (ref 98–110)
CO2 SERPL-SCNC: 22 MMOL/L — SIGNIFICANT CHANGE UP (ref 17–32)
CREAT SERPL-MCNC: 0.6 MG/DL — LOW (ref 0.7–1.5)
EGFR: 107 ML/MIN/1.73M2 — SIGNIFICANT CHANGE UP
GLUCOSE SERPL-MCNC: 120 MG/DL — HIGH (ref 70–99)
HCT VFR BLD CALC: 23.7 % — LOW (ref 37–47)
HGB BLD-MCNC: 8.8 G/DL — LOW (ref 12–16)
INR BLD: 1.26 RATIO — SIGNIFICANT CHANGE UP (ref 0.65–1.3)
MAGNESIUM SERPL-MCNC: 1.7 MG/DL — LOW (ref 1.8–2.4)
MCHC RBC-ENTMCNC: 33.5 PG — HIGH (ref 27–31)
MCHC RBC-ENTMCNC: 37.1 G/DL — HIGH (ref 32–37)
MCV RBC AUTO: 90.1 FL — SIGNIFICANT CHANGE UP (ref 81–99)
NRBC # BLD: 2 /100 WBCS — HIGH (ref 0–0)
PLATELET # BLD AUTO: 394 K/UL — SIGNIFICANT CHANGE UP (ref 130–400)
PMV BLD: 9.7 FL — SIGNIFICANT CHANGE UP (ref 7.4–10.4)
POTASSIUM SERPL-MCNC: 3.8 MMOL/L — SIGNIFICANT CHANGE UP (ref 3.5–5)
POTASSIUM SERPL-SCNC: 3.8 MMOL/L — SIGNIFICANT CHANGE UP (ref 3.5–5)
PROT SERPL-MCNC: 6.4 G/DL — SIGNIFICANT CHANGE UP (ref 6–8)
PROTHROM AB SERPL-ACNC: 14.5 SEC — HIGH (ref 9.95–12.87)
RBC # BLD: 2.63 M/UL — LOW (ref 4.2–5.4)
RBC # FLD: 13.9 % — SIGNIFICANT CHANGE UP (ref 11.5–14.5)
SODIUM SERPL-SCNC: 139 MMOL/L — SIGNIFICANT CHANGE UP (ref 135–146)
WBC # BLD: 11.07 K/UL — HIGH (ref 4.8–10.8)
WBC # FLD AUTO: 11.07 K/UL — HIGH (ref 4.8–10.8)

## 2023-07-01 RX ORDER — POLYETHYLENE GLYCOL 3350 17 G/17G
17 POWDER, FOR SOLUTION ORAL DAILY
Refills: 0 | Status: DISCONTINUED | OUTPATIENT
Start: 2023-07-01 | End: 2023-07-11

## 2023-07-01 RX ORDER — MAGNESIUM SULFATE 500 MG/ML
2 VIAL (ML) INJECTION ONCE
Refills: 0 | Status: COMPLETED | OUTPATIENT
Start: 2023-07-01 | End: 2023-07-01

## 2023-07-01 RX ORDER — OXYCODONE HYDROCHLORIDE 5 MG/1
5 TABLET ORAL ONCE
Refills: 0 | Status: DISCONTINUED | OUTPATIENT
Start: 2023-07-01 | End: 2023-07-01

## 2023-07-01 RX ORDER — OXYCODONE HYDROCHLORIDE 5 MG/1
5 TABLET ORAL EVERY 6 HOURS
Refills: 0 | Status: DISCONTINUED | OUTPATIENT
Start: 2023-07-01 | End: 2023-07-03

## 2023-07-01 RX ADMIN — Medication 1 MILLIGRAM(S): at 12:18

## 2023-07-01 RX ADMIN — APIXABAN 5 MILLIGRAM(S): 2.5 TABLET, FILM COATED ORAL at 05:39

## 2023-07-01 RX ADMIN — POLYETHYLENE GLYCOL 3350 17 GRAM(S): 17 POWDER, FOR SOLUTION ORAL at 12:45

## 2023-07-01 RX ADMIN — Medication 600 MILLIGRAM(S): at 21:11

## 2023-07-01 RX ADMIN — OXYCODONE HYDROCHLORIDE 5 MILLIGRAM(S): 5 TABLET ORAL at 07:55

## 2023-07-01 RX ADMIN — Medication 600 MILLIGRAM(S): at 14:12

## 2023-07-01 RX ADMIN — PANTOPRAZOLE SODIUM 40 MILLIGRAM(S): 20 TABLET, DELAYED RELEASE ORAL at 05:40

## 2023-07-01 RX ADMIN — OXYCODONE HYDROCHLORIDE 5 MILLIGRAM(S): 5 TABLET ORAL at 11:14

## 2023-07-01 RX ADMIN — OXYCODONE HYDROCHLORIDE 5 MILLIGRAM(S): 5 TABLET ORAL at 17:57

## 2023-07-01 RX ADMIN — OXYCODONE HYDROCHLORIDE 5 MILLIGRAM(S): 5 TABLET ORAL at 17:27

## 2023-07-01 RX ADMIN — OXYCODONE HYDROCHLORIDE 5 MILLIGRAM(S): 5 TABLET ORAL at 08:25

## 2023-07-01 RX ADMIN — Medication 0.6 MILLIGRAM(S): at 05:39

## 2023-07-01 RX ADMIN — MYCOPHENOLATE MOFETIL 1500 MILLIGRAM(S): 250 CAPSULE ORAL at 05:39

## 2023-07-01 RX ADMIN — Medication 600 MILLIGRAM(S): at 13:42

## 2023-07-01 RX ADMIN — Medication 0.6 MILLIGRAM(S): at 17:26

## 2023-07-01 RX ADMIN — Medication 600 MILLIGRAM(S): at 05:40

## 2023-07-01 RX ADMIN — OXYCODONE HYDROCHLORIDE 5 MILLIGRAM(S): 5 TABLET ORAL at 10:44

## 2023-07-01 RX ADMIN — Medication 25 GRAM(S): at 10:22

## 2023-07-01 RX ADMIN — APIXABAN 5 MILLIGRAM(S): 2.5 TABLET, FILM COATED ORAL at 17:27

## 2023-07-01 RX ADMIN — Medication 6 MILLIGRAM(S): at 05:40

## 2023-07-01 RX ADMIN — MYCOPHENOLATE MOFETIL 1500 MILLIGRAM(S): 250 CAPSULE ORAL at 17:27

## 2023-07-01 RX ADMIN — REMDESIVIR 200 MILLIGRAM(S): 5 INJECTION INTRAVENOUS at 17:26

## 2023-07-01 NOTE — PROGRESS NOTE ADULT - SUBJECTIVE AND OBJECTIVE BOX
PATRICIO WELDON  53y Female    CHIEF COMPLAINT:    Patient is a 53y old  Female who presents with a chief complaint of Chest pain (28 Jun 2023 08:33)      INTERVAL HPI/OVERNIGHT EVENTS:    Patient seen and examined. No acute events overnight. complaining of pain    ROS: All other systems are negative.    Vital Signs:    T(F): 97.6 (07-01-23 @ 13:09), Max: 98.6 (06-30-23 @ 18:05)  HR: 87 (07-01-23 @ 13:09) (76 - 87)  BP: 104/57 (07-01-23 @ 13:09) (104/57 - 129/69)  RR: 18 (07-01-23 @ 13:09) (18 - 18)  SpO2: 99% (07-01-23 @ 04:57) (99% - 99%)  I&O's Summary    01 Jul 2023 07:01  -  01 Jul 2023 16:08  --------------------------------------------------------  IN: 850 mL / OUT: 0 mL / NET: 850 mL      Daily Height in cm: 165.1 (30 Jun 2023 18:05)    Daily   CAPILLARY BLOOD GLUCOSE          PHYSICAL EXAM:    GENERAL:  NAD  SKIN: No rashes or lesions  HEENT: Atraumatic. Normocephalic. PERRL. Moist membranes.  NECK: Supple, No JVD. No lymphadenopathy.  PULMONARY: CTA B/L. No wheezing. No rales  CVS: Normal S1, S2. Rate and Rhythm are regular. No murmurs.  ABDOMEN/GI: Soft, Nontender, Nondistended; BS present  MSK:  No edema B/L LE.  NEUROLOGIC:  No motor or sensory deficit.  PSYCH: Alert & oriented x 3, normal affect    Consultant(s) Notes Reviewed:  [x ] YES  [ ] NO  Care Discussed with Consultants/Other Providers [ x] YES  [ ] NO    LABS:                        8.8    11.07 )-----------( 394      ( 01 Jul 2023 07:26 )             23.7     07-01    139  |  104  |  15  ----------------------------<  120<H>  3.8   |  22  |  0.6<L>    Ca    9.1      01 Jul 2023 07:26  Mg     1.7     07-01    TPro  6.4  /  Alb  3.8  /  TBili  0.5  /  DBili  0.2  /  AST  9   /  ALT  11  /  AlkPhos  72  07-01    PT/INR - ( 01 Jul 2023 07:26 )   PT: 14.50 sec;   INR: 1.26 ratio             Trop <0.01, CKMB --, CK --, 06-28-23 @ 20:54  Trop <0.01, CKMB --, CK --, 06-28-23 @ 17:39        RADIOLOGY & ADDITIONAL TESTS:      Imaging or report Personally Reviewed:  [x] YES  [ ] NO  EKG reviewed: [x] YES  [ ] NO    Medications:  Standing  apixaban 5 milliGRAM(s) Oral every 12 hours  colchicine 0.6 milliGRAM(s) Oral every 12 hours  dexAMETHasone  Injectable 6 milliGRAM(s) IV Push daily  folic acid 1 milliGRAM(s) Oral daily  ibuprofen  Tablet. 600 milliGRAM(s) Oral three times a day  mycophenolate mofetil 1500 milliGRAM(s) Oral two times a day  pantoprazole    Tablet 40 milliGRAM(s) Oral before breakfast  polyethylene glycol 3350 17 Gram(s) Oral daily  remdesivir  IVPB   IV Intermittent   remdesivir  IVPB 100 milliGRAM(s) IV Intermittent every 24 hours  sodium chloride 0.45%. 1000 milliLiter(s) IV Continuous <Continuous>    PRN Meds  oxyCODONE    IR 5 milliGRAM(s) Oral every 6 hours PRN

## 2023-07-01 NOTE — PROGRESS NOTE ADULT - ASSESSMENT
53 yr old female with PMHx APLS, hx ofr stroke in 1992 with residual left sided weakness (on eliquis 5 mg BID), undifferentiated CTD, HbSC, hx of PE (on eliquis) presents presenting from rehab facility with chest pain, SOB, cough, headache. Found to have COVID+. Admitted for further management    #SOB, cough, headache due to COVID  #Pleuritic Chest Pain likely due to Costochondritis in setting of COVID Pneumonia, improving  - PE ruled out, less likely Viral pericarditis  - YAJAIRA neg, ds-DNA neg  - RVP: COVID+  - CT chest Angio showed Bibasilar dependent subsegmental areas of atelectasis and/or scarring.  - Echocardio:pending   - EKG: NSR  - symptoms improving with steroid  - cont decadron, remdeservir  - Cont MFF 1500mg po bid   - c/w ibuprofen 600mg po tid   - Cardio consult:pending   - ID consult :pending   - PT eval:pending  echo pending  - pain control  -cw IVF    #Normocytic anemia slightly worse than baseline HbSC/ Hx of stroke /hx of PE   * no signs of bleeding   - cw cellcept and eliquis   - Repeat hb     #stroke in 1992 with residual left sided weakness (on eliquis 5 mg BID), undifferentiated CTD, HbSC, hx of PE (on eliquis)   - resume home meds    #DVT/GI px

## 2023-07-02 LAB
ALBUMIN SERPL ELPH-MCNC: 3.9 G/DL — SIGNIFICANT CHANGE UP (ref 3.5–5.2)
ALP SERPL-CCNC: 68 U/L — SIGNIFICANT CHANGE UP (ref 30–115)
ALT FLD-CCNC: 11 U/L — SIGNIFICANT CHANGE UP (ref 0–41)
ANION GAP SERPL CALC-SCNC: 12 MMOL/L — SIGNIFICANT CHANGE UP (ref 7–14)
AST SERPL-CCNC: 10 U/L — SIGNIFICANT CHANGE UP (ref 0–41)
BILIRUB DIRECT SERPL-MCNC: 0.2 MG/DL — SIGNIFICANT CHANGE UP (ref 0–0.3)
BILIRUB INDIRECT FLD-MCNC: 0.3 MG/DL — SIGNIFICANT CHANGE UP (ref 0.2–1.2)
BILIRUB SERPL-MCNC: 0.5 MG/DL — SIGNIFICANT CHANGE UP (ref 0.2–1.2)
BUN SERPL-MCNC: 15 MG/DL — SIGNIFICANT CHANGE UP (ref 10–20)
CALCIUM SERPL-MCNC: 8.8 MG/DL — SIGNIFICANT CHANGE UP (ref 8.4–10.4)
CHLORIDE SERPL-SCNC: 107 MMOL/L — SIGNIFICANT CHANGE UP (ref 98–110)
CO2 SERPL-SCNC: 24 MMOL/L — SIGNIFICANT CHANGE UP (ref 17–32)
CREAT SERPL-MCNC: 0.6 MG/DL — LOW (ref 0.7–1.5)
EGFR: 107 ML/MIN/1.73M2 — SIGNIFICANT CHANGE UP
GLUCOSE SERPL-MCNC: 113 MG/DL — HIGH (ref 70–99)
HCT VFR BLD CALC: 24.4 % — LOW (ref 37–47)
HGB BLD-MCNC: 8.8 G/DL — LOW (ref 12–16)
INR BLD: 1.13 RATIO — SIGNIFICANT CHANGE UP (ref 0.65–1.3)
MAGNESIUM SERPL-MCNC: 2 MG/DL — SIGNIFICANT CHANGE UP (ref 1.8–2.4)
MCHC RBC-ENTMCNC: 32.7 PG — HIGH (ref 27–31)
MCHC RBC-ENTMCNC: 36.1 G/DL — SIGNIFICANT CHANGE UP (ref 32–37)
MCV RBC AUTO: 90.7 FL — SIGNIFICANT CHANGE UP (ref 81–99)
NRBC # BLD: 4 /100 WBCS — HIGH (ref 0–0)
PLATELET # BLD AUTO: 408 K/UL — HIGH (ref 130–400)
PMV BLD: 9.6 FL — SIGNIFICANT CHANGE UP (ref 7.4–10.4)
POTASSIUM SERPL-MCNC: 4.1 MMOL/L — SIGNIFICANT CHANGE UP (ref 3.5–5)
POTASSIUM SERPL-SCNC: 4.1 MMOL/L — SIGNIFICANT CHANGE UP (ref 3.5–5)
PROT SERPL-MCNC: 6.3 G/DL — SIGNIFICANT CHANGE UP (ref 6–8)
PROTHROM AB SERPL-ACNC: 12.9 SEC — HIGH (ref 9.95–12.87)
RBC # BLD: 2.69 M/UL — LOW (ref 4.2–5.4)
RBC # FLD: 14.2 % — SIGNIFICANT CHANGE UP (ref 11.5–14.5)
SODIUM SERPL-SCNC: 143 MMOL/L — SIGNIFICANT CHANGE UP (ref 135–146)
WBC # BLD: 12.15 K/UL — HIGH (ref 4.8–10.8)
WBC # FLD AUTO: 12.15 K/UL — HIGH (ref 4.8–10.8)

## 2023-07-02 PROCEDURE — 93306 TTE W/DOPPLER COMPLETE: CPT | Mod: 26

## 2023-07-02 PROCEDURE — 71045 X-RAY EXAM CHEST 1 VIEW: CPT | Mod: 26

## 2023-07-02 PROCEDURE — 83020 HEMOGLOBIN ELECTROPHORESIS: CPT | Mod: 26

## 2023-07-02 RX ORDER — SODIUM CHLORIDE 9 MG/ML
1000 INJECTION, SOLUTION INTRAVENOUS
Refills: 0 | Status: DISCONTINUED | OUTPATIENT
Start: 2023-07-02 | End: 2023-07-03

## 2023-07-02 RX ORDER — GABAPENTIN 400 MG/1
300 CAPSULE ORAL AT BEDTIME
Refills: 0 | Status: DISCONTINUED | OUTPATIENT
Start: 2023-07-02 | End: 2023-07-10

## 2023-07-02 RX ADMIN — Medication 600 MILLIGRAM(S): at 21:27

## 2023-07-02 RX ADMIN — MYCOPHENOLATE MOFETIL 1500 MILLIGRAM(S): 250 CAPSULE ORAL at 17:53

## 2023-07-02 RX ADMIN — Medication 600 MILLIGRAM(S): at 14:08

## 2023-07-02 RX ADMIN — OXYCODONE HYDROCHLORIDE 5 MILLIGRAM(S): 5 TABLET ORAL at 00:04

## 2023-07-02 RX ADMIN — OXYCODONE HYDROCHLORIDE 5 MILLIGRAM(S): 5 TABLET ORAL at 00:34

## 2023-07-02 RX ADMIN — SODIUM CHLORIDE 100 MILLILITER(S): 9 INJECTION, SOLUTION INTRAVENOUS at 17:58

## 2023-07-02 RX ADMIN — Medication 600 MILLIGRAM(S): at 22:27

## 2023-07-02 RX ADMIN — Medication 600 MILLIGRAM(S): at 06:54

## 2023-07-02 RX ADMIN — APIXABAN 5 MILLIGRAM(S): 2.5 TABLET, FILM COATED ORAL at 17:53

## 2023-07-02 RX ADMIN — POLYETHYLENE GLYCOL 3350 17 GRAM(S): 17 POWDER, FOR SOLUTION ORAL at 12:08

## 2023-07-02 RX ADMIN — Medication 600 MILLIGRAM(S): at 07:24

## 2023-07-02 RX ADMIN — APIXABAN 5 MILLIGRAM(S): 2.5 TABLET, FILM COATED ORAL at 06:51

## 2023-07-02 RX ADMIN — Medication 6 MILLIGRAM(S): at 06:51

## 2023-07-02 RX ADMIN — Medication 0.6 MILLIGRAM(S): at 17:53

## 2023-07-02 RX ADMIN — PANTOPRAZOLE SODIUM 40 MILLIGRAM(S): 20 TABLET, DELAYED RELEASE ORAL at 06:51

## 2023-07-02 RX ADMIN — OXYCODONE HYDROCHLORIDE 5 MILLIGRAM(S): 5 TABLET ORAL at 17:58

## 2023-07-02 RX ADMIN — Medication 0.6 MILLIGRAM(S): at 06:51

## 2023-07-02 RX ADMIN — GABAPENTIN 300 MILLIGRAM(S): 400 CAPSULE ORAL at 21:27

## 2023-07-02 RX ADMIN — OXYCODONE HYDROCHLORIDE 5 MILLIGRAM(S): 5 TABLET ORAL at 09:38

## 2023-07-02 RX ADMIN — Medication 1 MILLIGRAM(S): at 12:04

## 2023-07-02 RX ADMIN — MYCOPHENOLATE MOFETIL 1500 MILLIGRAM(S): 250 CAPSULE ORAL at 06:50

## 2023-07-02 RX ADMIN — OXYCODONE HYDROCHLORIDE 5 MILLIGRAM(S): 5 TABLET ORAL at 09:08

## 2023-07-02 RX ADMIN — Medication 600 MILLIGRAM(S): at 14:38

## 2023-07-02 RX ADMIN — REMDESIVIR 200 MILLIGRAM(S): 5 INJECTION INTRAVENOUS at 17:52

## 2023-07-02 NOTE — PROGRESS NOTE ADULT - SUBJECTIVE AND OBJECTIVE BOX
PATRICIO WELDON  53y Female    CHIEF COMPLAINT:    Patient is a 53y old  Female who presents with a chief complaint of Chest pain (28 Jun 2023 08:33)      INTERVAL HPI/OVERNIGHT EVENTS:    Patient seen and examined. No acute events overnight.     ROS: All other systems are negative.    Vital Signs:    T(F): 98.2 (07-02-23 @ 14:38), Max: 98.2 (07-02-23 @ 14:38)  HR: 83 (07-02-23 @ 14:38) (73 - 83)  BP: 122/62 (07-02-23 @ 14:38) (108/55 - 131/67)  RR: 18 (07-02-23 @ 14:38) (18 - 18)  SpO2: --  I&O's Summary    01 Jul 2023 07:01  -  02 Jul 2023 07:00  --------------------------------------------------------  IN: 1775 mL / OUT: 0 mL / NET: 1775 mL      Daily     Daily   CAPILLARY BLOOD GLUCOSE          PHYSICAL EXAM:    GENERAL:  NAD  SKIN: No rashes or lesions  HEENT: Atraumatic. Normocephalic. PERRL. Moist membranes.  NECK: Supple, No JVD. No lymphadenopathy.  PULMONARY: CTA B/L. No wheezing. No rales  CVS: Normal S1, S2. Rate and Rhythm are regular. No murmurs.  ABDOMEN/GI: Soft, Nontender, Nondistended; BS present  MSK:  No edema B/L LE.  NEUROLOGIC:  No motor or sensory deficit.  PSYCH: Alert & oriented x 3, normal affect    Consultant(s) Notes Reviewed:  [x ] YES  [ ] NO  Care Discussed with Consultants/Other Providers [ x] YES  [ ] NO    LABS:                        8.8    12.15 )-----------( 408      ( 02 Jul 2023 07:00 )             24.4     07-02    143  |  107  |  15  ----------------------------<  113<H>  4.1   |  24  |  0.6<L>    Ca    8.8      02 Jul 2023 07:00  Mg     2.0     07-02    TPro  6.3  /  Alb  3.9  /  TBili  0.5  /  DBili  0.2  /  AST  10  /  ALT  11  /  AlkPhos  68  07-02    PT/INR - ( 02 Jul 2023 07:00 )   PT: 12.90 sec;   INR: 1.13 ratio                   RADIOLOGY & ADDITIONAL TESTS:      Imaging or report Personally Reviewed:  [x] YES  [ ] NO  EKG reviewed: [x] YES  [ ] NO    Medications:  Standing  apixaban 5 milliGRAM(s) Oral every 12 hours  colchicine 0.6 milliGRAM(s) Oral every 12 hours  dexAMETHasone  Injectable 6 milliGRAM(s) IV Push daily  folic acid 1 milliGRAM(s) Oral daily  ibuprofen  Tablet. 600 milliGRAM(s) Oral three times a day  mycophenolate mofetil 1500 milliGRAM(s) Oral two times a day  pantoprazole    Tablet 40 milliGRAM(s) Oral before breakfast  polyethylene glycol 3350 17 Gram(s) Oral daily  remdesivir  IVPB 100 milliGRAM(s) IV Intermittent every 24 hours  remdesivir  IVPB   IV Intermittent   sodium chloride 0.45%. 1000 milliLiter(s) IV Continuous <Continuous>    PRN Meds  oxyCODONE    IR 5 milliGRAM(s) Oral every 6 hours PRN             PATRICIO WELDON  53y Female    CHIEF COMPLAINT:    Patient is a 53y old  Female who presents with a chief complaint of Chest pain (28 Jun 2023 08:33)      INTERVAL HPI/OVERNIGHT EVENTS:    Patient seen and examined. No acute events overnight. still complaining of pain    ROS: All other systems are negative.    Vital Signs:    T(F): 98.2 (07-02-23 @ 14:38), Max: 98.2 (07-02-23 @ 14:38)  HR: 83 (07-02-23 @ 14:38) (73 - 83)  BP: 122/62 (07-02-23 @ 14:38) (108/55 - 131/67)  RR: 18 (07-02-23 @ 14:38) (18 - 18)  SpO2: --  I&O's Summary    01 Jul 2023 07:01  -  02 Jul 2023 07:00  --------------------------------------------------------  IN: 1775 mL / OUT: 0 mL / NET: 1775 mL      Daily     Daily   CAPILLARY BLOOD GLUCOSE          PHYSICAL EXAM:    GENERAL:  NAD  SKIN: No rashes or lesions  HEENT: Atraumatic. Normocephalic. PERRL. Moist membranes.  NECK: Supple, No JVD. No lymphadenopathy.  PULMONARY: CTA B/L. No wheezing. No rales  CVS: Normal S1, S2. Rate and Rhythm are regular. No murmurs.  ABDOMEN/GI: Soft, Nontender, Nondistended; BS present  MSK:  No edema B/L LE.  NEUROLOGIC:  No motor or sensory deficit.  PSYCH: Alert & oriented x 3, normal affect    Consultant(s) Notes Reviewed:  [x ] YES  [ ] NO  Care Discussed with Consultants/Other Providers [ x] YES  [ ] NO    LABS:                        8.8    12.15 )-----------( 408      ( 02 Jul 2023 07:00 )             24.4     07-02    143  |  107  |  15  ----------------------------<  113<H>  4.1   |  24  |  0.6<L>    Ca    8.8      02 Jul 2023 07:00  Mg     2.0     07-02    TPro  6.3  /  Alb  3.9  /  TBili  0.5  /  DBili  0.2  /  AST  10  /  ALT  11  /  AlkPhos  68  07-02    PT/INR - ( 02 Jul 2023 07:00 )   PT: 12.90 sec;   INR: 1.13 ratio                   RADIOLOGY & ADDITIONAL TESTS:      Imaging or report Personally Reviewed:  [x] YES  [ ] NO  EKG reviewed: [x] YES  [ ] NO    Medications:  Standing  apixaban 5 milliGRAM(s) Oral every 12 hours  colchicine 0.6 milliGRAM(s) Oral every 12 hours  dexAMETHasone  Injectable 6 milliGRAM(s) IV Push daily  folic acid 1 milliGRAM(s) Oral daily  ibuprofen  Tablet. 600 milliGRAM(s) Oral three times a day  mycophenolate mofetil 1500 milliGRAM(s) Oral two times a day  pantoprazole    Tablet 40 milliGRAM(s) Oral before breakfast  polyethylene glycol 3350 17 Gram(s) Oral daily  remdesivir  IVPB 100 milliGRAM(s) IV Intermittent every 24 hours  remdesivir  IVPB   IV Intermittent   sodium chloride 0.45%. 1000 milliLiter(s) IV Continuous <Continuous>    PRN Meds  oxyCODONE    IR 5 milliGRAM(s) Oral every 6 hours PRN

## 2023-07-02 NOTE — PROGRESS NOTE ADULT - ASSESSMENT
53 yr old female with PMHx APLS, hx ofr stroke in 1992 with residual left sided weakness (on eliquis 5 mg BID), undifferentiated CTD, HbSC, hx of PE (on eliquis) presents presenting from rehab facility with chest pain, SOB, cough, headache. Found to have COVID+. Admitted for further management    #SOB, cough, headache due to COVID  #Pleuritic Chest Pain likely due to Costochondritis in setting of COVID Pneumonia, improving  - PE ruled out, less likely Viral pericarditis  - YAJAIRA neg, ds-DNA neg  - RVP: COVID+  - CT chest Angio showed Bibasilar dependent subsegmental areas of atelectasis and/or scarring.  - Echocardio:done, results pending  - EKG: NSR  - symptoms improving with steroid  - cont decadron, remdeservir  - Cont MFF 1500mg po bid   - c/w ibuprofen 600mg po tid   - Cardio consult appreciated, colchicine, echo, hold a/c if pericardial effusion, cw ibuprophen  - ID consult appreciated  - PT eval:pending  - pain control    #Normocytic anemia slightly worse than baseline HbSC/ Hx of stroke /hx of PE   * no signs of bleeding   - cw cellcept and eliquis   - Repeat hb     #stroke in 1992 with residual left sided weakness (on eliquis 5 mg BID), undifferentiated CTD, HbSC, hx of PE (on eliquis)   - resume home meds    #DVT/GI px  53 yr old female with PMHx APLS, hx ofr stroke in 1992 with residual left sided weakness (on eliquis 5 mg BID), undifferentiated CTD, HbSC, hx of PE (on eliquis) presents presenting from rehab facility with chest pain, SOB, cough, headache. Found to have COVID+. Admitted for further management    #SOB, cough, headache due to COVID  #Pleuritic Chest Pain likely due to Costochondritis in setting of COVID Pneumonia, improving.   - PE ruled out, less likely Viral pericarditis.    - YAJAIRA neg, ds-DNA neg.   - RVP: COVID+   - CT chest Angio showed Bibasilar dependent subsegmental areas of atelectasis and/or scarring.  - Echocardio: done, results pending  - EKG: NSR  - cont decadron, remdeservir  - Cont MFF 1500mg po bid   - c/w ibuprofen 600mg po tid   - Cardio consult appreciated, colchicine, echo, hold a/c if pericardial effusion, cw ibuprophen  - ID consult appreciated  - PT eval: pending  - pain control  - repeat CXR  - trial of gabapentin  - check hgb electropheresis    #Normocytic anemia slightly worse than baseline HbSC/ Hx of stroke /hx of PE   * no signs of bleeding   - cw cellcept and eliquis   - monitor h/H     #stroke in 1992 with residual left sided weakness (on eliquis 5 mg BID), undifferentiated CTD, HbSC, hx of PE (on eliquis)   - resume home meds    #DVT/GI px

## 2023-07-03 LAB
ALBUMIN SERPL ELPH-MCNC: 3.3 G/DL — LOW (ref 3.5–5.2)
ALBUMIN SERPL ELPH-MCNC: 3.4 G/DL — LOW (ref 3.5–5.2)
ALP SERPL-CCNC: 67 U/L — SIGNIFICANT CHANGE UP (ref 30–115)
ALP SERPL-CCNC: 67 U/L — SIGNIFICANT CHANGE UP (ref 30–115)
ALT FLD-CCNC: 10 U/L — SIGNIFICANT CHANGE UP (ref 0–41)
ALT FLD-CCNC: 10 U/L — SIGNIFICANT CHANGE UP (ref 0–41)
ANION GAP SERPL CALC-SCNC: 10 MMOL/L — SIGNIFICANT CHANGE UP (ref 7–14)
AST SERPL-CCNC: 9 U/L — SIGNIFICANT CHANGE UP (ref 0–41)
AST SERPL-CCNC: 9 U/L — SIGNIFICANT CHANGE UP (ref 0–41)
BILIRUB DIRECT SERPL-MCNC: 0.2 MG/DL — SIGNIFICANT CHANGE UP (ref 0–0.3)
BILIRUB INDIRECT FLD-MCNC: 0.4 MG/DL — SIGNIFICANT CHANGE UP (ref 0.2–1.2)
BILIRUB SERPL-MCNC: 0.6 MG/DL — SIGNIFICANT CHANGE UP (ref 0.2–1.2)
BILIRUB SERPL-MCNC: 0.6 MG/DL — SIGNIFICANT CHANGE UP (ref 0.2–1.2)
BUN SERPL-MCNC: 15 MG/DL — SIGNIFICANT CHANGE UP (ref 10–20)
CALCIUM SERPL-MCNC: 8.8 MG/DL — SIGNIFICANT CHANGE UP (ref 8.4–10.5)
CHLORIDE SERPL-SCNC: 105 MMOL/L — SIGNIFICANT CHANGE UP (ref 98–110)
CO2 SERPL-SCNC: 24 MMOL/L — SIGNIFICANT CHANGE UP (ref 17–32)
CREAT SERPL-MCNC: 0.6 MG/DL — LOW (ref 0.7–1.5)
EGFR: 107 ML/MIN/1.73M2 — SIGNIFICANT CHANGE UP
GLUCOSE SERPL-MCNC: 118 MG/DL — HIGH (ref 70–99)
HCT VFR BLD CALC: 23.3 % — LOW (ref 37–47)
HGB BLD-MCNC: 8.5 G/DL — LOW (ref 12–16)
IGG SERPL-MCNC: 1299 MG/DL — SIGNIFICANT CHANGE UP (ref 586–1602)
IGG1 SER-MCNC: 765 MG/DL — SIGNIFICANT CHANGE UP (ref 248–810)
IGG2 SER-MCNC: 322 MG/DL — SIGNIFICANT CHANGE UP (ref 130–555)
IGG3 SER-MCNC: 54 MG/DL — SIGNIFICANT CHANGE UP (ref 15–102)
IGG4 SER-MCNC: 64 MG/DL — SIGNIFICANT CHANGE UP (ref 2–96)
INR BLD: 1.13 RATIO — SIGNIFICANT CHANGE UP (ref 0.65–1.3)
MAGNESIUM SERPL-MCNC: 1.8 MG/DL — SIGNIFICANT CHANGE UP (ref 1.8–2.4)
MCHC RBC-ENTMCNC: 32.7 PG — HIGH (ref 27–31)
MCHC RBC-ENTMCNC: 36.5 G/DL — SIGNIFICANT CHANGE UP (ref 32–37)
MCV RBC AUTO: 89.6 FL — SIGNIFICANT CHANGE UP (ref 81–99)
NRBC # BLD: 4 /100 WBCS — HIGH (ref 0–0)
PLATELET # BLD AUTO: 384 K/UL — SIGNIFICANT CHANGE UP (ref 130–400)
PMV BLD: 9.5 FL — SIGNIFICANT CHANGE UP (ref 7.4–10.4)
POTASSIUM SERPL-MCNC: 3.8 MMOL/L — SIGNIFICANT CHANGE UP (ref 3.5–5)
POTASSIUM SERPL-SCNC: 3.8 MMOL/L — SIGNIFICANT CHANGE UP (ref 3.5–5)
PROT SERPL-MCNC: 5.8 G/DL — LOW (ref 6–8)
PROT SERPL-MCNC: 5.9 G/DL — LOW (ref 6–8)
PROTHROM AB SERPL-ACNC: 12.9 SEC — HIGH (ref 9.95–12.87)
RBC # BLD: 2.6 M/UL — LOW (ref 4.2–5.4)
RBC # FLD: 14 % — SIGNIFICANT CHANGE UP (ref 11.5–14.5)
SODIUM SERPL-SCNC: 139 MMOL/L — SIGNIFICANT CHANGE UP (ref 135–146)
WBC # BLD: 11.16 K/UL — HIGH (ref 4.8–10.8)
WBC # FLD AUTO: 11.16 K/UL — HIGH (ref 4.8–10.8)

## 2023-07-03 RX ORDER — LACTULOSE 10 G/15ML
10 SOLUTION ORAL ONCE
Refills: 0 | Status: COMPLETED | OUTPATIENT
Start: 2023-07-03 | End: 2023-07-03

## 2023-07-03 RX ORDER — KETOROLAC TROMETHAMINE 30 MG/ML
15 SYRINGE (ML) INJECTION ONCE
Refills: 0 | Status: DISCONTINUED | OUTPATIENT
Start: 2023-07-03 | End: 2023-07-03

## 2023-07-03 RX ORDER — OXYCODONE HYDROCHLORIDE 5 MG/1
5 TABLET ORAL EVERY 4 HOURS
Refills: 0 | Status: DISCONTINUED | OUTPATIENT
Start: 2023-07-03 | End: 2023-07-05

## 2023-07-03 RX ORDER — OXYCODONE HYDROCHLORIDE 5 MG/1
10 TABLET ORAL EVERY 4 HOURS
Refills: 0 | Status: DISCONTINUED | OUTPATIENT
Start: 2023-07-03 | End: 2023-07-05

## 2023-07-03 RX ADMIN — MYCOPHENOLATE MOFETIL 1500 MILLIGRAM(S): 250 CAPSULE ORAL at 19:28

## 2023-07-03 RX ADMIN — Medication 15 MILLIGRAM(S): at 13:30

## 2023-07-03 RX ADMIN — OXYCODONE HYDROCHLORIDE 5 MILLIGRAM(S): 5 TABLET ORAL at 15:03

## 2023-07-03 RX ADMIN — Medication 0.6 MILLIGRAM(S): at 19:26

## 2023-07-03 RX ADMIN — Medication 600 MILLIGRAM(S): at 15:03

## 2023-07-03 RX ADMIN — GABAPENTIN 300 MILLIGRAM(S): 400 CAPSULE ORAL at 21:20

## 2023-07-03 RX ADMIN — Medication 0.6 MILLIGRAM(S): at 05:00

## 2023-07-03 RX ADMIN — APIXABAN 5 MILLIGRAM(S): 2.5 TABLET, FILM COATED ORAL at 05:00

## 2023-07-03 RX ADMIN — OXYCODONE HYDROCHLORIDE 5 MILLIGRAM(S): 5 TABLET ORAL at 16:00

## 2023-07-03 RX ADMIN — Medication 600 MILLIGRAM(S): at 16:00

## 2023-07-03 RX ADMIN — OXYCODONE HYDROCHLORIDE 5 MILLIGRAM(S): 5 TABLET ORAL at 02:13

## 2023-07-03 RX ADMIN — POLYETHYLENE GLYCOL 3350 17 GRAM(S): 17 POWDER, FOR SOLUTION ORAL at 12:50

## 2023-07-03 RX ADMIN — APIXABAN 5 MILLIGRAM(S): 2.5 TABLET, FILM COATED ORAL at 19:26

## 2023-07-03 RX ADMIN — Medication 600 MILLIGRAM(S): at 21:20

## 2023-07-03 RX ADMIN — Medication 1 MILLIGRAM(S): at 12:50

## 2023-07-03 RX ADMIN — Medication 600 MILLIGRAM(S): at 22:10

## 2023-07-03 RX ADMIN — Medication 6 MILLIGRAM(S): at 05:01

## 2023-07-03 RX ADMIN — MYCOPHENOLATE MOFETIL 1500 MILLIGRAM(S): 250 CAPSULE ORAL at 05:01

## 2023-07-03 RX ADMIN — Medication 600 MILLIGRAM(S): at 05:00

## 2023-07-03 RX ADMIN — OXYCODONE HYDROCHLORIDE 5 MILLIGRAM(S): 5 TABLET ORAL at 07:11

## 2023-07-03 RX ADMIN — Medication 15 MILLIGRAM(S): at 12:53

## 2023-07-03 RX ADMIN — PANTOPRAZOLE SODIUM 40 MILLIGRAM(S): 20 TABLET, DELAYED RELEASE ORAL at 05:01

## 2023-07-03 NOTE — PROGRESS NOTE ADULT - ASSESSMENT
53 yr old female with PMHx APLS, hx ofr stroke in 1992 with residual left sided weakness (on eliquis 5 mg BID), undifferentiated CTD, HbSC, hx of PE (on eliquis) presents presenting from rehab facility with chest pain, SOB, cough, headache. Found to have COVID+. Admitted for further management    #SOB, cough, headache due to COVID  #Pleuritic Chest Pain likely due to Costochondritis in setting of COVID Pneumonia, improving.   - PE ruled out   - YAJAIRA neg, ds-DNA neg.   - RVP: COVID+   - CT chest Angio showed Bibasilar dependent subsegmental areas of atelectasis and/or scarring.  - Echocardio: done, results pending  - EKG: NSR  - cont decadron,  - SP remdeservir  - Cont MFF 1500mg po bid   - c/w ibuprofen 600mg po tid   - Cardio consult appreciated, colchicine, echo, hold a/c if pericardial effusion, cw ibuprophen  - ID consult appreciated  - PT eval: pending  - pain control  - repeat CXR- negative  - CW gabapentin  - check hgb electropheresis  - pain control , increase oxycodone to 10mg prn for severe pain    #Normocytic anemia slightly worse than baseline HbSC/ Hx of stroke /hx of PE   * no signs of bleeding   - cw cellcept and eliquis   - monitor h/H     #stroke in 1992 with residual left sided weakness (on eliquis 5 mg BID), undifferentiated CTD, HbSC, hx of PE (on eliquis)   - resume home meds    #DVT/GI px   pt eval

## 2023-07-03 NOTE — PATIENT PROFILE ADULT - LIVING ENVIRONMENT
Referral received via [facility] from PAIGE Gamble DO who saw patient in [facility] ED 7/18/2019 for extremity paraesthesias.  Patient was encouraged to establish with a PCP.    Writer placed call to patient -message left.      no

## 2023-07-03 NOTE — PROGRESS NOTE ADULT - SUBJECTIVE AND OBJECTIVE BOX
PATRICIO WELDON  53y Female    CHIEF COMPLAINT:    Patient is a 53y old  Female who presents with a chief complaint of Chest pain (28 Jun 2023 08:33)      INTERVAL HPI/OVERNIGHT EVENTS:    Patient seen and examined. No acute events overnight. complaining of diffuse pain    ROS: All other systems are negative.    Vital Signs:    T(F): 98.5 (07-03-23 @ 13:02), Max: 98.5 (07-03-23 @ 13:02)  HR: 78 (07-03-23 @ 13:02) (69 - 87)  BP: 122/56 (07-03-23 @ 13:02) (113/58 - 130/77)  RR: 18 (07-03-23 @ 13:02) (18 - 18)  SpO2: 96% (07-03-23 @ 04:48) (96% - 96%)  I&O's Summary    02 Jul 2023 07:01  -  03 Jul 2023 07:00  --------------------------------------------------------  IN: 1618 mL / OUT: 0 mL / NET: 1618 mL    03 Jul 2023 07:01  -  03 Jul 2023 18:24  --------------------------------------------------------  IN: 680 mL / OUT: 0 mL / NET: 680 mL      Daily     Daily   CAPILLARY BLOOD GLUCOSE          PHYSICAL EXAM:    GENERAL:  NAD  SKIN: No rashes or lesions  HEENT: Atraumatic. Normocephalic. PERRL. Moist membranes.  NECK: Supple, No JVD. No lymphadenopathy.  PULMONARY: CTA B/L. No wheezing. No rales  CVS: Normal S1, S2. Rate and Rhythm are regular. No murmurs.  ABDOMEN/GI: Soft, Nontender, Nondistended; BS present  MSK:  No edema B/L LE.  NEUROLOGIC:  No motor or sensory deficit.  PSYCH: Alert & oriented x 3, normal affect    Consultant(s) Notes Reviewed:  [x ] YES  [ ] NO  Care Discussed with Consultants/Other Providers [ x] YES  [ ] NO    LABS:                        8.5    11.16 )-----------( 384      ( 03 Jul 2023 06:08 )             23.3     07-03    139  |  105  |  15  ----------------------------<  118<H>  3.8   |  24  |  0.6<L>    Ca    8.8      03 Jul 2023 06:08  Mg     1.8     07-03    TPro  5.8<L>  /  Alb  3.3<L>  /  TBili  0.6  /  DBili  0.2  /  AST  9   /  ALT  10  /  AlkPhos  67  07-03    PT/INR - ( 03 Jul 2023 06:08 )   PT: 12.90 sec;   INR: 1.13 ratio                   RADIOLOGY & ADDITIONAL TESTS:      Imaging or report Personally Reviewed:  [x] YES  [ ] NO  EKG reviewed: [x] YES  [ ] NO    Medications:  Standing  apixaban 5 milliGRAM(s) Oral every 12 hours  colchicine 0.6 milliGRAM(s) Oral every 12 hours  dexAMETHasone  Injectable 6 milliGRAM(s) IV Push daily  folic acid 1 milliGRAM(s) Oral daily  gabapentin 300 milliGRAM(s) Oral at bedtime  ibuprofen  Tablet. 600 milliGRAM(s) Oral three times a day  mycophenolate mofetil 1500 milliGRAM(s) Oral two times a day  pantoprazole    Tablet 40 milliGRAM(s) Oral before breakfast  polyethylene glycol 3350 17 Gram(s) Oral daily    PRN Meds  oxyCODONE    IR 5 milliGRAM(s) Oral every 6 hours PRN

## 2023-07-03 NOTE — PATIENT PROFILE ADULT - FALL HARM RISK - HARM RISK INTERVENTIONS

## 2023-07-04 LAB
ANION GAP SERPL CALC-SCNC: 12 MMOL/L — SIGNIFICANT CHANGE UP (ref 7–14)
BASOPHILS # BLD AUTO: 0.01 K/UL — SIGNIFICANT CHANGE UP (ref 0–0.2)
BASOPHILS NFR BLD AUTO: 0.1 % — SIGNIFICANT CHANGE UP (ref 0–1)
BUN SERPL-MCNC: 10 MG/DL — SIGNIFICANT CHANGE UP (ref 10–20)
CALCIUM SERPL-MCNC: 8.7 MG/DL — SIGNIFICANT CHANGE UP (ref 8.4–10.5)
CHLORIDE SERPL-SCNC: 103 MMOL/L — SIGNIFICANT CHANGE UP (ref 98–110)
CO2 SERPL-SCNC: 24 MMOL/L — SIGNIFICANT CHANGE UP (ref 17–32)
CREAT SERPL-MCNC: 0.5 MG/DL — LOW (ref 0.7–1.5)
EGFR: 111 ML/MIN/1.73M2 — SIGNIFICANT CHANGE UP
EOSINOPHIL # BLD AUTO: 0.08 K/UL — SIGNIFICANT CHANGE UP (ref 0–0.7)
EOSINOPHIL NFR BLD AUTO: 0.8 % — SIGNIFICANT CHANGE UP (ref 0–8)
GLUCOSE SERPL-MCNC: 124 MG/DL — HIGH (ref 70–99)
HCT VFR BLD CALC: 24.3 % — LOW (ref 37–47)
HGB BLD-MCNC: 8.7 G/DL — LOW (ref 12–16)
IMM GRANULOCYTES NFR BLD AUTO: 0.6 % — HIGH (ref 0.1–0.3)
LYMPHOCYTES # BLD AUTO: 1.37 K/UL — SIGNIFICANT CHANGE UP (ref 1.2–3.4)
LYMPHOCYTES # BLD AUTO: 13.5 % — LOW (ref 20.5–51.1)
MAGNESIUM SERPL-MCNC: 1.7 MG/DL — LOW (ref 1.8–2.4)
MCHC RBC-ENTMCNC: 32.5 PG — HIGH (ref 27–31)
MCHC RBC-ENTMCNC: 35.8 G/DL — SIGNIFICANT CHANGE UP (ref 32–37)
MCV RBC AUTO: 90.7 FL — SIGNIFICANT CHANGE UP (ref 81–99)
MONOCYTES # BLD AUTO: 1.2 K/UL — HIGH (ref 0.1–0.6)
MONOCYTES NFR BLD AUTO: 11.8 % — HIGH (ref 1.7–9.3)
NEUTROPHILS # BLD AUTO: 7.45 K/UL — HIGH (ref 1.4–6.5)
NEUTROPHILS NFR BLD AUTO: 73.2 % — SIGNIFICANT CHANGE UP (ref 42.2–75.2)
NRBC # BLD: 5 /100 WBCS — HIGH (ref 0–0)
PLATELET # BLD AUTO: 399 K/UL — SIGNIFICANT CHANGE UP (ref 130–400)
PMV BLD: 9.5 FL — SIGNIFICANT CHANGE UP (ref 7.4–10.4)
POTASSIUM SERPL-MCNC: 3.7 MMOL/L — SIGNIFICANT CHANGE UP (ref 3.5–5)
POTASSIUM SERPL-SCNC: 3.7 MMOL/L — SIGNIFICANT CHANGE UP (ref 3.5–5)
RBC # BLD: 2.68 M/UL — LOW (ref 4.2–5.4)
RBC # FLD: 14.4 % — SIGNIFICANT CHANGE UP (ref 11.5–14.5)
SODIUM SERPL-SCNC: 139 MMOL/L — SIGNIFICANT CHANGE UP (ref 135–146)
WBC # BLD: 10.17 K/UL — SIGNIFICANT CHANGE UP (ref 4.8–10.8)
WBC # FLD AUTO: 10.17 K/UL — SIGNIFICANT CHANGE UP (ref 4.8–10.8)

## 2023-07-04 PROCEDURE — 99232 SBSQ HOSP IP/OBS MODERATE 35: CPT

## 2023-07-04 RX ORDER — CELECOXIB 200 MG/1
200 CAPSULE ORAL
Refills: 0 | Status: DISCONTINUED | OUTPATIENT
Start: 2023-07-04 | End: 2023-07-06

## 2023-07-04 RX ORDER — MAGNESIUM SULFATE 500 MG/ML
2 VIAL (ML) INJECTION ONCE
Refills: 0 | Status: COMPLETED | OUTPATIENT
Start: 2023-07-04 | End: 2023-07-04

## 2023-07-04 RX ORDER — ACETAMINOPHEN 500 MG
650 TABLET ORAL ONCE
Refills: 0 | Status: COMPLETED | OUTPATIENT
Start: 2023-07-04 | End: 2023-07-04

## 2023-07-04 RX ADMIN — Medication 650 MILLIGRAM(S): at 07:23

## 2023-07-04 RX ADMIN — Medication 0.6 MILLIGRAM(S): at 06:09

## 2023-07-04 RX ADMIN — PANTOPRAZOLE SODIUM 40 MILLIGRAM(S): 20 TABLET, DELAYED RELEASE ORAL at 06:08

## 2023-07-04 RX ADMIN — Medication 6 MILLIGRAM(S): at 06:08

## 2023-07-04 RX ADMIN — GABAPENTIN 300 MILLIGRAM(S): 400 CAPSULE ORAL at 22:06

## 2023-07-04 RX ADMIN — MYCOPHENOLATE MOFETIL 1500 MILLIGRAM(S): 250 CAPSULE ORAL at 17:20

## 2023-07-04 RX ADMIN — MYCOPHENOLATE MOFETIL 1500 MILLIGRAM(S): 250 CAPSULE ORAL at 06:08

## 2023-07-04 RX ADMIN — Medication 600 MILLIGRAM(S): at 06:08

## 2023-07-04 RX ADMIN — Medication 600 MILLIGRAM(S): at 13:32

## 2023-07-04 RX ADMIN — CELECOXIB 200 MILLIGRAM(S): 200 CAPSULE ORAL at 17:20

## 2023-07-04 RX ADMIN — OXYCODONE HYDROCHLORIDE 10 MILLIGRAM(S): 5 TABLET ORAL at 11:00

## 2023-07-04 RX ADMIN — OXYCODONE HYDROCHLORIDE 10 MILLIGRAM(S): 5 TABLET ORAL at 08:37

## 2023-07-04 RX ADMIN — POLYETHYLENE GLYCOL 3350 17 GRAM(S): 17 POWDER, FOR SOLUTION ORAL at 11:40

## 2023-07-04 RX ADMIN — Medication 1 MILLIGRAM(S): at 11:40

## 2023-07-04 RX ADMIN — Medication 25 GRAM(S): at 11:39

## 2023-07-04 RX ADMIN — APIXABAN 5 MILLIGRAM(S): 2.5 TABLET, FILM COATED ORAL at 17:20

## 2023-07-04 RX ADMIN — Medication 600 MILLIGRAM(S): at 07:23

## 2023-07-04 RX ADMIN — Medication 650 MILLIGRAM(S): at 06:48

## 2023-07-04 RX ADMIN — APIXABAN 5 MILLIGRAM(S): 2.5 TABLET, FILM COATED ORAL at 06:09

## 2023-07-04 RX ADMIN — Medication 0.6 MILLIGRAM(S): at 17:21

## 2023-07-04 NOTE — PROGRESS NOTE ADULT - ASSESSMENT
53 yr old female with PMHx APLS, hx ofr stroke in 1992 with residual left sided weakness (on eliquis 5 mg BID), undifferentiated CTD, HbSC, hx of PE (on eliquis) presents presenting from rehab facility with chest pain, SOB, cough, headache. Found to have COVID+. Admitted for further management    #SOB, cough, headache due to COVID  #Pleuritic Chest Pain likely due to Costochondritis in setting of COVID Pneumonia, improving.   - PE ruled out   - YAJAIRA neg, ds-DNA neg.   Cardio consult appreciated, colchicine, echo, hold a/c if pericardial effusion, cw ibuprofen  Echocardio: < from: TTE Echo Complete w/o Contrast w/ Doppler (07.02.23 @ 12:54) >   Normal global left ventricular systolic function.   2. LV Ejection Fraction by Green's Method with a biplane EF of 67 %.   3. Normal left atrial size.   4. Normal right atrial size.   5. No evidence of mitral valve regurgitation    # RVP: COVID+   - CT chest Angio showed Bibasilar dependent subsegmental areas of atelectasis and/or scarring.  - decadron completed  - SP remdeservir    -# Antiphospholipid synd-- Cont MFF 1500mg po bid -- rheumatology wanted labs--ordered for AM  - c/w ibuprofen 600mg po tid -- change to celecoxib  - CW gabapentin  -  pain control , increase oxycodone to 10mg prn for severe pain    #Normocytic anemia slightly worse than baseline HbSC/ Hx of stroke /hx of PE   * no signs of bleeding   - cw  eliquis   -      #stroke in 1992 with residual left sided weakness (on eliquis 5 mg BID), undifferentiated CTD, HbSC, hx of PE (on eliquis)   - resume home meds    #DVT/GI px   patient still c/o pain-- Pain management in AM

## 2023-07-04 NOTE — PROGRESS NOTE ADULT - SUBJECTIVE AND OBJECTIVE BOX
SUBJECTIVE:    Patient is a 53y old Female who presents with a chief complaint of Chest pain (28 Jun 2023 08:33)    Currently admitted to medicine with the primary diagnosis of Acute pericardial effusion       Today is hospital day 7d.     PAST MEDICAL & SURGICAL HISTORY  Lupus    Sickle cell disease    Stroke    Pulmonary embolism    S/P cholecystectomy      ALLERGIES:  No Known Allergies    MEDICATIONS:  STANDING MEDICATIONS  apixaban 5 milliGRAM(s) Oral every 12 hours  colchicine 0.6 milliGRAM(s) Oral every 12 hours  dexAMETHasone  Injectable 6 milliGRAM(s) IV Push daily  folic acid 1 milliGRAM(s) Oral daily  gabapentin 300 milliGRAM(s) Oral at bedtime  ibuprofen  Tablet. 600 milliGRAM(s) Oral three times a day  mycophenolate mofetil 1500 milliGRAM(s) Oral two times a day  pantoprazole    Tablet 40 milliGRAM(s) Oral before breakfast  polyethylene glycol 3350 17 Gram(s) Oral daily    PRN MEDICATIONS  oxyCODONE    IR 10 milliGRAM(s) Oral every 4 hours PRN  oxyCODONE    IR 5 milliGRAM(s) Oral every 4 hours PRN    VITALS:   T(F): 98.2  HR: 80  BP: 133/69  RR: 18  SpO2: --    LABS:                        8.7    10.17 )-----------( 399      ( 04 Jul 2023 07:57 )             24.3     07-04    139  |  103  |  10  ----------------------------<  124<H>  3.7   |  24  |  0.5<L>    Ca    8.7      04 Jul 2023 07:57  Mg     1.7     07-04    TPro  5.8<L>  /  Alb  3.3<L>  /  TBili  0.6  /  DBili  0.2  /  AST  9   /  ALT  10  /  AlkPhos  67  07-03    PT/INR - ( 03 Jul 2023 06:08 )   PT: 12.90 sec;   INR: 1.13 ratio           Urinalysis Basic - ( 04 Jul 2023 07:57 )    Color: x / Appearance: x / SG: x / pH: x  Gluc: 124 mg/dL / Ketone: x  / Bili: x / Urobili: x   Blood: x / Protein: x / Nitrite: x   Leuk Esterase: x / RBC: x / WBC x   Sq Epi: x / Non Sq Epi: x / Bacteria: x                RADIOLOGY:    PHYSICAL EXAM:  GEN: No acute distress  LUNGS: Clear to auscultation bilaterally   HEART: S1/S2 present. RRR.   ABD/ GI: Soft, non-tender, non-distended. Bowel sounds present  EXT: NC/NC/NE/2+PP/KIMBLE  NEURO: AAOX3

## 2023-07-05 DIAGNOSIS — R52 PAIN, UNSPECIFIED: ICD-10-CM

## 2023-07-05 LAB
ALBUMIN SERPL ELPH-MCNC: 3.6 G/DL — SIGNIFICANT CHANGE UP (ref 3.5–5.2)
ALP SERPL-CCNC: 70 U/L — SIGNIFICANT CHANGE UP (ref 30–115)
ALT FLD-CCNC: 10 U/L — SIGNIFICANT CHANGE UP (ref 0–41)
ANION GAP SERPL CALC-SCNC: 13 MMOL/L — SIGNIFICANT CHANGE UP (ref 7–14)
ANTI-RIBONUCLEAR PROTEIN: <0.2 AI — SIGNIFICANT CHANGE UP
APTT BLD: 27.9 SEC — SIGNIFICANT CHANGE UP (ref 27–39.2)
AST SERPL-CCNC: 9 U/L — SIGNIFICANT CHANGE UP (ref 0–41)
BASOPHILS # BLD AUTO: 0.01 K/UL — SIGNIFICANT CHANGE UP (ref 0–0.2)
BASOPHILS NFR BLD AUTO: 0.1 % — SIGNIFICANT CHANGE UP (ref 0–1)
BILIRUB SERPL-MCNC: 0.7 MG/DL — SIGNIFICANT CHANGE UP (ref 0.2–1.2)
BUN SERPL-MCNC: 12 MG/DL — SIGNIFICANT CHANGE UP (ref 10–20)
CALCIUM SERPL-MCNC: 8.8 MG/DL — SIGNIFICANT CHANGE UP (ref 8.4–10.4)
CHLORIDE SERPL-SCNC: 104 MMOL/L — SIGNIFICANT CHANGE UP (ref 98–110)
CO2 SERPL-SCNC: 25 MMOL/L — SIGNIFICANT CHANGE UP (ref 17–32)
CREAT SERPL-MCNC: 0.6 MG/DL — LOW (ref 0.7–1.5)
EGFR: 107 ML/MIN/1.73M2 — SIGNIFICANT CHANGE UP
ENA SM AB FLD QL: <0.2 AI — SIGNIFICANT CHANGE UP
EOSINOPHIL # BLD AUTO: 0.1 K/UL — SIGNIFICANT CHANGE UP (ref 0–0.7)
EOSINOPHIL NFR BLD AUTO: 0.7 % — SIGNIFICANT CHANGE UP (ref 0–8)
GLUCOSE SERPL-MCNC: 107 MG/DL — HIGH (ref 70–99)
HCT VFR BLD CALC: 25.6 % — LOW (ref 37–47)
HGB A2 MFR BLD: 4 % — HIGH (ref 2–3.2)
HGB BLD-MCNC: 9.3 G/DL — LOW (ref 12–16)
HGB C MFR BLD: 42.9 % — HIGH
HGB F MFR BLD: 3.8 % — HIGH (ref 0–1)
HGB S MFR BLD: 49.3 % — HIGH
IMM GRANULOCYTES NFR BLD AUTO: 0.5 % — HIGH (ref 0.1–0.3)
LYMPHOCYTES # BLD AUTO: 33.4 % — SIGNIFICANT CHANGE UP (ref 20.5–51.1)
LYMPHOCYTES # BLD AUTO: 4.53 K/UL — HIGH (ref 1.2–3.4)
MAGNESIUM SERPL-MCNC: 1.9 MG/DL — SIGNIFICANT CHANGE UP (ref 1.8–2.4)
MCHC RBC-ENTMCNC: 32.7 PG — HIGH (ref 27–31)
MCHC RBC-ENTMCNC: 36.3 G/DL — SIGNIFICANT CHANGE UP (ref 32–37)
MCV RBC AUTO: 90.1 FL — SIGNIFICANT CHANGE UP (ref 81–99)
MONOCYTES # BLD AUTO: 1.68 K/UL — HIGH (ref 0.1–0.6)
MONOCYTES NFR BLD AUTO: 12.4 % — HIGH (ref 1.7–9.3)
NEUTROPHILS # BLD AUTO: 7.19 K/UL — HIGH (ref 1.4–6.5)
NEUTROPHILS NFR BLD AUTO: 52.9 % — SIGNIFICANT CHANGE UP (ref 42.2–75.2)
NRBC # BLD: 5 /100 WBCS — HIGH (ref 0–0)
PLATELET # BLD AUTO: 428 K/UL — HIGH (ref 130–400)
PMV BLD: 9.4 FL — SIGNIFICANT CHANGE UP (ref 7.4–10.4)
POTASSIUM SERPL-MCNC: 3.8 MMOL/L — SIGNIFICANT CHANGE UP (ref 3.5–5)
POTASSIUM SERPL-SCNC: 3.8 MMOL/L — SIGNIFICANT CHANGE UP (ref 3.5–5)
PROT SERPL-MCNC: 6.2 G/DL — SIGNIFICANT CHANGE UP (ref 6–8)
RBC # BLD: 2.84 M/UL — LOW (ref 4.2–5.4)
RBC # FLD: 14.3 % — SIGNIFICANT CHANGE UP (ref 11.5–14.5)
SODIUM SERPL-SCNC: 142 MMOL/L — SIGNIFICANT CHANGE UP (ref 135–146)
WBC # BLD: 13.58 K/UL — HIGH (ref 4.8–10.8)
WBC # FLD AUTO: 13.58 K/UL — HIGH (ref 4.8–10.8)

## 2023-07-05 PROCEDURE — 99223 1ST HOSP IP/OBS HIGH 75: CPT

## 2023-07-05 PROCEDURE — 99232 SBSQ HOSP IP/OBS MODERATE 35: CPT

## 2023-07-05 RX ORDER — ACETAMINOPHEN 500 MG
975 TABLET ORAL EVERY 8 HOURS
Refills: 0 | Status: DISCONTINUED | OUTPATIENT
Start: 2023-07-05 | End: 2023-07-10

## 2023-07-05 RX ORDER — SODIUM CHLORIDE 9 MG/ML
1000 INJECTION, SOLUTION INTRAVENOUS
Refills: 0 | Status: DISCONTINUED | OUTPATIENT
Start: 2023-07-05 | End: 2023-07-07

## 2023-07-05 RX ORDER — DULOXETINE HYDROCHLORIDE 30 MG/1
30 CAPSULE, DELAYED RELEASE ORAL DAILY
Refills: 0 | Status: DISCONTINUED | OUTPATIENT
Start: 2023-07-05 | End: 2023-07-11

## 2023-07-05 RX ORDER — ONDANSETRON 8 MG/1
4 TABLET, FILM COATED ORAL EVERY 8 HOURS
Refills: 0 | Status: COMPLETED | OUTPATIENT
Start: 2023-07-05 | End: 2023-07-08

## 2023-07-05 RX ORDER — SENNA PLUS 8.6 MG/1
2 TABLET ORAL AT BEDTIME
Refills: 0 | Status: DISCONTINUED | OUTPATIENT
Start: 2023-07-05 | End: 2023-07-11

## 2023-07-05 RX ORDER — CYCLOBENZAPRINE HYDROCHLORIDE 10 MG/1
5 TABLET, FILM COATED ORAL THREE TIMES A DAY
Refills: 0 | Status: DISCONTINUED | OUTPATIENT
Start: 2023-07-05 | End: 2023-07-10

## 2023-07-05 RX ADMIN — Medication 975 MILLIGRAM(S): at 15:14

## 2023-07-05 RX ADMIN — Medication 0.6 MILLIGRAM(S): at 06:13

## 2023-07-05 RX ADMIN — MYCOPHENOLATE MOFETIL 1500 MILLIGRAM(S): 250 CAPSULE ORAL at 06:12

## 2023-07-05 RX ADMIN — CELECOXIB 200 MILLIGRAM(S): 200 CAPSULE ORAL at 18:04

## 2023-07-05 RX ADMIN — ONDANSETRON 4 MILLIGRAM(S): 8 TABLET, FILM COATED ORAL at 20:55

## 2023-07-05 RX ADMIN — MYCOPHENOLATE MOFETIL 1500 MILLIGRAM(S): 250 CAPSULE ORAL at 18:04

## 2023-07-05 RX ADMIN — GABAPENTIN 300 MILLIGRAM(S): 400 CAPSULE ORAL at 20:55

## 2023-07-05 RX ADMIN — OXYCODONE HYDROCHLORIDE 10 MILLIGRAM(S): 5 TABLET ORAL at 07:52

## 2023-07-05 RX ADMIN — OXYCODONE HYDROCHLORIDE 10 MILLIGRAM(S): 5 TABLET ORAL at 14:21

## 2023-07-05 RX ADMIN — Medication 1 MILLIGRAM(S): at 11:38

## 2023-07-05 RX ADMIN — APIXABAN 5 MILLIGRAM(S): 2.5 TABLET, FILM COATED ORAL at 18:04

## 2023-07-05 RX ADMIN — PANTOPRAZOLE SODIUM 40 MILLIGRAM(S): 20 TABLET, DELAYED RELEASE ORAL at 06:12

## 2023-07-05 RX ADMIN — Medication 975 MILLIGRAM(S): at 21:45

## 2023-07-05 RX ADMIN — SODIUM CHLORIDE 125 MILLILITER(S): 9 INJECTION, SOLUTION INTRAVENOUS at 11:38

## 2023-07-05 RX ADMIN — Medication 0.6 MILLIGRAM(S): at 18:04

## 2023-07-05 RX ADMIN — CELECOXIB 200 MILLIGRAM(S): 200 CAPSULE ORAL at 06:12

## 2023-07-05 RX ADMIN — Medication 975 MILLIGRAM(S): at 20:55

## 2023-07-05 RX ADMIN — APIXABAN 5 MILLIGRAM(S): 2.5 TABLET, FILM COATED ORAL at 06:12

## 2023-07-05 RX ADMIN — SODIUM CHLORIDE 125 MILLILITER(S): 9 INJECTION, SOLUTION INTRAVENOUS at 20:58

## 2023-07-05 NOTE — PROGRESS NOTE ADULT - SUBJECTIVE AND OBJECTIVE BOX
PATRICIO WELDON  53y, Female  Allergy: No Known Allergies    Hospital Day: 8d    Patient seen and examined. No acute events overnight    PMH/PSH:  PAST MEDICAL & SURGICAL HISTORY:  Lupus      Sickle cell disease      Stroke      Pulmonary embolism      S/P cholecystectomy          VITALS:  T(F): 97.4 (07-05-23 @ 12:47), Max: 97.5 (07-05-23 @ 06:18)  HR: 75 (07-05-23 @ 12:47)  BP: 107/55 (07-05-23 @ 12:47) (107/55 - 122/68)  RR: 17 (07-05-23 @ 12:47)  SpO2: --    TESTS & MEASUREMENTS:  Weight (Kg):   BMI (kg/m2): 21.4 (06-30)    07-03-23 @ 07:01  -  07-04-23 @ 07:00  --------------------------------------------------------  IN: 680 mL / OUT: 0 mL / NET: 680 mL    07-04-23 @ 07:01  -  07-05-23 @ 07:00  --------------------------------------------------------  IN: 476 mL / OUT: 0 mL / NET: 476 mL    07-05-23 @ 07:01  -  07-05-23 @ 13:59  --------------------------------------------------------  IN: 416 mL / OUT: 0 mL / NET: 416 mL                            9.3    13.58 )-----------( 428      ( 05 Jul 2023 07:48 )             25.6     PTT - ( 05 Jul 2023 07:48 )  PTT:27.9 sec  07-05    142  |  104  |  12  ----------------------------<  107<H>  3.8   |  25  |  0.6<L>    Ca    8.8      05 Jul 2023 07:48  Mg     1.9     07-05    TPro  6.2  /  Alb  3.6  /  TBili  0.7  /  DBili  x   /  AST  9   /  ALT  10  /  AlkPhos  70  07-05    LIVER FUNCTIONS - ( 05 Jul 2023 07:48 )  Alb: 3.6 g/dL / Pro: 6.2 g/dL / ALK PHOS: 70 U/L / ALT: 10 U/L / AST: 9 U/L / GGT: x                 Urinalysis Basic - ( 05 Jul 2023 07:48 )    Color: x / Appearance: x / SG: x / pH: x  Gluc: 107 mg/dL / Ketone: x  / Bili: x / Urobili: x   Blood: x / Protein: x / Nitrite: x   Leuk Esterase: x / RBC: x / WBC x   Sq Epi: x / Non Sq Epi: x / Bacteria: x        RADIOLOGY & ADDITIONAL TESTS:    RECENT DIAGNOSTIC ORDERS:      MEDICATIONS:  MEDICATIONS  (STANDING):  apixaban 5 milliGRAM(s) Oral every 12 hours  celecoxib 200 milliGRAM(s) Oral two times a day  colchicine 0.6 milliGRAM(s) Oral every 12 hours  DULoxetine 30 milliGRAM(s) Oral daily  folic acid 1 milliGRAM(s) Oral daily  gabapentin 300 milliGRAM(s) Oral at bedtime  lactated ringers. 1000 milliLiter(s) (125 mL/Hr) IV Continuous <Continuous>  mycophenolate mofetil 1500 milliGRAM(s) Oral two times a day  pantoprazole    Tablet 40 milliGRAM(s) Oral before breakfast  polyethylene glycol 3350 17 Gram(s) Oral daily    MEDICATIONS  (PRN):  cyclobenzaprine 5 milliGRAM(s) Oral three times a day PRN Muscle Spasm  oxyCODONE    IR 10 milliGRAM(s) Oral every 4 hours PRN Severe Pain (7 - 10)  oxyCODONE    IR 5 milliGRAM(s) Oral every 4 hours PRN Moderate Pain (4 - 6)      HOME MEDICATIONS:  Eliquis 5 mg oral tablet (06-28)  mycophenolate mofetil 500 mg oral tablet (06-28)  predniSONE 10 mg oral tablet (06-28)      REVIEW OF SYSTEMS:  All other review of systems is negative unless indicated above.     PHYSICAL EXAM:  PHYSICAL EXAM:  GENERAL: NAD, well-developed  HEAD:  Atraumatic, Normocephalic  NECK: Supple, No JVD  CHEST/LUNG: Clear to auscultation bilaterally; No wheeze  HEART: Regular rate and rhythm; No murmurs, rubs, or gallops  ABDOMEN: Soft, Nontender, Nondistended; Bowel sounds present  EXTREMITIES:  2+ Peripheral Pulses, No clubbing, cyanosis, or edema  SKIN: No rashes or lesions

## 2023-07-05 NOTE — PHYSICAL THERAPY INITIAL EVALUATION ADULT - ADDITIONAL COMMENTS
pt from [facility] for rehab, pt stated she was amb with RW and required assist with some ADLs, prior to rehab, pt lived alone in an apartment, 5 steps to reach elevator and then elevator to 2nd floor, pt amb and performed ADLs I then

## 2023-07-05 NOTE — CONSULT NOTE ADULT - SUBJECTIVE AND OBJECTIVE BOX
PAIN MANAGEMENT CONSULT NOTE    Chief Complaint:    HPI:  51 yr old female with PMHx APLS, hx ofr stroke in 1992 with residual left sided weakness (on eliquis 5 mg BID), undifferentiated CTD, HbSC, hx of PE (on eliquis) presents presenting from rehab facility with chest pain, SOB, cough, headache since Saturday. History goes back to 03/23 where the patient felt weak and had abdominal pain and was admitted to [facility] turned out to have low Hb of 6.3. Patient was then discharged to [facility] with no clear diagnosis. 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 She saw again Dr. Gutierrez, who increased the Mycophenolate to 1500 mg BID. On Saturfay patient woke up with shortness of breath and chest tightness, and back pain. Pain is not exacerbated by exertion. Chest is tender to palpation and pain is more pronounced on deep inspiration. Patient reported severe SOB since saturday. Patient was supposed to be finishing rehab tomorrow and the Rehab center sent her to the ED. Patient denies any urinary symptoms, no palpitations, denies any GI symptoms except an episode of loose stools today morning. Patient was started on prednsione taper on Satur 30 mg PO OD and is now on 10 mg PO OD. Patient is on 2L NC. Patient examination is normal with no crackles, wheezing, murmur, rub, no sign of DVT.     Labs noticeable for WBC of 11.55, Hb 8.9, D-dimer 377, CRP 39, A mildly positive WBC 8   CT chest Angio showed Bibasilar dependent subsegmental areas of atelectasis and/or scarring.  Limited Echo in the ED showed: Small Pericardial Effusion, Decreased EF, RV dilation  EKG: NSR     VS insignificant,   Vital Signs Last 24 Hrs  T(C): 35.8 (27 Jun 2023 16:10), Max: 37.1 (27 Jun 2023 13:54)  T(F): 96.4 (27 Jun 2023 16:10), Max: 98.7 (27 Jun 2023 13:54)  HR: 92 (27 Jun 2023 16:10) (92 - 97)  BP: 102/55 (27 Jun 2023 16:10) (102/55 - 123/63)  RR: 16 (27 Jun 2023 16:10) (16 - 16)  SpO2: 97% (27 Jun 2023 16:10) (97% - 98%)    O2 Parameters below as of 27 Jun 2023 16:10  Patient On (Oxygen Delivery Method): nasal cannula  O2 Flow (L/min): 2       (27 Jun 2023 23:19)      PAST MEDICAL & SURGICAL HISTORY:  Lupus      Sickle cell disease      Stroke      Pulmonary embolism      S/P cholecystectomy          FAMILY HISTORY:      SOCIAL HISTORY:  [ ] Denies Smoking, Alcohol, or Drug Use    HOME MEDICATIONS:   Please refer to initial HNP    PAIN HOME MEDICATIONS:    Allergies    No Known Allergies    Intolerances        PAIN MEDICATIONS:  celecoxib 200 milliGRAM(s) Oral two times a day  gabapentin 300 milliGRAM(s) Oral at bedtime  oxyCODONE    IR 10 milliGRAM(s) Oral every 4 hours PRN  oxyCODONE    IR 5 milliGRAM(s) Oral every 4 hours PRN    Heme:  apixaban 5 milliGRAM(s) Oral every 12 hours    Antibiotics:    Cardiovascular:    GI:  pantoprazole    Tablet 40 milliGRAM(s) Oral before breakfast  polyethylene glycol 3350 17 Gram(s) Oral daily    Endocrine:  colchicine 0.6 milliGRAM(s) Oral every 12 hours    All Other Medications:  folic acid 1 milliGRAM(s) Oral daily  mycophenolate mofetil 1500 milliGRAM(s) Oral two times a day      Vital Signs Last 24 Hrs  T(C): 36.4 (05 Jul 2023 06:18), Max: 36.7 (04 Jul 2023 12:30)  T(F): 97.5 (05 Jul 2023 06:18), Max: 98 (04 Jul 2023 12:30)  HR: 88 (05 Jul 2023 06:18) (75 - 88)  BP: 122/68 (05 Jul 2023 06:18) (107/59 - 122/68)  BP(mean): --  RR: 13 (05 Jul 2023 06:18) (13 - 18)  SpO2: --    Parameters below as of 05 Jul 2023 06:18  Patient On (Oxygen Delivery Method): room air        LABS:                        9.3    13.58 )-----------( 428      ( 05 Jul 2023 07:48 )             25.6     07-04    139  |  103  |  10  ----------------------------<  124<H>  3.7   |  24  |  0.5<L>    Ca    8.7      04 Jul 2023 07:57  Mg     1.7     07-04        Urinalysis Basic - ( 04 Jul 2023 07:57 )    Color: x / Appearance: x / SG: x / pH: x  Gluc: 124 mg/dL / Ketone: x  / Bili: x / Urobili: x   Blood: x / Protein: x / Nitrite: x   Leuk Esterase: x / RBC: x / WBC x   Sq Epi: x / Non Sq Epi: x / Bacteria: x        RADIOLOGY:  Impression:  No evidence of focal consolidation, pleural effusion or pneumothorax.    IMPRESSION:  1.  No evidence of acute pulmonary embolus or acute thoracic pathology.      REVIEW OF SYSTEMS:  CONSTITUTIONAL: No fever, chills. Well appearing  EYES: No eye pain, visual disturbances  NECK: No pain or stiffness  RESPIRATORY: No cough, wheezing; No shortness of breath  CARDIOVASCULAR: + chest pain, + flank pain. No palpitations.   GASTROINTESTINAL: Pt reports + bowel movements. Mild abdominal pain. No nausea, vomiting.   GENITOURINARY: No dysuria, frequency, or incontinence  NEUROLOGICAL: No loss of strength, numbness, or tremors. No dizzinesss or lightheadedness with pain medications.   MUSCULOSKELETAL: No joint pain or swelling; + muscle pain, back pain (mid and lower)      PHYSICAL EXAM  GENERAL: Laying in bed, NAD  Cranial nerves grossly intact  Motor exam: 5/5 except L sided weakness 4/5 UE and LE  CHEST/LUNG: Clear to auscultation bilaterally; No rales, rhonchi, wheezing, or rubs  ABDOMEN: Soft, Nontender, Nondistended; Bowel sounds present  EXTREMITIES:  2+ Peripheral Pulses, No clubbing, cyanosis, or edema  tender to palpation in mid and low back, chest wall tenderness. mild abdominal tenderness  SKIN: No rashes or lesions      ASSESSMENT:   HPI:  51 yr old female with PMHx APLS, hx ofr stroke in 1992 with residual left sided weakness (on eliquis 5 mg BID), undifferentiated CTD, HbSC, hx of PE (on eliquis) presents presenting from rehab facility with chest pain, SOB, cough, headache since Saturday. History goes back to 03/23 where the patient felt weak and had abdominal pain and was admitted to [facility] turned out to have low Hb of 6.3. Patient was then discharged to [facility] with no clear diagnosis. 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 She saw again Dr. Gutierrez, who increased the Mycophenolate to 1500 mg BID. On Saturfay patient woke up with shortness of breath and chest tightness, and back pain. Pain is not exacerbated by exertion. Chest is tender to palpation and pain is more pronounced on deep inspiration. Patient reported severe SOB since saturday. Patient was supposed to be finishing rehab tomorrow and the Rehab center sent her to the ED. Patient denies any urinary symptoms, no palpitations, denies any GI symptoms except an episode of loose stools today morning. Patient was started on prednsione taper on Satur 30 mg PO OD and is now on 10 mg PO OD. Patient is on 2L NC. Patient examination is normal with no crackles, wheezing, murmur, rub, no sign of DVT.     Labs noticeable for WBC of 11.55, Hb 8.9, D-dimer 377, CRP 39, A mildly positive WBC 8   CT chest Angio showed Bibasilar dependent subsegmental areas of atelectasis and/or scarring.  Limited Echo in the ED showed: Small Pericardial Effusion, Decreased EF, RV dilation  EKG: NSR     VS insignificant,   Vital Signs Last 24 Hrs  T(C): 35.8 (27 Jun 2023 16:10), Max: 37.1 (27 Jun 2023 13:54)  T(F): 96.4 (27 Jun 2023 16:10), Max: 98.7 (27 Jun 2023 13:54)  HR: 92 (27 Jun 2023 16:10) (92 - 97)  BP: 102/55 (27 Jun 2023 16:10) (102/55 - 123/63)  RR: 16 (27 Jun 2023 16:10) (16 - 16)  SpO2: 97% (27 Jun 2023 16:10) (97% - 98%)    O2 Parameters below as of 27 Jun 2023 16:10  Patient On (Oxygen Delivery Method): nasal cannula  O2 Flow (L/min): 2    Diffuse generalized pain  r/o Sickle cell crisis    Recommendations  Continue celebrex 200mg q12h PRN. I would stop after 2 days given concurrent use w/ eliquis  300mg gabapentin qHS  tylenol 1000mg q8h standing  add 30mg duloxetine qD  add cyclobenzaprine 5mg q8h PRN  oxycodone 10mg q6h PRN for severe breakthrough pain  Encourage PO intake, hydration  Bowel regimen: miralax / colace  Nausea ppx: zofran standing  Functional goals: oob-chair, ambulate PRN as per primary team  Physical therapy

## 2023-07-05 NOTE — PROGRESS NOTE ADULT - ASSESSMENT
53 yr old female with PMHx APLS, hx ofr stroke in 1992 with residual left sided weakness (on eliquis 5 mg BID), undifferentiated CTD, HbSC, hx of PE (on eliquis) presents presenting from rehab facility with chest pain, SOB, cough, headache. Found to have COVID+. Admitted for further management    #SOB, cough, headache due to COVID  #Pleuritic Chest Pain likely due to Costochondritis in setting of COVID Pneumonia, improving  #POssible sickle cell pain crisis, hx of HbSC disease  PE ruled out   YAJAIRA neg, ds-DNA neg.  Cardio consult appreciated  Echocardio: < from: TTE Echo Complete w/o Contrast w/ Doppler (07.02.23 @ 12:54) >   Normal global left ventricular systolic function.   2. LV Ejection Fraction by Green's Method with a biplane EF of 67 %.   3. Normal left atrial size.   4. Normal right atrial size.   5. No evidence of mitral valve regurgitation  - Cont following pain management recommendations  - Cont IVF    #COVID+   - CT chest Angio showed Bibasilar dependent subsegmental areas of atelectasis and/or scarring.  - decadron completed  - SP remdeservir    -# Antiphospholipid syndrone  Rheumatology on board  - Cont MFF 1500mg po bid  - CW gabapentin  - pain control , increase oxycodone to 10mg prn for severe pain  - Rheum workup has been negative    #Normocytic anemia slightly worse than baseline HbSC/ Hx of stroke /hx of PE   * no signs of bleeding   - cw  eliquis   -      #stroke in 1992 with residual left sided weakness (on eliquis 5 mg BID), undifferentiated CTD, HbSC, hx of PE (on eliquis)   - resume home meds    #DVT/GI px   patient still c/o pain-- Pain management in AM 53 yr old female with PMHx APLS, hx ofr stroke in 1992 with residual left sided weakness (on eliquis 5 mg BID), undifferentiated CTD, HbSC, hx of PE (on eliquis) presents presenting from rehab facility with chest pain, SOB, cough, headache. Found to have COVID+. Admitted for further management    #SOB, cough, headache due to COVID  #Pleuritic Chest Pain likely due to Costochondritis in setting of COVID Pneumonia, improving  #POssible sickle cell pain crisis, hx of HbSC disease  PE ruled out   YAJAIRA neg, ds-DNA neg.  Cardio consult appreciated  Echocardio: < from: TTE Echo Complete w/o Contrast w/ Doppler (07.02.23 @ 12:54) >   Normal global left ventricular systolic function.   2. LV Ejection Fraction by Green's Method with a biplane EF of 67 %.   3. Normal left atrial size.   4. Normal right atrial size.   5. No evidence of mitral valve regurgitation  - Cont following pain management recommendations  - Cont IVF    #COVID+   - CT chest Angio showed Bibasilar dependent subsegmental areas of atelectasis and/or scarring.  - decadron completed  - SP remdeservir    -# Antiphospholipid syndrone  Rheumatology on board  - Cont MFF 1500mg po bid  - CW gabapentin  - pain control , increase oxycodone to 10mg prn for severe pain  - Rheum workup has been negative    #Normocytic anemia slightly worse than baseline HbSC/ Hx of stroke /hx of PE   * no signs of bleeding   - cw  eliquis     #stroke in 1992 with residual left sided weakness (on eliquis 5 mg BID), undifferentiated CTD, HbSC, hx of PE (on eliquis)   - resume home meds    #DVT/GI px   patient still c/o pain-- Pain management in AM 53 yr old female with PMHx APLS, hx ofr stroke in 1992 with residual left sided weakness (on eliquis 5 mg BID), undifferentiated CTD, HbSC, hx of PE (on eliquis) presents presenting from rehab facility with chest pain, SOB, cough, headache. Found to have COVID+. Admitted for further management    #SOB, cough, headache due to COVID  #Pleuritic Chest Pain likely due to Costochondritis in setting of COVID Pneumonia, improving  #Possible sickle cell pain crisis, hx of HbSC disease  PE ruled out   YAJAIRA neg, ds-DNA neg.  Cardio consult appreciated  Echocardio: < from: TTE Echo Complete w/o Contrast w/ Doppler (07.02.23 @ 12:54) >   Normal global left ventricular systolic function.   2. LV Ejection Fraction by Green's Method with a biplane EF of 67 %.   3. Normal left atrial size.   4. Normal right atrial size.   5. No evidence of mitral valve regurgitation  - Cont following pain management recommendations. Added duloxeting and flexeril for further pain control  - Cont IVF  - PT Eval    #COVID+   - CT chest Angio showed Bibasilar dependent subsegmental areas of atelectasis and/or scarring.  - decadron completed  - SP remdeservir    -# Antiphospholipid syndrone  Rheumatology on board  - Cont MFF 1500mg po bid  - CW gabapentin  - pain control , increase oxycodone to 10mg prn for severe pain  - Rheum workup has been negative so far    #Normocytic anemia slightly worse than baseline HbSC/ Hx of stroke /hx of PE   * no signs of bleeding   - cw  eliquis     #stroke in 1992 with residual left sided weakness (on eliquis 5 mg BID), undifferentiated CTD, HbSC, hx of PE (on eliquis)   - resume home meds    #DVT/GI px     #Progress Note Handoff  Pending (specify): Improvement of pain, likely sickle cell pain crisis  Family discussion: dw pt regarding eval/management for pain  Disposition: Home 53 yr old female with PMHx APLS, hx ofr stroke in 1992 with residual left sided weakness (on eliquis 5 mg BID), undifferentiated CTD, HbSC, hx of PE (on eliquis) presents presenting from rehab facility with chest pain, SOB, cough, headache. Found to have COVID+. Admitted for further management    #SOB, cough, headache due to COVID  #Pleuritic Chest Pain likely due to Costochondritis in setting of COVID Pneumonia, improving  #Possible sickle cell pain crisis, hx of HbSC disease  PE ruled out   YAJAIRA neg, ds-DNA neg.  Cardio consult appreciated  Echocardio: < from: TTE Echo Complete w/o Contrast w/ Doppler (07.02.23 @ 12:54) >   Normal global left ventricular systolic function.   2. LV Ejection Fraction by Green's Method with a biplane EF of 67 %.   3. Normal left atrial size.   4. Normal right atrial size.   5. No evidence of mitral valve regurgitation  - Cont following pain management recommendations. Added duloxetine and flexeril for further pain control  - Cont IVF  - PT Eval    #COVID+   - CT chest Angio showed Bibasilar dependent subsegmental areas of atelectasis and/or scarring.  - decadron completed  - SP remdeservir    -# Antiphospholipid syndrone  Rheumatology on board  - Cont MFF 1500mg po bid  - CW gabapentin  - pain control , increase oxycodone to 10mg prn for severe pain  - Rheum workup has been negative so far    #Normocytic anemia slightly worse than baseline HbSC/ Hx of stroke /hx of PE   * no signs of bleeding   - cw  eliquis     #stroke in 1992 with residual left sided weakness (on eliquis 5 mg BID), undifferentiated CTD, HbSC, hx of PE (on eliquis)   - resume home meds    #DVT/GI px     #Progress Note Handoff  Pending (specify): Improvement of pain, likely sickle cell pain crisis  Family discussion: dw pt regarding eval/management for pain  Disposition: Home

## 2023-07-05 NOTE — PHYSICAL THERAPY INITIAL EVALUATION ADULT - GENERAL OBSERVATIONS, REHAB EVAL
8:30-9:15 45 min  pt rec in bed in NAD, pt agreeable to PT, pt c/o 10/10 pain "all over", pt received oxycodone as per RN

## 2023-07-06 LAB
B2 GLYCOPROT1 AB SER QL: NEGATIVE — SIGNIFICANT CHANGE UP
CARDIOLIPIN AB SER-ACNC: NEGATIVE — SIGNIFICANT CHANGE UP
HEMOGLOBIN INTERPRETATION: SIGNIFICANT CHANGE UP
HGB A MFR BLD: 0 % — LOW (ref 95.8–98)

## 2023-07-06 PROCEDURE — 99232 SBSQ HOSP IP/OBS MODERATE 35: CPT

## 2023-07-06 PROCEDURE — 71045 X-RAY EXAM CHEST 1 VIEW: CPT | Mod: 26

## 2023-07-06 RX ORDER — COLCHICINE 0.6 MG
0.6 TABLET ORAL EVERY 12 HOURS
Refills: 0 | Status: DISCONTINUED | OUTPATIENT
Start: 2023-07-06 | End: 2023-07-10

## 2023-07-06 RX ORDER — OXYCODONE HYDROCHLORIDE 5 MG/1
10 TABLET ORAL EVERY 6 HOURS
Refills: 0 | Status: DISCONTINUED | OUTPATIENT
Start: 2023-07-06 | End: 2023-07-08

## 2023-07-06 RX ADMIN — APIXABAN 5 MILLIGRAM(S): 2.5 TABLET, FILM COATED ORAL at 18:13

## 2023-07-06 RX ADMIN — Medication 0.6 MILLIGRAM(S): at 18:14

## 2023-07-06 RX ADMIN — Medication 975 MILLIGRAM(S): at 22:42

## 2023-07-06 RX ADMIN — Medication 0.6 MILLIGRAM(S): at 05:12

## 2023-07-06 RX ADMIN — Medication 1 MILLIGRAM(S): at 13:46

## 2023-07-06 RX ADMIN — SODIUM CHLORIDE 125 MILLILITER(S): 9 INJECTION, SOLUTION INTRAVENOUS at 13:58

## 2023-07-06 RX ADMIN — SODIUM CHLORIDE 125 MILLILITER(S): 9 INJECTION, SOLUTION INTRAVENOUS at 05:19

## 2023-07-06 RX ADMIN — MYCOPHENOLATE MOFETIL 1500 MILLIGRAM(S): 250 CAPSULE ORAL at 18:14

## 2023-07-06 RX ADMIN — OXYCODONE HYDROCHLORIDE 10 MILLIGRAM(S): 5 TABLET ORAL at 13:46

## 2023-07-06 RX ADMIN — ONDANSETRON 4 MILLIGRAM(S): 8 TABLET, FILM COATED ORAL at 05:12

## 2023-07-06 RX ADMIN — SENNA PLUS 2 TABLET(S): 8.6 TABLET ORAL at 22:42

## 2023-07-06 RX ADMIN — DULOXETINE HYDROCHLORIDE 30 MILLIGRAM(S): 30 CAPSULE, DELAYED RELEASE ORAL at 13:48

## 2023-07-06 RX ADMIN — OXYCODONE HYDROCHLORIDE 10 MILLIGRAM(S): 5 TABLET ORAL at 15:47

## 2023-07-06 RX ADMIN — ONDANSETRON 4 MILLIGRAM(S): 8 TABLET, FILM COATED ORAL at 22:42

## 2023-07-06 RX ADMIN — CYCLOBENZAPRINE HYDROCHLORIDE 5 MILLIGRAM(S): 10 TABLET, FILM COATED ORAL at 18:13

## 2023-07-06 RX ADMIN — GABAPENTIN 300 MILLIGRAM(S): 400 CAPSULE ORAL at 22:42

## 2023-07-06 RX ADMIN — SODIUM CHLORIDE 125 MILLILITER(S): 9 INJECTION, SOLUTION INTRAVENOUS at 20:30

## 2023-07-06 NOTE — PROGRESS NOTE ADULT - ASSESSMENT
53 yr old female with PMHx APLS, hx ofr stroke in 1992 with residual left sided weakness (on eliquis 5 mg BID), undifferentiated CTD, HbSC, hx of PE (on eliquis) presents presenting from rehab facility with chest pain, SOB, cough, headache. Found to have COVID+. Admitted for further management    #Pleuritic Chest Pain likely due to Costochondritis vs pericarditis in setting of COVID Pneumonia, improving  #Possible sickle cell pain crisis, hx of HbSC disease  PE ruled out   YAJAIRA neg, ds-DNA neg.  Cardio consult appreciated. Pt treated for suspected pericarditis  Echocardio (07/02): Normal EF  - Cont following pain management recommendations. Added duloxetine and flexeril for further pain control. Oxycodone PRN  - Cont IVF  - PT Eval  - Cont colchicine 0.6 BID    #Nausea/Vomiting  Possibly related to celecoxib  r/o mesenteric ischemia  - f/u CT Angio abdomen/pelvis  - Symptomatic tx    #COVID+   - CT chest Angio showed Bibasilar dependent subsegmental areas of atelectasis and/or scarring.  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 Overall suspicion for autoimmune disease causing her symptoms are low  Pt was previously told she had undifferentiated CTD. 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 Unclear how dx was made as her workup here has been negative She has been on mycophenolate outpatient  - Cont MFF 1500mg po bid  - CW gabapentin  - Resend ESR/CRP  - pain control , increase oxycodone to 10mg prn for severe pain    #Normocytic anemia slightly worse than baseline HbSC/ Hx of stroke /hx of PE   * no signs of bleeding   - cw  eliquis     #stroke in 1992 with residual left sided weakness (on eliquis 5 mg BID), , HbSC, hx of PE (on eliquis)   - resume home meds    #DVT/GI px     #Progress Note Handoff  Pending (specify): Improvement of pain, likely sickle cell pain crisis  Family discussion: tesha pt regarding eval/management for pain  Disposition: Home

## 2023-07-06 NOTE — PROGRESS NOTE ADULT - SUBJECTIVE AND OBJECTIVE BOX
PATRICIO WELDON  53y, Female  Allergy: No Known Allergies    Hospital Day: 9d    Patient seen and examined. No acute events overnight. Pt now with intractable nausea/vomiting with meals.    PMH/PSH:  PAST MEDICAL & SURGICAL HISTORY:  Lupus      Sickle cell disease      Stroke      Pulmonary embolism      S/P cholecystectomy          VITALS:  T(F): 97 (07-06-23 @ 05:11), Max: 97.4 (07-05-23 @ 12:47)  HR: 77 (07-06-23 @ 05:11)  BP: 122/74 (07-06-23 @ 05:11) (107/55 - 122/74)  RR: 18 (07-06-23 @ 05:11)  SpO2: 98% (07-06-23 @ 08:38)    TESTS & MEASUREMENTS:  Weight (Kg):       07-04-23 @ 07:01  -  07-05-23 @ 07:00  --------------------------------------------------------  IN: 476 mL / OUT: 0 mL / NET: 476 mL    07-05-23 @ 07:01  -  07-06-23 @ 07:00  --------------------------------------------------------  IN: 1416 mL / OUT: 300 mL / NET: 1116 mL    07-06-23 @ 07:01  -  07-06-23 @ 12:06  --------------------------------------------------------  IN: 120 mL / OUT: 650 mL / NET: -530 mL                            9.3    13.58 )-----------( 428      ( 05 Jul 2023 07:48 )             25.6     PTT - ( 05 Jul 2023 07:48 )  PTT:27.9 sec  07-05    142  |  104  |  12  ----------------------------<  107<H>  3.8   |  25  |  0.6<L>    Ca    8.8      05 Jul 2023 07:48  Mg     1.9     07-05    TPro  6.2  /  Alb  3.6  /  TBili  0.7  /  DBili  x   /  AST  9   /  ALT  10  /  AlkPhos  70  07-05    LIVER FUNCTIONS - ( 05 Jul 2023 07:48 )  Alb: 3.6 g/dL / Pro: 6.2 g/dL / ALK PHOS: 70 U/L / ALT: 10 U/L / AST: 9 U/L / GGT: x                 Urinalysis Basic - ( 05 Jul 2023 07:48 )    Color: x / Appearance: x / SG: x / pH: x  Gluc: 107 mg/dL / Ketone: x  / Bili: x / Urobili: x   Blood: x / Protein: x / Nitrite: x   Leuk Esterase: x / RBC: x / WBC x   Sq Epi: x / Non Sq Epi: x / Bacteria: x        RADIOLOGY & ADDITIONAL TESTS:    RECENT DIAGNOSTIC ORDERS:  CT Angio Abdomen and Pelvis w/ IV Cont: Routine   Indication: nausea/vomiting  Transport: [facility] (07-06-23 @ 12:04)  Sedimentation Rate, Erythrocyte: AM Sched. Collection: 07-Jul-2023 04:30 (07-06-23 @ 12:04)  C-Reactive Protein, Serum: AM Sched. Collection: 07-Jul-2023 04:30 (07-06-23 @ 12:04)  Magnesium: AM Sched. Collection: 07-Jul-2023 04:30 (07-06-23 @ 11:51)  Basic Metabolic Panel: AM Sched. Collection: 07-Jul-2023 04:30 (07-06-23 @ 11:51)  Complete Blood Count + Automated Diff: AM Sched. Collection: 07-Jul-2023 04:30 (07-06-23 @ 11:51)  CT Abdomen and Pelvis w/ IV Cont: Routine   Indication: nausea/vomiting  Transport: [facility] (07-06-23 @ 09:56)  12 Lead ECG (07-05-23 @ 15:37)      MEDICATIONS:  MEDICATIONS  (STANDING):  acetaminophen     Tablet .. 975 milliGRAM(s) Oral every 8 hours  apixaban 5 milliGRAM(s) Oral every 12 hours  colchicine 0.6 milliGRAM(s) Oral every 12 hours  DULoxetine 30 milliGRAM(s) Oral daily  folic acid 1 milliGRAM(s) Oral daily  gabapentin 300 milliGRAM(s) Oral at bedtime  lactated ringers. 1000 milliLiter(s) (125 mL/Hr) IV Continuous <Continuous>  mycophenolate mofetil 1500 milliGRAM(s) Oral two times a day  ondansetron Injectable 4 milliGRAM(s) IV Push every 8 hours  pantoprazole    Tablet 40 milliGRAM(s) Oral before breakfast  polyethylene glycol 3350 17 Gram(s) Oral daily  senna 2 Tablet(s) Oral at bedtime    MEDICATIONS  (PRN):  cyclobenzaprine 5 milliGRAM(s) Oral three times a day PRN Muscle Spasm  oxycodone    5 mG/acetaminophen 325 mG 2 Tablet(s) Oral every 6 hours PRN Severe Pain (7 - 10)      HOME MEDICATIONS:  Eliquis 5 mg oral tablet (06-28)  mycophenolate mofetil 500 mg oral tablet (06-28)  predniSONE 10 mg oral tablet (06-28)      REVIEW OF SYSTEMS:  All other review of systems is negative unless indicated above.     PHYSICAL EXAM:  PHYSICAL EXAM:  GENERAL: NAD, well-developed  HEAD:  Atraumatic, Normocephalic  NECK: Supple, No JVD  CHEST/LUNG: Clear to auscultation bilaterally; No wheeze  HEART: Regular rate and rhythm; No murmurs, rubs, or gallops  ABDOMEN: Soft, Nontender, Nondistended; Bowel sounds present  EXTREMITIES:  2+ Peripheral Pulses, No clubbing, cyanosis, or edema  SKIN: No rashes or lesions

## 2023-07-07 LAB
ANION GAP SERPL CALC-SCNC: 8 MMOL/L — SIGNIFICANT CHANGE UP (ref 7–14)
BASOPHILS # BLD AUTO: 0.02 K/UL — SIGNIFICANT CHANGE UP (ref 0–0.2)
BASOPHILS NFR BLD AUTO: 0.2 % — SIGNIFICANT CHANGE UP (ref 0–1)
BUN SERPL-MCNC: 7 MG/DL — LOW (ref 10–20)
CALCIUM SERPL-MCNC: 8.6 MG/DL — SIGNIFICANT CHANGE UP (ref 8.4–10.5)
CHLORIDE SERPL-SCNC: 103 MMOL/L — SIGNIFICANT CHANGE UP (ref 98–110)
CO2 SERPL-SCNC: 28 MMOL/L — SIGNIFICANT CHANGE UP (ref 17–32)
CREAT SERPL-MCNC: 0.6 MG/DL — LOW (ref 0.7–1.5)
CRP SERPL-MCNC: 23.9 MG/L — HIGH
DRVVT RATIO: 0.7 RATIO — SIGNIFICANT CHANGE UP (ref 0–1.21)
DRVVT RATIO: 0.7 RATIO — SIGNIFICANT CHANGE UP (ref 0–1.21)
DRVVT SCREEN TO CONFIRM RATIO: SIGNIFICANT CHANGE UP
DRVVT SCREEN TO CONFIRM RATIO: SIGNIFICANT CHANGE UP
EGFR: 107 ML/MIN/1.73M2 — SIGNIFICANT CHANGE UP
EOSINOPHIL # BLD AUTO: 0.24 K/UL — SIGNIFICANT CHANGE UP (ref 0–0.7)
EOSINOPHIL NFR BLD AUTO: 2.6 % — SIGNIFICANT CHANGE UP (ref 0–8)
ERYTHROCYTE [SEDIMENTATION RATE] IN BLOOD: 15 MM/HR — SIGNIFICANT CHANGE UP (ref 0–20)
GLUCOSE SERPL-MCNC: 109 MG/DL — HIGH (ref 70–99)
HCT VFR BLD CALC: 22.6 % — LOW (ref 37–47)
HGB BLD-MCNC: 8.3 G/DL — LOW (ref 12–16)
IMM GRANULOCYTES NFR BLD AUTO: 0.7 % — HIGH (ref 0.1–0.3)
LYMPHOCYTES # BLD AUTO: 1.96 K/UL — SIGNIFICANT CHANGE UP (ref 1.2–3.4)
LYMPHOCYTES # BLD AUTO: 20.9 % — SIGNIFICANT CHANGE UP (ref 20.5–51.1)
MAGNESIUM SERPL-MCNC: 1.7 MG/DL — LOW (ref 1.8–2.4)
MCHC RBC-ENTMCNC: 33.6 PG — HIGH (ref 27–31)
MCHC RBC-ENTMCNC: 36.7 G/DL — SIGNIFICANT CHANGE UP (ref 32–37)
MCV RBC AUTO: 91.5 FL — SIGNIFICANT CHANGE UP (ref 81–99)
MONOCYTES # BLD AUTO: 1.07 K/UL — HIGH (ref 0.1–0.6)
MONOCYTES NFR BLD AUTO: 11.4 % — HIGH (ref 1.7–9.3)
NEUTROPHILS # BLD AUTO: 6.02 K/UL — SIGNIFICANT CHANGE UP (ref 1.4–6.5)
NEUTROPHILS NFR BLD AUTO: 64.2 % — SIGNIFICANT CHANGE UP (ref 42.2–75.2)
NORMALIZED SCT PPP-RTO: 0.79 RATIO — SIGNIFICANT CHANGE UP (ref 0–1.16)
NORMALIZED SCT PPP-RTO: 0.82 RATIO — SIGNIFICANT CHANGE UP (ref 0–1.16)
NORMALIZED SCT PPP-RTO: SIGNIFICANT CHANGE UP
NORMALIZED SCT PPP-RTO: SIGNIFICANT CHANGE UP
NRBC # BLD: 6 /100 WBCS — HIGH (ref 0–0)
PLATELET # BLD AUTO: 295 K/UL — SIGNIFICANT CHANGE UP (ref 130–400)
PMV BLD: 9.3 FL — SIGNIFICANT CHANGE UP (ref 7.4–10.4)
POTASSIUM SERPL-MCNC: 3.7 MMOL/L — SIGNIFICANT CHANGE UP (ref 3.5–5)
POTASSIUM SERPL-SCNC: 3.7 MMOL/L — SIGNIFICANT CHANGE UP (ref 3.5–5)
RBC # BLD: 2.47 M/UL — LOW (ref 4.2–5.4)
RBC # FLD: 14.2 % — SIGNIFICANT CHANGE UP (ref 11.5–14.5)
SODIUM SERPL-SCNC: 139 MMOL/L — SIGNIFICANT CHANGE UP (ref 135–146)
WBC # BLD: 9.38 K/UL — SIGNIFICANT CHANGE UP (ref 4.8–10.8)
WBC # FLD AUTO: 9.38 K/UL — SIGNIFICANT CHANGE UP (ref 4.8–10.8)

## 2023-07-07 PROCEDURE — 99232 SBSQ HOSP IP/OBS MODERATE 35: CPT

## 2023-07-07 PROCEDURE — 74174 CTA ABD&PLVS W/CONTRAST: CPT | Mod: 26

## 2023-07-07 RX ADMIN — APIXABAN 5 MILLIGRAM(S): 2.5 TABLET, FILM COATED ORAL at 05:51

## 2023-07-07 RX ADMIN — GABAPENTIN 300 MILLIGRAM(S): 400 CAPSULE ORAL at 22:28

## 2023-07-07 RX ADMIN — Medication 975 MILLIGRAM(S): at 13:43

## 2023-07-07 RX ADMIN — MYCOPHENOLATE MOFETIL 1500 MILLIGRAM(S): 250 CAPSULE ORAL at 17:49

## 2023-07-07 RX ADMIN — Medication 0.6 MILLIGRAM(S): at 05:51

## 2023-07-07 RX ADMIN — OXYCODONE HYDROCHLORIDE 10 MILLIGRAM(S): 5 TABLET ORAL at 06:04

## 2023-07-07 RX ADMIN — APIXABAN 5 MILLIGRAM(S): 2.5 TABLET, FILM COATED ORAL at 17:49

## 2023-07-07 RX ADMIN — OXYCODONE HYDROCHLORIDE 10 MILLIGRAM(S): 5 TABLET ORAL at 20:32

## 2023-07-07 RX ADMIN — Medication 1 MILLIGRAM(S): at 12:18

## 2023-07-07 RX ADMIN — Medication 975 MILLIGRAM(S): at 22:28

## 2023-07-07 RX ADMIN — OXYCODONE HYDROCHLORIDE 10 MILLIGRAM(S): 5 TABLET ORAL at 12:28

## 2023-07-07 RX ADMIN — Medication 975 MILLIGRAM(S): at 05:50

## 2023-07-07 RX ADMIN — ONDANSETRON 4 MILLIGRAM(S): 8 TABLET, FILM COATED ORAL at 22:28

## 2023-07-07 RX ADMIN — ONDANSETRON 4 MILLIGRAM(S): 8 TABLET, FILM COATED ORAL at 05:51

## 2023-07-07 RX ADMIN — PANTOPRAZOLE SODIUM 40 MILLIGRAM(S): 20 TABLET, DELAYED RELEASE ORAL at 05:51

## 2023-07-07 RX ADMIN — DULOXETINE HYDROCHLORIDE 30 MILLIGRAM(S): 30 CAPSULE, DELAYED RELEASE ORAL at 12:18

## 2023-07-07 RX ADMIN — OXYCODONE HYDROCHLORIDE 10 MILLIGRAM(S): 5 TABLET ORAL at 13:00

## 2023-07-07 RX ADMIN — SODIUM CHLORIDE 125 MILLILITER(S): 9 INJECTION, SOLUTION INTRAVENOUS at 05:50

## 2023-07-07 RX ADMIN — ONDANSETRON 4 MILLIGRAM(S): 8 TABLET, FILM COATED ORAL at 13:43

## 2023-07-07 RX ADMIN — Medication 0.6 MILLIGRAM(S): at 17:49

## 2023-07-07 RX ADMIN — MYCOPHENOLATE MOFETIL 1500 MILLIGRAM(S): 250 CAPSULE ORAL at 05:51

## 2023-07-07 RX ADMIN — Medication 975 MILLIGRAM(S): at 22:58

## 2023-07-07 NOTE — PROGRESS NOTE ADULT - ASSESSMENT
53 yr old female with PMHx APLS, hx ofr stroke in 1992 with residual left sided weakness (on eliquis 5 mg BID), undifferentiated CTD, HbSC, hx of PE (on eliquis) presents presenting from rehab facility with chest pain, SOB, cough, headache. Found to have COVID+. Admitted for further management    #Pleuritic Chest Pain likely due to Costochondritis vs pericarditis in setting of COVID Pneumonia  #Diffuse body pain, possible sickle cell pain crisis vs mylagias from viral infection  #hx of HbSC disease  PE ruled out   YAJAIRA neg, ds-DNA neg.  Cardio consult appreciated. Pt treated for suspected pericarditis  Echocardio (07/02): Normal EF  PT continues to have diffuse pain. So far, workup was unrevealing of acute pathology. Her diffuse myalgias may be related to COVID infection  - Cont following pain management recommendations. Added duloxetine and flexeril for further pain control. Oxycodone PRN  - Cont IVF  - PT Eval  - Cont colchicine 0.6 BID, will f/u with cardio if needed to be continued  - Can DC telemetry    #Nausea/Vomiting  Possibly related to celecoxib. Improved after stopping med  r/o mesenteric ischemia  - f/u CT Angio abdomen/pelvis  - Symptomatic tx    #COVID+   CT chest Angio showed Bibasilar dependent subsegmental areas of atelectasis and/or scarring.  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 Overall suspicion for autoimmune disease causing her symptoms are low  Pt was previously told she had undifferentiated CTD. 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 Unclear how dx was made as her workup here has been negative She has been on mycophenolate outpatient  - Cont MFF 1500mg po bid - mycophenolate levels in lab  - CW gabapentin  - Resend ESR/CRP  - pain control , increase oxycodone to 10mg prn for severe pain    #Normocytic anemia slightly worse than baseline HbSC/ Hx of stroke /hx of PE   * no signs of bleeding   - cw  eliquis     #stroke in 1992 with residual left sided weakness (on eliquis 5 mg BID),  #hx of PE (on eliquis)   - resume home meds    #DVT/GI px     #Progress Note Handoff  Pending (specify): Improvement of pain possibly sickle cell pain crisis  Family discussion: dw pt regarding eval/management for pain  Disposition: Home

## 2023-07-07 NOTE — PROGRESS NOTE ADULT - SUBJECTIVE AND OBJECTIVE BOX
PATRICIO WELDON  53y, Female  Allergy: No Known Allergies    Hospital Day: 10d    Patient seen and examined. No acute events overnight    PMH/PSH:  PAST MEDICAL & SURGICAL HISTORY:  Lupus      Sickle cell disease      Stroke      Pulmonary embolism      S/P cholecystectomy          VITALS:  T(F): 96.8 (07-07-23 @ 04:59), Max: 98.7 (07-06-23 @ 19:46)  HR: 87 (07-07-23 @ 04:59)  BP: 111/56 (07-07-23 @ 04:59) (109/55 - 164/52)  RR: 18 (07-07-23 @ 04:59)  SpO2: 97% (07-06-23 @ 13:58)    TESTS & MEASUREMENTS:  Weight (Kg):       07-05-23 @ 07:01  -  07-06-23 @ 07:00  --------------------------------------------------------  IN: 1416 mL / OUT: 300 mL / NET: 1116 mL    07-06-23 @ 07:01  -  07-07-23 @ 07:00  --------------------------------------------------------  IN: 606 mL / OUT: 1375 mL / NET: -769 mL                            8.3    9.38  )-----------( 295      ( 07 Jul 2023 07:01 )             22.6       07-07    139  |  103  |  7<L>  ----------------------------<  109<H>  3.7   |  28  |  0.6<L>    Ca    8.6      07 Jul 2023 07:01  Mg     1.7     07-07              Urinalysis Basic - ( 07 Jul 2023 07:01 )    Color: x / Appearance: x / SG: x / pH: x  Gluc: 109 mg/dL / Ketone: x  / Bili: x / Urobili: x   Blood: x / Protein: x / Nitrite: x   Leuk Esterase: x / RBC: x / WBC x   Sq Epi: x / Non Sq Epi: x / Bacteria: x        RADIOLOGY & ADDITIONAL TESTS:    RECENT DIAGNOSTIC ORDERS:  CT Angio Abdomen and Pelvis w/ IV Cont: Routine   Indication: nausea/vomiting  Transport: [facility] (07-06-23 @ 12:04)      MEDICATIONS:  MEDICATIONS  (STANDING):  acetaminophen     Tablet .. 975 milliGRAM(s) Oral every 8 hours  apixaban 5 milliGRAM(s) Oral every 12 hours  colchicine 0.6 milliGRAM(s) Oral every 12 hours  DULoxetine 30 milliGRAM(s) Oral daily  folic acid 1 milliGRAM(s) Oral daily  gabapentin 300 milliGRAM(s) Oral at bedtime  lactated ringers. 1000 milliLiter(s) (125 mL/Hr) IV Continuous <Continuous>  mycophenolate mofetil 1500 milliGRAM(s) Oral two times a day  ondansetron Injectable 4 milliGRAM(s) IV Push every 8 hours  pantoprazole    Tablet 40 milliGRAM(s) Oral before breakfast  polyethylene glycol 3350 17 Gram(s) Oral daily  senna 2 Tablet(s) Oral at bedtime    MEDICATIONS  (PRN):  cyclobenzaprine 5 milliGRAM(s) Oral three times a day PRN Muscle Spasm  oxyCODONE    IR 10 milliGRAM(s) Oral every 6 hours PRN Severe Pain (7 - 10)      HOME MEDICATIONS:  Eliquis 5 mg oral tablet (06-28)  mycophenolate mofetil 500 mg oral tablet (06-28)  predniSONE 10 mg oral tablet (06-28)      REVIEW OF SYSTEMS:  All other review of systems is negative unless indicated above.     PHYSICAL EXAM:  PHYSICAL EXAM:  GENERAL: NAD, well-developed  HEAD:  Atraumatic, Normocephalic  NECK: Supple, No JVD  CHEST/LUNG: Clear to auscultation bilaterally; No wheeze  HEART: Regular rate and rhythm; No murmurs, rubs, or gallops  ABDOMEN: Soft, Nontender, Nondistended; Bowel sounds present  EXTREMITIES:  2+ Peripheral Pulses, No clubbing, cyanosis, or edema  SKIN: No rashes or lesions

## 2023-07-08 ENCOUNTER — TRANSCRIPTION ENCOUNTER (OUTPATIENT)
Age: 53
End: 2023-07-08

## 2023-07-08 LAB
ALBUMIN SERPL ELPH-MCNC: 3.1 G/DL — LOW (ref 3.5–5.2)
ALP SERPL-CCNC: 76 U/L — SIGNIFICANT CHANGE UP (ref 30–115)
ALT FLD-CCNC: 14 U/L — SIGNIFICANT CHANGE UP (ref 0–41)
ANA TITR SER: NEGATIVE — SIGNIFICANT CHANGE UP
ANION GAP SERPL CALC-SCNC: 10 MMOL/L — SIGNIFICANT CHANGE UP (ref 7–14)
ANISOCYTOSIS BLD QL: SIGNIFICANT CHANGE UP
AST SERPL-CCNC: 16 U/L — SIGNIFICANT CHANGE UP (ref 0–41)
BASOPHILS # BLD AUTO: 0 K/UL — SIGNIFICANT CHANGE UP (ref 0–0.2)
BASOPHILS NFR BLD AUTO: 0 % — SIGNIFICANT CHANGE UP (ref 0–1)
BILIRUB SERPL-MCNC: 1 MG/DL — SIGNIFICANT CHANGE UP (ref 0.2–1.2)
BUN SERPL-MCNC: 6 MG/DL — LOW (ref 10–20)
CALCIUM SERPL-MCNC: 8.4 MG/DL — SIGNIFICANT CHANGE UP (ref 8.4–10.5)
CHLORIDE SERPL-SCNC: 102 MMOL/L — SIGNIFICANT CHANGE UP (ref 98–110)
CO2 SERPL-SCNC: 27 MMOL/L — SIGNIFICANT CHANGE UP (ref 17–32)
CREAT SERPL-MCNC: 0.6 MG/DL — LOW (ref 0.7–1.5)
DSDNA AB SER QL CLIF: NEGATIVE — SIGNIFICANT CHANGE UP
EGFR: 107 ML/MIN/1.73M2 — SIGNIFICANT CHANGE UP
EOSINOPHIL # BLD AUTO: 0.09 K/UL — SIGNIFICANT CHANGE UP (ref 0–0.7)
EOSINOPHIL NFR BLD AUTO: 0.9 % — SIGNIFICANT CHANGE UP (ref 0–8)
GIANT PLATELETS BLD QL SMEAR: PRESENT — SIGNIFICANT CHANGE UP
GLUCOSE SERPL-MCNC: 97 MG/DL — SIGNIFICANT CHANGE UP (ref 70–99)
HCT VFR BLD CALC: 23.3 % — LOW (ref 37–47)
HGB BLD-MCNC: 8.3 G/DL — LOW (ref 12–16)
HYPOCHROMIA BLD QL: SIGNIFICANT CHANGE UP
LYMPHOCYTES # BLD AUTO: 1.53 K/UL — SIGNIFICANT CHANGE UP (ref 1.2–3.4)
LYMPHOCYTES # BLD AUTO: 15.6 % — LOW (ref 20.5–51.1)
MACROCYTES BLD QL: SIGNIFICANT CHANGE UP
MAGNESIUM SERPL-MCNC: 1.8 MG/DL — SIGNIFICANT CHANGE UP (ref 1.8–2.4)
MANUAL SMEAR VERIFICATION: SIGNIFICANT CHANGE UP
MCHC RBC-ENTMCNC: 32 PG — HIGH (ref 27–31)
MCHC RBC-ENTMCNC: 35.6 G/DL — SIGNIFICANT CHANGE UP (ref 32–37)
MCV RBC AUTO: 90 FL — SIGNIFICANT CHANGE UP (ref 81–99)
MONOCYTES # BLD AUTO: 0.94 K/UL — HIGH (ref 0.1–0.6)
MONOCYTES NFR BLD AUTO: 9.6 % — HIGH (ref 1.7–9.3)
MYELOCYTES NFR BLD: 0.9 % — HIGH (ref 0–0)
NEUTROPHILS # BLD AUTO: 7.17 K/UL — HIGH (ref 1.4–6.5)
NEUTROPHILS NFR BLD AUTO: 73 % — SIGNIFICANT CHANGE UP (ref 42.2–75.2)
NRBC # BLD: 6 /100 — HIGH (ref 0–0)
NRBC # BLD: SIGNIFICANT CHANGE UP /100 WBCS (ref 0–0)
PLAT MORPH BLD: NORMAL — SIGNIFICANT CHANGE UP
PLATELET # BLD AUTO: 309 K/UL — SIGNIFICANT CHANGE UP (ref 130–400)
PMV BLD: 9.7 FL — SIGNIFICANT CHANGE UP (ref 7.4–10.4)
POIKILOCYTOSIS BLD QL AUTO: SIGNIFICANT CHANGE UP
POLYCHROMASIA BLD QL SMEAR: SIGNIFICANT CHANGE UP
POTASSIUM SERPL-MCNC: 3.8 MMOL/L — SIGNIFICANT CHANGE UP (ref 3.5–5)
POTASSIUM SERPL-SCNC: 3.8 MMOL/L — SIGNIFICANT CHANGE UP (ref 3.5–5)
PROT SERPL-MCNC: 5.4 G/DL — LOW (ref 6–8)
RBC # BLD: 2.59 M/UL — LOW (ref 4.2–5.4)
RBC # FLD: 13.9 % — SIGNIFICANT CHANGE UP (ref 11.5–14.5)
RBC BLD AUTO: ABNORMAL
SODIUM SERPL-SCNC: 139 MMOL/L — SIGNIFICANT CHANGE UP (ref 135–146)
TARGETS BLD QL SMEAR: SIGNIFICANT CHANGE UP
WBC # BLD: 9.82 K/UL — SIGNIFICANT CHANGE UP (ref 4.8–10.8)
WBC # FLD AUTO: 9.82 K/UL — SIGNIFICANT CHANGE UP (ref 4.8–10.8)

## 2023-07-08 PROCEDURE — 99232 SBSQ HOSP IP/OBS MODERATE 35: CPT

## 2023-07-08 PROCEDURE — 93010 ELECTROCARDIOGRAM REPORT: CPT

## 2023-07-08 PROCEDURE — 71045 X-RAY EXAM CHEST 1 VIEW: CPT | Mod: 26

## 2023-07-08 RX ORDER — OXYCODONE HYDROCHLORIDE 5 MG/1
10 TABLET ORAL EVERY 4 HOURS
Refills: 0 | Status: DISCONTINUED | OUTPATIENT
Start: 2023-07-08 | End: 2023-07-10

## 2023-07-08 RX ADMIN — Medication 975 MILLIGRAM(S): at 06:49

## 2023-07-08 RX ADMIN — SENNA PLUS 2 TABLET(S): 8.6 TABLET ORAL at 21:50

## 2023-07-08 RX ADMIN — GABAPENTIN 300 MILLIGRAM(S): 400 CAPSULE ORAL at 21:50

## 2023-07-08 RX ADMIN — APIXABAN 5 MILLIGRAM(S): 2.5 TABLET, FILM COATED ORAL at 06:19

## 2023-07-08 RX ADMIN — OXYCODONE HYDROCHLORIDE 10 MILLIGRAM(S): 5 TABLET ORAL at 13:20

## 2023-07-08 RX ADMIN — OXYCODONE HYDROCHLORIDE 10 MILLIGRAM(S): 5 TABLET ORAL at 12:50

## 2023-07-08 RX ADMIN — Medication 1 MILLIGRAM(S): at 11:52

## 2023-07-08 RX ADMIN — Medication 975 MILLIGRAM(S): at 21:50

## 2023-07-08 RX ADMIN — Medication 0.6 MILLIGRAM(S): at 06:19

## 2023-07-08 RX ADMIN — APIXABAN 5 MILLIGRAM(S): 2.5 TABLET, FILM COATED ORAL at 18:05

## 2023-07-08 RX ADMIN — PANTOPRAZOLE SODIUM 40 MILLIGRAM(S): 20 TABLET, DELAYED RELEASE ORAL at 06:19

## 2023-07-08 RX ADMIN — Medication 0.6 MILLIGRAM(S): at 18:05

## 2023-07-08 RX ADMIN — DULOXETINE HYDROCHLORIDE 30 MILLIGRAM(S): 30 CAPSULE, DELAYED RELEASE ORAL at 11:52

## 2023-07-08 RX ADMIN — Medication 975 MILLIGRAM(S): at 06:19

## 2023-07-08 RX ADMIN — OXYCODONE HYDROCHLORIDE 10 MILLIGRAM(S): 5 TABLET ORAL at 06:55

## 2023-07-08 RX ADMIN — POLYETHYLENE GLYCOL 3350 17 GRAM(S): 17 POWDER, FOR SOLUTION ORAL at 11:52

## 2023-07-08 RX ADMIN — OXYCODONE HYDROCHLORIDE 10 MILLIGRAM(S): 5 TABLET ORAL at 06:25

## 2023-07-08 RX ADMIN — MYCOPHENOLATE MOFETIL 1500 MILLIGRAM(S): 250 CAPSULE ORAL at 06:19

## 2023-07-08 RX ADMIN — MYCOPHENOLATE MOFETIL 1500 MILLIGRAM(S): 250 CAPSULE ORAL at 18:04

## 2023-07-08 NOTE — DISCHARGE NOTE NURSING/CASE MANAGEMENT/SOCIAL WORK - PATIENT PORTAL LINK FT
You can access the FollowMyHealth Patient Portal offered by [facility] by registering at the following website: [email or web address]. By joining "[email or web address]"’s FollowMyHealth portal, you will also be able to view your health information using other applications (apps) compatible with our system.
[de-identified] : Today I had a lengthy discussion with the patient regarding their left ankle pain.I have addressed all the patient's concerns surrounding the pathology of their condition. I reviewed the patient's MRI and educated them about the anatomy and physiology of their ailment. I recommend that the patient ween out of their CAM boot and into an ASO brace as tolerated. The patient was fitted for the ASO brace in the office today. I recommend the patient undergo a course of physical therapy for the left ankle  2-3 times a week for a total of 6-8 weeks. A prescription was given for the physical therapy today. I advised the patient to utilize ice, NSAIDs PRN, and heat. They can also elevate their left ankle above the level of their heart. I would like to see the patient back in the office in 2 weeks to reassess their condition. 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 The patient understood and verbally agreed to the treatment plan. All of their questions were answered and they were satisfied with the visit. The patient should call the office if they have any questions or experience worsening symptoms.

## 2023-07-08 NOTE — DISCHARGE NOTE NURSING/CASE MANAGEMENT/SOCIAL WORK - NSDCPEFALRISK_GEN_ALL_CORE
For information on Fall & Injury Prevention, visit: [email or web address] OR  [email or web address] OR  https://www.cdc.gov/steadi/patient.html

## 2023-07-08 NOTE — PROGRESS NOTE ADULT - SUBJECTIVE AND OBJECTIVE BOX
PATRICIO WELDON  53y, Female  Allergy: No Known Allergies    Hospital Day: 11 d    Patient seen and examined. No acute events overnight    PAST MEDICAL & SURGICAL HISTORY:  ? Lupus  ? Sickle cell disease ?   Stroke since [employer] school   Pulmonary embolism  S/P cholecystectomy    VITALS:    T(C): 36.8 (08 Jul 2023 14:55), Max: 37.4 (08 Jul 2023 13:26)  T(F): 98.2 (08 Jul 2023 14:55), Max: 99.4 (08 Jul 2023 13:26)  HR: 104 (08 Jul 2023 14:35) (85 - 105)  BP: 116/59 (08 Jul 2023 14:35) (110/58 - 130/61)  RR: 17 (08 Jul 2023 14:35) (16 - 18)  SpO2: 97% (08 Jul 2023 14:35) (95% - 98%)    Parameters below as of 08 Jul 2023 14:35  Patient On (Oxygen Delivery Method): room air    TESTS & MEASUREMENTS:  Weight (Kg):     07-05-23 @ 07:01  -  07-06-23 @ 07:00  --------------------------------------------------------  IN: 1416 mL / OUT: 300 mL / NET: 1116 mL    07-06-23 @ 07:01  -  07-07-23 @ 07:00  --------------------------------------------------------  IN: 606 mL / OUT: 1375 mL / NET: -769 mL                        8.3    9.82  )-----------( 309      ( 08 Jul 2023 08:05 )             23.3     07-08    139  |  102  |  6<L>  ----------------------------<  97  3.8   |  27  |  0.6<L>    Ca    8.4      08 Jul 2023 08:05    Mg     1.8     07-08    TPro  5.4<L>  /  Alb  3.1<L>  /  TBili  1.0  /  DBili  x   /  AST  16  /  ALT  14  /  AlkPhos  76  07-08                        8.3    9.38  )-----------( 295      ( 07 Jul 2023 07:01 )             22.6       07-07    139  |  103  |  7<L>  ----------------------------<  109<H>  3.7   |  28  |  0.6<L>    Ca    8.6      07 Jul 2023 07:01    Mg     1.7     07-07    Urinalysis Basic - ( 07 Jul 2023 07:01 )  Color: x / Appearance: x / SG: x / pH: x  Gluc: 109 mg/dL / Ketone: x  / Bili: x / Urobili: x   Blood: x / Protein: x / Nitrite: x   Leuk Esterase: x / RBC: x / WBC x   Sq Epi: x / Non Sq Epi: x / Bacteria: x    RADIOLOGY & ADDITIONAL TESTS:   Reviewed     RECENT DIAGNOSTIC ORDERS:  CT Angio Abdomen and Pelvis w/ IV Cont: Routine   Indication: nausea/vomiting  Transport: [facility] (07-06-23 @ 12:04)      MEDICATIONS  (STANDING):  acetaminophen     Tablet .. 975 milliGRAM(s) Oral every 8 hours  apixaban 5 milliGRAM(s) Oral every 12 hours  colchicine 0.6 milliGRAM(s) Oral every 12 hours  DULoxetine 30 milliGRAM(s) Oral daily  folic acid 1 milliGRAM(s) Oral daily  gabapentin 300 milliGRAM(s) Oral at bedtime  mycophenolate mofetil 1500 milliGRAM(s) Oral two times a day  pantoprazole    Tablet 40 milliGRAM(s) Oral before breakfast  polyethylene glycol 3350 17 Gram(s) Oral daily  senna 2 Tablet(s) Oral at bedtime      MEDICATIONS  (PRN):  artificial  tears Solution 1 Drop(s) Both EYES two times a day PRN Dry Eyes  cyclobenzaprine 5 milliGRAM(s) Oral three times a day PRN Muscle Spasm  oxyCODONE    IR 10 milliGRAM(s) Oral every 6 hours PRN Severe Pain (7 - 10)    HOME MEDICATIONS:  Eliquis 5 mg oral tablet (06-28)  mycophenolate mofetil 500 mg oral tablet (06-28)  predniSONE 10 mg oral tablet (06-28)      REVIEW OF SYSTEMS:  All other review of systems is negative unless indicated above.     PHYSICAL EXAM:  PHYSICAL EXAM:  GENERAL: NAD, well-developed  HEAD:  Atraumatic, Normocephalic  NECK: Supple, No JVD  CHEST/LUNG: Clear to auscultation bilaterally; No wheeze  HEART: Regular rate and rhythm; No murmurs, rubs, or gallops  ABDOMEN: Soft, Nontender, Nondistended; Bowel sounds present  EXTREMITIES:  2+ Peripheral Pulses, No clubbing, cyanosis, or edema  SKIN: No rashes or lesions

## 2023-07-08 NOTE — DISCHARGE NOTE NURSING/CASE MANAGEMENT/SOCIAL WORK - NSDCVIVACCINE_GEN_ALL_CORE_FT
influenza, injectable, quadrivalent, preservative free; 16-Sep-2021 16:14; Juan Hoffmann (RN); [employer]; [id number] (Exp. Date: 30-Jun-2022); IntraMuscular; Deltoid Right.; 0.5 milliLiter(s); VIS (VIS Published: 15-Aug-2019, VIS Presented: 16-Sep-2021);

## 2023-07-09 PROCEDURE — 99232 SBSQ HOSP IP/OBS MODERATE 35: CPT

## 2023-07-09 RX ADMIN — Medication 975 MILLIGRAM(S): at 14:14

## 2023-07-09 RX ADMIN — Medication 0.6 MILLIGRAM(S): at 05:07

## 2023-07-09 RX ADMIN — OXYCODONE HYDROCHLORIDE 10 MILLIGRAM(S): 5 TABLET ORAL at 08:47

## 2023-07-09 RX ADMIN — OXYCODONE HYDROCHLORIDE 10 MILLIGRAM(S): 5 TABLET ORAL at 18:54

## 2023-07-09 RX ADMIN — Medication 0.6 MILLIGRAM(S): at 17:25

## 2023-07-09 RX ADMIN — Medication 975 MILLIGRAM(S): at 21:24

## 2023-07-09 RX ADMIN — OXYCODONE HYDROCHLORIDE 10 MILLIGRAM(S): 5 TABLET ORAL at 19:55

## 2023-07-09 RX ADMIN — APIXABAN 5 MILLIGRAM(S): 2.5 TABLET, FILM COATED ORAL at 05:07

## 2023-07-09 RX ADMIN — Medication 975 MILLIGRAM(S): at 22:02

## 2023-07-09 RX ADMIN — GABAPENTIN 300 MILLIGRAM(S): 400 CAPSULE ORAL at 21:24

## 2023-07-09 RX ADMIN — MYCOPHENOLATE MOFETIL 1500 MILLIGRAM(S): 250 CAPSULE ORAL at 17:24

## 2023-07-09 RX ADMIN — DULOXETINE HYDROCHLORIDE 30 MILLIGRAM(S): 30 CAPSULE, DELAYED RELEASE ORAL at 12:01

## 2023-07-09 RX ADMIN — OXYCODONE HYDROCHLORIDE 10 MILLIGRAM(S): 5 TABLET ORAL at 14:14

## 2023-07-09 RX ADMIN — PANTOPRAZOLE SODIUM 40 MILLIGRAM(S): 20 TABLET, DELAYED RELEASE ORAL at 05:09

## 2023-07-09 RX ADMIN — APIXABAN 5 MILLIGRAM(S): 2.5 TABLET, FILM COATED ORAL at 17:25

## 2023-07-09 RX ADMIN — OXYCODONE HYDROCHLORIDE 10 MILLIGRAM(S): 5 TABLET ORAL at 14:44

## 2023-07-09 RX ADMIN — Medication 975 MILLIGRAM(S): at 05:07

## 2023-07-09 RX ADMIN — POLYETHYLENE GLYCOL 3350 17 GRAM(S): 17 POWDER, FOR SOLUTION ORAL at 12:01

## 2023-07-09 RX ADMIN — MYCOPHENOLATE MOFETIL 1500 MILLIGRAM(S): 250 CAPSULE ORAL at 05:08

## 2023-07-09 RX ADMIN — SENNA PLUS 2 TABLET(S): 8.6 TABLET ORAL at 21:24

## 2023-07-09 RX ADMIN — Medication 975 MILLIGRAM(S): at 13:14

## 2023-07-09 RX ADMIN — Medication 1 MILLIGRAM(S): at 12:01

## 2023-07-09 RX ADMIN — OXYCODONE HYDROCHLORIDE 10 MILLIGRAM(S): 5 TABLET ORAL at 09:17

## 2023-07-09 NOTE — PROGRESS NOTE ADULT - SUBJECTIVE AND OBJECTIVE BOX
PATRICIO WELDON  53y, Female  Allergy: No Known Allergies    Hospital Day: 12 d    Patient seen and examined. No acute events overnight    PAST MEDICAL & SURGICAL HISTORY:  ? SLE?? 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   Pulmonary embolism on AC   S/P cholecystectomy    VITALS:    T(C): 36.7 (09 Jul 2023 04:47), Max: 37.6 (08 Jul 2023 21:00)  T(F): 98.1 (09 Jul 2023 04:47), Max: 99.6 (08 Jul 2023 21:00)  HR: 102 (09 Jul 2023 07:15) (76 - 105)  BP: 106/59 (09 Jul 2023 07:15) (89/55 - 116/59)  BP(mean): 67 (09 Jul 2023 04:47) (67 - 67)  RR: 16 (09 Jul 2023 06:40) (16 - 17)  SpO2: 97% (09 Jul 2023 06:40) (95% - 98%)    Parameters below as of 09 Jul 2023 07:15  Patient On (Oxygen Delivery Method): room air  O2 Flow (L/min): 98      TESTS & MEASUREMENTS:  Weight (Kg):     07-05-23 @ 07:01  -  07-06-23 @ 07:00  --------------------------------------------------------  IN: 1416 mL / OUT: 300 mL / NET: 1116 mL    07-06-23 @ 07:01  -  07-07-23 @ 07:00  --------------------------------------------------------  IN: 606 mL / OUT: 1375 mL / NET: -769 mL            NO NEW LABS                         8.3    9.82  )-----------( 309      ( 08 Jul 2023 08:05 )             23.3     07-08    139  |  102  |  6<L>  ----------------------------<  97  3.8   |  27  |  0.6<L>    Ca    8.4      08 Jul 2023 08:05    Mg     1.8     07-08    TPro  5.4<L>  /  Alb  3.1<L>  /  TBili  1.0  /  DBili  x   /  AST  16  /  ALT  14  /  AlkPhos  76  07-08                        8.3    9.38  )-----------( 295      ( 07 Jul 2023 07:01 )             22.6       07-07    139  |  103  |  7<L>  ----------------------------<  109<H>  3.7   |  28  |  0.6<L>    Ca    8.6      07 Jul 2023 07:01    Mg     1.7     07-07    Urinalysis Basic - ( 07 Jul 2023 07:01 )  Color: x / Appearance: x / SG: x / pH: x  Gluc: 109 mg/dL / Ketone: x  / Bili: x / Urobili: x   Blood: x / Protein: x / Nitrite: x   Leuk Esterase: x / RBC: x / WBC x   Sq Epi: x / Non Sq Epi: x / Bacteria: x    RADIOLOGY & ADDITIONAL TESTS:   Reviewed     RECENT DIAGNOSTIC ORDERS:  CT Angio Abdomen and Pelvis w/ IV Cont: Routine   Indication: nausea/vomiting  Transport: [facility] (07-06-23 @ 12:04)      MEDICATIONS  (STANDING):  acetaminophen     Tablet .. 975 milliGRAM(s) Oral every 8 hours  apixaban 5 milliGRAM(s) Oral every 12 hours  colchicine 0.6 milliGRAM(s) Oral every 12 hours  DULoxetine 30 milliGRAM(s) Oral daily  folic acid 1 milliGRAM(s) Oral daily  gabapentin 300 milliGRAM(s) Oral at bedtime  mycophenolate mofetil 1500 milliGRAM(s) Oral two times a day  pantoprazole    Tablet 40 milliGRAM(s) Oral before breakfast  polyethylene glycol 3350 17 Gram(s) Oral daily  senna 2 Tablet(s) Oral at bedtime      MEDICATIONS  (PRN):  artificial  tears Solution 1 Drop(s) Both EYES two times a day PRN Dry Eyes  cyclobenzaprine 5 milliGRAM(s) Oral three times a day PRN Muscle Spasm  oxyCODONE    IR 10 milliGRAM(s) Oral every 6 hours PRN Severe Pain (7 - 10)    HOME MEDICATIONS:  Eliquis 5 mg oral tablet (06-28)  mycophenolate mofetil 500 mg oral tablet (06-28)  predniSONE 10 mg oral tablet (06-28)      REVIEW OF SYSTEMS:  All other review of systems is negative unless indicated above.     PHYSICAL EXAM:  PHYSICAL EXAM:  GENERAL: NAD, well-developed  HEAD:  Atraumatic, Normocephalic  NECK: Supple, No JVD  CHEST/LUNG: Clear to auscultation bilaterally; No wheeze  HEART: Regular rate and rhythm; No murmurs, rubs, or gallops  ABDOMEN: Soft, Nontender, Nondistended; Bowel sounds present  EXTREMITIES:  2+ Peripheral Pulses, No clubbing, cyanosis, or edema  SKIN: No rashes or lesions

## 2023-07-09 NOTE — CONSULT NOTE ADULT - SUBJECTIVE AND OBJECTIVE BOX
PAIN MANAGEMENT CONSULT NOTE    Chief Complaint:    HPI:  51 yr old female with PMHx APLS, hx ofr stroke in 1992 with residual left sided weakness (on eliquis 5 mg BID), undifferentiated CTD, HbSC, hx of PE (on eliquis) presents presenting from rehab facility with chest pain, SOB, cough, headache since Saturday. History goes back to 03/23 where the patient felt weak and had abdominal pain and was admitted to [facility] turned out to have low Hb of 6.3. Patient was then discharged to [facility] with no clear diagnosis. 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 She saw again Dr. Gutierrez, who increased the Mycophenolate to 1500 mg BID. On Saturfay patient woke up with shortness of breath and chest tightness, and back pain. Pain is not exacerbated by exertion. Chest is tender to palpation and pain is more pronounced on deep inspiration. Patient reported severe SOB since saturday. Patient was supposed to be finishing rehab tomorrow and the Rehab center sent her to the ED. Patient denies any urinary symptoms, no palpitations, denies any GI symptoms except an episode of loose stools today morning. Patient was started on prednsione taper on Satur 30 mg PO OD and is now on 10 mg PO OD. Patient is on 2L NC. Patient examination is normal with no crackles, wheezing, murmur, rub, no sign of DVT.     Labs noticeable for WBC of 11.55, Hb 8.9, D-dimer 377, CRP 39, A mildly positive WBC 8   CT chest Angio showed Bibasilar dependent subsegmental areas of atelectasis and/or scarring.  Limited Echo in the ED showed: Small Pericardial Effusion, Decreased EF, RV dilation  EKG: NSR           PAST MEDICAL & SURGICAL HISTORY:  Lupus      Sickle cell disease      Stroke      Pulmonary embolism      S/P cholecystectomy          FAMILY HISTORY:      SOCIAL HISTORY:  [ ] Denies Smoking, Alcohol, or Drug Use    HOME MEDICATIONS:   Please refer to initial HNP    PAIN HOME MEDICATIONS:    Allergies    No Known Allergies    Intolerances        PAIN MEDICATIONS:  acetaminophen     Tablet .. 975 milliGRAM(s) Oral every 8 hours  cyclobenzaprine 5 milliGRAM(s) Oral three times a day PRN  DULoxetine 30 milliGRAM(s) Oral daily  gabapentin 300 milliGRAM(s) Oral at bedtime  oxyCODONE    IR 10 milliGRAM(s) Oral every 4 hours PRN    Heme:  apixaban 5 milliGRAM(s) Oral every 12 hours    Antibiotics:    Cardiovascular:    GI:  pantoprazole    Tablet 40 milliGRAM(s) Oral before breakfast  polyethylene glycol 3350 17 Gram(s) Oral daily  senna 2 Tablet(s) Oral at bedtime    Endocrine:  colchicine 0.6 milliGRAM(s) Oral every 12 hours    All Other Medications:  artificial  tears Solution 1 Drop(s) Both EYES two times a day PRN  folic acid 1 milliGRAM(s) Oral daily  mycophenolate mofetil 1500 milliGRAM(s) Oral two times a day      Vital Signs Last 24 Hrs  T(C): 36.7 (09 Jul 2023 04:47), Max: 37.6 (08 Jul 2023 21:00)  T(F): 98.1 (09 Jul 2023 04:47), Max: 99.6 (08 Jul 2023 21:00)  HR: 102 (09 Jul 2023 07:15) (76 - 102)  BP: 106/59 (09 Jul 2023 07:15) (89/55 - 116/57)  BP(mean): 67 (09 Jul 2023 04:47) (67 - 67)  RR: 16 (09 Jul 2023 06:40) (16 - 17)  SpO2: 97% (09 Jul 2023 06:40) (95% - 97%)    Parameters below as of 09 Jul 2023 07:15  Patient On (Oxygen Delivery Method): room air  O2 Flow (L/min): 98      LABS:                        8.3    9.82  )-----------( 309      ( 08 Jul 2023 08:05 )             23.3     07-08    139  |  102  |  6<L>  ----------------------------<  97  3.8   |  27  |  0.6<L>    Ca    8.4      08 Jul 2023 08:05  Mg     1.8     07-08    TPro  5.4<L>  /  Alb  3.1<L>  /  TBili  1.0  /  DBili  x   /  AST  16  /  ALT  14  /  AlkPhos  76  07-08      Urinalysis Basic - ( 08 Jul 2023 08:05 )    Color: x / Appearance: x / SG: x / pH: x  Gluc: 97 mg/dL / Ketone: x  / Bili: x / Urobili: x   Blood: x / Protein: x / Nitrite: x   Leuk Esterase: x / RBC: x / WBC x   Sq Epi: x / Non Sq Epi: x / Bacteria: x        RADIOLOGYINTERPRETATION:  A focused transthoracic cardiac ultrasound examination   was performed.  A pericardial effusion was visualized.  EF appears decreased  IVC non-collapsible  RV dilated, no Nguyen's sign    IMPRESSION:  Small Pericardial Effusion  Decreased EF  RV dilation    CT Angio chest  IMPRESSION:  1.  No evidence of acute pulmonary embolus or acute thoracic pathology.    MR lumbar spine w/w/o IV cont  unremarkable    CT L spine unremarkable    CT A&P unremarkable    PHYSICAL EXAM  GENERAL: Laying in bed, NAD  Cranial nerves grossly intact  Motor exam:  L sided weakness generalized 2/2 to stroke   Sensation intact to LT in UE/LE in 3 dermatomes  CHEST/LUNG: Clear to auscultation bilaterally; No rales, rhonchi, wheezing, or rubs  ABDOMEN: Soft, Nontender, Nondistended; Bowel sounds present  EXTREMITIES:  2+ Peripheral Pulses, No clubbing, cyanosis, or edema  SKIN: No rashes or lesions      ASSESSMENT:   HPI:  51 yr old female with PMHx APLS, hx ofr stroke in 1992 with residual left sided weakness (on eliquis 5 mg BID), undifferentiated CTD, HbSC, hx of PE (on eliquis) presents presenting from rehab facility with chest pain, SOB, cough, headache since Saturday. History goes back to 03/23 where the patient felt weak and had abdominal pain and was admitted to [facility] turned out to have low Hb of 6.3. Patient was then discharged to [facility] with no clear diagnosis. 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 She saw again Dr. Gutierrez, who increased the Mycophenolate to 1500 mg BID. On Saturfay patient woke up with shortness of breath and chest tightness, and back pain. Pain is not exacerbated by exertion. Chest is tender to palpation and pain is more pronounced on deep inspiration. Patient reported severe SOB since saturday. Patient was supposed to be finishing rehab tomorrow and the Rehab center sent her to the ED. Patient denies any urinary symptoms, no palpitations, denies any GI symptoms except an episode of loose stools today morning. Patient was started on prednsione taper on Satur 30 mg PO OD and is now on 10 mg PO OD. Patient is on 2L NC. Patient examination is normal with no crackles, wheezing, murmur, rub, no sign of DVT.     Labs noticeable for WBC of 11.55, Hb 8.9, D-dimer 377, CRP 39, A mildly positive WBC 8   CT chest Angio showed Bibasilar dependent subsegmental areas of atelectasis and/or scarring.  Limited Echo in the ED showed: Small Pericardial Effusion, Decreased EF, RV dilation  EKG: NSR     VS insignificant,   Vital Signs Last 24 Hrs  T(C): 35.8 (27 Jun 2023 16:10), Max: 37.1 (27 Jun 2023 13:54)  T(F): 96.4 (27 Jun 2023 16:10), Max: 98.7 (27 Jun 2023 13:54)  HR: 92 (27 Jun 2023 16:10) (92 - 97)  BP: 102/55 (27 Jun 2023 16:10) (102/55 - 123/63)  RR: 16 (27 Jun 2023 16:10) (16 - 16)  SpO2: 97% (27 Jun 2023 16:10) (97% - 98%)      Pleuritic Chest Pain likely due to Costochondritis vs pericarditis vs Pleurisy in setting of COVID Pneumonia  #Diffuse body pain, possible sickle cell pain crisis vs mylagias from viral infection    Chest pain: costochondritis vs pericarditis vs pleurisy. The patient had recent covid pneumonia.   Generalized pain / musculoskeletal pain chest and back  Generalized fatigue    PLAN:   Tylenol 650mg q6h standing  consider holding cyclobenzaprine and gabapentin to see if that may be contributing to overall fatigue/weakness  continue duloxetine 30mg qD  oxycontin (oxycodone ER) 20mg q12h PRN (prompt the patient if/when due).   oxycodone 5mg q8h PRN for breakthrough. Hold for s/s of sedation   Lidocaine patch 5% q12h   Heat to painful areas on chest (20 minutes on / 20 minutes off). Avoid direct skin contact  No IV narcotics warranted or recommended from my perspective  f/u primary team / rheumatology  Encourage PO intake, hydration  Formal physical therapy and encourage specific daily / weekly functional goals  Bowel regimen: miralax / colace  Nausea ppx: zofran standing  Functional goals: oob-chair, ambulate PRN as per primary team  Physical therapy

## 2023-07-09 NOTE — PROGRESS NOTE ADULT - ASSESSMENT
53 yr old female with PMHx APLS, hx ofr stroke in 1992 with residual left sided weakness (on eliquis 5 mg BID), undifferentiated CTD, HbSC, hx of PE (on eliquis) presents presenting from rehab facility with chest pain, SOB, cough, headache. Found to have COVID+. Admitted for further management    #Pleuritic Chest Pain likely due to Costochondritis vs pericarditis vs Pleurisy in setting of COVID Pneumonia  #Diffuse body pain, possible sickle cell pain crisis vs mylagias from viral infection  #hx of HbSC Disease  PE ruled out  YAJAIRA neg, ds-DNA neg  Cardio consult appreciated. Pt treated for suspected pericarditis  Echocardio (07/02): Normal EF  PT continues to have diffuse pain. So far, workup was unrevealing of acute pathology. Her diffuse myalgias may be related to COVID infection  - Cont following pain management recommendations. Added duloxetine and flexeril for further pain control. Oxycodone PRN  - Cont IVF   - PT Eval   - Cont colchicine 0.6 BID, will f/u with cardio if needed to be continued  - Can DC telemetry    #Nausea/Vomiting  Possibly related to celecoxib. Improved after stopping med  r/o mesenteric ischemia  - f/u CT Angio abdomen/pelvis  - Symptomatic Tx     #COVID+   CT chest Angio showed Bibasilar dependent subsegmental areas of atelectasis and/or scarring.  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 Overall suspicion for autoimmune disease causing her symptoms are low  Pt was previously told she had undifferentiated CTD. 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 Unclear how dx was made as her workup here has been negative She has been on mycophenolate outpatient  - Cont MFF 1500mg po bid - mycophenolate levels in lab  - CW gabapentin  - Resend ESR/CRP  - pain control , increase oxycodone to 10mg prn for severe pain    #Normocytic anemia slightly worse than baseline HbSC/ Hx of stroke /hx of PE   * no signs of bleeding   - cw  eliquis     #stroke in 1992 with residual left sided weakness (on eliquis 5 mg BID),  #hx of PE (on eliquis)   - resume home meds    #DVT/GI px     #Progress Note Handoff  Pending (specify): clinical improvement / pain management recs / placement to STR / PT Eval   Family discussion: Pt has no one else I can inform about her status. Pt is AOx3 and makes own decisions. States no children/spouse/family to assist her at home. Pt was in STR for over 3months used up her SNF days per Insurance   Disposition: From STR / Will need STR (NO BENEFITS FOR STR PER CM)

## 2023-07-10 ENCOUNTER — TRANSCRIPTION ENCOUNTER (OUTPATIENT)
Age: 53
End: 2023-07-10

## 2023-07-10 LAB
MYCOPHENOLATE SERPL-MCNC: 2.5 UG/ML — SIGNIFICANT CHANGE UP (ref 1–3.5)
MYCOPHENOLIC ACID GLUCURONIDE: 70 UG/ML — SIGNIFICANT CHANGE UP (ref 15–125)

## 2023-07-10 PROCEDURE — 99232 SBSQ HOSP IP/OBS MODERATE 35: CPT

## 2023-07-10 RX ORDER — ACETAMINOPHEN 500 MG
650 TABLET ORAL EVERY 6 HOURS
Refills: 0 | Status: DISCONTINUED | OUTPATIENT
Start: 2023-07-10 | End: 2023-07-10

## 2023-07-10 RX ORDER — ONDANSETRON 8 MG/1
4 TABLET, FILM COATED ORAL ONCE
Refills: 0 | Status: COMPLETED | OUTPATIENT
Start: 2023-07-10 | End: 2023-07-10

## 2023-07-10 RX ORDER — OXYCODONE HYDROCHLORIDE 5 MG/1
20 TABLET ORAL EVERY 12 HOURS
Refills: 0 | Status: DISCONTINUED | OUTPATIENT
Start: 2023-07-10 | End: 2023-07-11

## 2023-07-10 RX ORDER — ONDANSETRON 8 MG/1
4 TABLET, FILM COATED ORAL ONCE
Refills: 0 | Status: DISCONTINUED | OUTPATIENT
Start: 2023-07-10 | End: 2023-07-10

## 2023-07-10 RX ORDER — ACETAMINOPHEN 500 MG
650 TABLET ORAL EVERY 6 HOURS
Refills: 0 | Status: DISCONTINUED | OUTPATIENT
Start: 2023-07-10 | End: 2023-07-11

## 2023-07-10 RX ADMIN — OXYCODONE HYDROCHLORIDE 20 MILLIGRAM(S): 5 TABLET ORAL at 12:53

## 2023-07-10 RX ADMIN — ONDANSETRON 4 MILLIGRAM(S): 8 TABLET, FILM COATED ORAL at 18:32

## 2023-07-10 RX ADMIN — OXYCODONE HYDROCHLORIDE 10 MILLIGRAM(S): 5 TABLET ORAL at 09:23

## 2023-07-10 RX ADMIN — Medication 650 MILLIGRAM(S): at 19:59

## 2023-07-10 RX ADMIN — Medication 650 MILLIGRAM(S): at 18:59

## 2023-07-10 RX ADMIN — Medication 650 MILLIGRAM(S): at 12:45

## 2023-07-10 RX ADMIN — OXYCODONE HYDROCHLORIDE 20 MILLIGRAM(S): 5 TABLET ORAL at 13:53

## 2023-07-10 RX ADMIN — PANTOPRAZOLE SODIUM 40 MILLIGRAM(S): 20 TABLET, DELAYED RELEASE ORAL at 05:20

## 2023-07-10 RX ADMIN — POLYETHYLENE GLYCOL 3350 17 GRAM(S): 17 POWDER, FOR SOLUTION ORAL at 11:45

## 2023-07-10 RX ADMIN — Medication 975 MILLIGRAM(S): at 06:02

## 2023-07-10 RX ADMIN — Medication 1 MILLIGRAM(S): at 11:45

## 2023-07-10 RX ADMIN — DULOXETINE HYDROCHLORIDE 30 MILLIGRAM(S): 30 CAPSULE, DELAYED RELEASE ORAL at 11:45

## 2023-07-10 RX ADMIN — Medication 0.6 MILLIGRAM(S): at 05:20

## 2023-07-10 RX ADMIN — MYCOPHENOLATE MOFETIL 1500 MILLIGRAM(S): 250 CAPSULE ORAL at 18:59

## 2023-07-10 RX ADMIN — Medication 975 MILLIGRAM(S): at 05:20

## 2023-07-10 RX ADMIN — OXYCODONE HYDROCHLORIDE 10 MILLIGRAM(S): 5 TABLET ORAL at 08:23

## 2023-07-10 RX ADMIN — APIXABAN 5 MILLIGRAM(S): 2.5 TABLET, FILM COATED ORAL at 05:20

## 2023-07-10 RX ADMIN — Medication 650 MILLIGRAM(S): at 11:45

## 2023-07-10 RX ADMIN — APIXABAN 5 MILLIGRAM(S): 2.5 TABLET, FILM COATED ORAL at 19:00

## 2023-07-10 RX ADMIN — MYCOPHENOLATE MOFETIL 1500 MILLIGRAM(S): 250 CAPSULE ORAL at 05:20

## 2023-07-10 NOTE — PROGRESS NOTE ADULT - ASSESSMENT
53 yr old female with PMHx APLS, hx ofr stroke in 1992 with residual left sided weakness (on eliquis 5 mg BID), undifferentiated CTD, HbSC, hx of PE (on eliquis) presents presenting from rehab facility with chest pain, SOB, cough, headache. Found to have COVID+. Admitted for further management    #Pleuritic Chest Pain likely due to Costochondritis vs pericarditis vs Pleurisy in setting of COVID Pneumonia  #Diffuse body pain, possible sickle cell pain crisis vs mylagias from viral infection  #hx of HbSC Disease  PE ruled out  YAJAIRA neg, ds-DNA neg  Echocardio (07/02): Normal EF  PT continues to have diffuse pain. So far, workup was unrevealing of acute pathology. Her diffuse myalgias may be related to COVID infection  - Cont following pain management recommendations. Added duloxetine and flexeril for further pain control.   - Starting Tylenol 650mg q6h standing / oxycotin 20mg / oxycodone 5mg  - Holding cyclobenzaparine and gabapentin  - oxycontin (oxycodone ER) 20mg q12h PRN (prompt the patient if/when due).   - oxycodone 5mg q8h PRN for breakthrough  - Cont IVF   - PT Eval - Formal physical therapy and encourage specific daily / weekly functional goals  Bowel regimen: miralax   - DC colchicine 0.6 mg BID  - Can DC telemetry    #Nausea/Vomiting/Constipation  Possibly related to celecoxib. Improved after stopping med  r/o mesenteric ischemia  - f/u CT Angio abdomen/pelvis -> no ischemic disease, sclerotic lesions of L3-L5 due to stress, constipation in stool  - Symptomatic Tx w/ zofran standing  - Started bowel regimen -> miralax    #COVID+   CT chest Angio showed Bibasilar dependent subsegmental areas of atelectasis and/or scarring.  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 Overall suspicion for autoimmune disease causing her symptoms are low  Pt was previously told she had undifferentiated CTD. 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 Unclear how dx was made as her workup here has been negative She has been on mycophenolate outpatient  - Cont MFF 1500mg po bid - mycophenolate levels in lab  - Hold gabapentin  - Resend ESR/CRP  - pain control , increase oxycodone to 10mg prn for severe pain    #Normocytic anemia slightly worse than baseline HbSC/ Hx of stroke /hx of PE   * no signs of bleeding   - cw  eliquis     #stroke in 1992 with residual left sided weakness (on eliquis 5 mg BID),  #hx of PE (on eliquis)   - resume home meds    #DVT/GI px    #Dispo  - Came from SNF and used all hours  - Physiatry involved  - STR  53 yr old female with PMHx APLS, hx ofr stroke in 1992 with residual left sided weakness (on eliquis 5 mg BID), undifferentiated CTD, HbSC, hx of PE (on eliquis) presents presenting from rehab facility with chest pain, SOB, cough, headache. Found to have COVID+. Admitted for further management    #Atypical Chest Pain constantly present anterior chest and posterior   -> Recent Covid Infection (CXR/TTE NL)   -> Pt did not improve with colchicine rx   -> Pain Management recs appreciated and ordered     #Diffuse body pain due to Myalgia vs Pleurisy from Covid Viral Infection vs Exarcerbation of Autoimmune Condition   #hx of HbSC Disease  PE ruled out  YAJAIRA neg, ds-DNA neg  Echocardio (07/02): Normal EF  PT continues to have diffuse pain. So far, workup was unrevealing of acute pathology. Her diffuse myalgias may be related to COVID infection  - Cont following pain management recommendations. Added duloxetine and flexeril for further pain control.   - Starting Tylenol 650mg q6h standing / oxycotin 20mg / oxycodone 5mg  - Holding cyclobenzaparine and gabapentin  - oxycontin (oxycodone ER) 20mg q12h PRN (prompt the patient if/when due).   - oxycodone 5mg q8h PRN for breakthrough  - Cont IVF   - PT Eval - Formal physical therapy and encourage specific daily / weekly functional goals  Bowel regimen: miralax   - DC colchicine 0.6 mg BID  - Can DC telemetry    #Nausea/Vomiting/Constipation  Possibly related to celecoxib. Improved after stopping med  r/o mesenteric ischemia  - f/u CT Angio abdomen/pelvis -> no ischemic disease, sclerotic lesions of L3-L5 due to stress, constipation in stool  - Symptomatic Tx w/ zofran standing  - Started bowel regimen -> miralax    #COVID+   CT chest Angio showed Bibasilar dependent subsegmental areas of atelectasis and/or scarring.  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 Overall suspicion for autoimmune disease causing her symptoms are low  Pt was previously told she had undifferentiated CTD. 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 Unclear how dx was made as her workup here has been negative She has been on mycophenolate outpatient  - Cont MFF 1500mg po bid - mycophenolate levels in lab  - Hold gabapentin  - Resend ESR/CRP  - pain control , increase oxycodone to 10mg prn for severe pain    #Normocytic anemia slightly worse than baseline HbSC/ Hx of stroke /hx of PE   * no signs of bleeding   - cw  eliquis     #stroke in 1992 with residual left sided weakness (on eliquis 5 mg BID),  #hx of PE (on eliquis)   - resume home meds    #DVT/GI px    #Dispo  - Came from SNF and used all hours  - Physiatry involved  - STR

## 2023-07-10 NOTE — PROGRESS NOTE ADULT - TIME BILLING
direct pt care and interdisciplinary rounds
direct pt care and interdisciplinary rounds
direct pt care and interdisciplinary rounds   chart reviewed  pain management orders verified   d/w pt at bedside

## 2023-07-10 NOTE — DISCHARGE NOTE PROVIDER - PROVIDER TOKENS
PROVIDER:[TOKEN:[[id number]],FOLLOWUP:[2 weeks]],PROVIDER:[TOKEN:[[id number]],FOLLOWUP:[1 week]],PROVIDER:[TOKEN:[[id number]],FOLLOWUP:[2 weeks]]

## 2023-07-10 NOTE — CONSULT NOTE ADULT - ASSESSMENT
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    PRECAUTIONS: [   ] Cardiac  [   ] Respiratory  [   ] Seizures [   ] Contact Isolation  [   ] Droplet Isolation  [   ] Other    Weight Bearing Status:     RECOMMENDATION:    Out of Bed to Chair     DVT/Decubiti Prophylaxis    REHAB PLAN:     [ x   ] Bedside P/T 3-5 times a week   [    ]   Bedside O/T  2-3 times a week             [    ] Speech Therapy               [    ]  No Rehab Therapy Indicated   Conditioning/ROM                                    ADL  Bed Mobility                                               Conditioning/ROM  Transfers                                                     Bed Mobility  Sitting /Standing Balance                         Transfers                                        Gait Training                                               Sitting/Standing Balance  Stair Training [   ]Applicable                    Home equipment Eval                                                                        Splinting  [   ] Only      GOALS:   ADL   [    ]   Independent                    Transfers  [  x  ] Independent                          Ambulation  [  x  ] Independent     [   x  ] With device                            [    ]  CG                                                         [    ]  CG                                                                  [    ] CG                            [    ] Min A                                                   [    ] Min A                                                              [    ] Min  A          DISCHARGE PLAN:   [    ]  Good candidate for Intensive Rehabilitation/Hospital based                                             Will tolerate 3hrs Intensive Rehab Daily                                       [ x    ]  Short Term Rehab in Skilled Nursing Facility                                       [     ]  Home with Outpatient or  services                                         [     ]  Possible Candidate for Intensive Hospital based Rehab

## 2023-07-10 NOTE — DISCHARGE NOTE PROVIDER - NSDCCPCAREPLAN_GEN_ALL_CORE_FT
PRINCIPAL DISCHARGE DIAGNOSIS  Diagnosis: 2019 novel coronavirus disease (COVID-19)  Assessment and Plan of Treatment: You were admitted for chest pain and found to be covid positive, we treated you with steroids and antiviral medication. You continued to have symptoms of myalgia, nausea, vomiting and diffuse pain. We did a CT scan which was negative for any blood clots and did not show any other abnormalities. We believe this pain may is chronic but was exacerbated by your acute condition. We will continue your medication on discharge and recommend you follow up with the pain specialist and rheumatologist for continued management of your chronic conditions.      SECONDARY DISCHARGE DIAGNOSES  Diagnosis: Chest pain  Assessment and Plan of Treatment:      PRINCIPAL DISCHARGE DIAGNOSIS  Diagnosis: 2019 novel coronavirus disease (COVID-19)  Assessment and Plan of Treatment: You were admitted for chest pain and found to be covid positive, we treated you with steroids and antiviral medication. You continued to have symptoms of myalgia, nausea, vomiting and diffuse pain. We did a CT scan which was negative for any blood clots and did not show any other abnormalities. We believe this pain may is chronic but was exacerbated by your acute condition. We will continue your medication on discharge and recommend you follow up with the pain specialist and rheumatologist for continued management of your chronic conditions.      SECONDARY DISCHARGE DIAGNOSES  Diagnosis: Chest pain  Assessment and Plan of Treatment: SUSPECTED ACUTE PERICARDITIS CONTINUE CARE

## 2023-07-10 NOTE — CONSULT NOTE ADULT - CONSULT REASON
Chest pain
covid
patient requesting pain management consult
pls assess this pt with chronic on acute pain with multiple questionable diagnosis involving connect tissue disorder continues to have pain and had recent covid severely weak and dyspnea but not hypoxic. Pls assess for pain control recs.
deconditioning
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

## 2023-07-10 NOTE — DISCHARGE NOTE PROVIDER - ATTENDING DISCHARGE PHYSICAL EXAMINATION:
Vital Signs Last 24 Hrs  T(C): 37.2 (11 Jul 2023 13:30), Max: 37.2 (11 Jul 2023 13:30)  T(F): 99 (11 Jul 2023 13:30), Max: 99 (11 Jul 2023 13:30)  HR: 102 (11 Jul 2023 13:30) (80 - 102)  BP: 117/64 (11 Jul 2023 13:30) (104/59 - 117/64)  BP(mean): --  RR: 18 (11 Jul 2023 13:30) (16 - 18)  SpO2: 97% (11 Jul 2023 13:30) (97% - 98%)    Parameters below as of 10 Jul 2023 21:00  Patient On (Oxygen Delivery Method): room air    MS: AAOx3  GEN: NAD  CV: NL S1/S2  RESP: CTA B/L   GI: +BS SOFT NT ND  EXT: NO E/C/C   WEAKNESS 2/2 OLD STROKE AND POST COVID DECONDITIONING     no labs as has been stable  12-Apr-2021 12:50

## 2023-07-10 NOTE — PROGRESS NOTE ADULT - ATTENDING COMMENTS
Pt seen and examined. Case and Plan discussed at rounds and agree with above note as corrected  Atypical Chest Pain full w/up negative suspected to be due to Pleurisy from Viral Covid Infection vs Exarcerbation of Autoimmune Condition  - Pt on immunosuppressants  - Pt s/p Steroids x 10d for covid  - CP w/out improvement    Dispo: d/c planning / f/u Physiatry Eval if declined will need to go Home w/ VNS/PT at home / finished SNF days no further coverage / anticipate for d/c None

## 2023-07-10 NOTE — DISCHARGE NOTE PROVIDER - HOSPITAL COURSE
51 yr old female with PMHx APLS, hx ofr stroke in 1992 with residual left sided weakness (on eliquis 5 mg BID), undifferentiated CTD, HbSC, hx of PE (on eliquis) presents presenting from rehab facility with chest pain, SOB, cough, headache since Saturday. History goes back to 03/23 where the patient felt weak and had abdominal pain and was admitted to [facility] turned out to have low Hb of 6.3. Patient was then discharged to [facility] with no clear diagnosis. 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 She saw again Dr. Gutierrez, who increased the Mycophenolate to 1500 mg BID. On Saturfay patient woke up with shortness of breath and chest tightness, and back pain. Pain is not exacerbated by exertion. Chest is tender to palpation and pain is more pronounced on deep inspiration. Patient reported severe SOB since saturday. Patient was supposed to be finishing rehab tomorrow and the Rehab center sent her to the ED. Patient denies any urinary symptoms, no palpitations, denies any GI symptoms except an episode of loose stools today morning. Patient was started on prednsione taper on Satur 30 mg PO OD and is now on 10 mg PO OD. Patient is on 2L NC. Patient examination is normal with no crackles, wheezing, murmur, rub, no sign of DVT.     Labs noticeable for WBC of 11.55, Hb 8.9, D-dimer 377, CRP 39, A mildly positive WBC 8   CT chest Angio showed Bibasilar dependent subsegmental areas of atelectasis and/or scarring.  Limited Echo in the ED showed: Small Pericardial Effusion, Decreased EF, RV dilation  EKG: NSR     Patient found to be COVID +ve, admitted to medicine for further management.   ID consulted, patient completed course of remdesevir and decadron.   During hospital stay patient had multiple episodes of pleuritic chest pain, nausea, vomiting and diffuse pain.   Thorough medical w/u completed. CTA -ve for acute PE and mesenteric ischemia, YAJAIRA dsDNA DNA. Cardiology consulted for possible pericarditis s/p treatment w/ colchicine, TTE done showing normal EF.  Pain management consulted, medication adjusted.  PT evaluated patient, recommend STR, patient will be d/c to LTR with op f/u for management of chronic pain and suspected CTD.  Medicallys table for d/c.   53 yr old female with PMHx APLS, hx ofr stroke in 1992 with residual left sided weakness (on eliquis 5 mg BID), undifferentiated CTD, HbSC, hx of PE (on eliquis) presents presenting from rehab facility with chest pain, SOB, cough, headache since Saturday. History goes back to 03/23 where the patient felt weak and had abdominal pain and was admitted to [facility] turned out to have low Hb of 6.3. Patient was then discharged to [place] [facility] with no clear diagnosis. 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 She saw again Dr. Gutierrez, who increased the Mycophenolate to 1500 mg BID. On Saturfay patient woke up with shortness of breath and chest tightness, and back pain. Pain is not exacerbated by exertion. Chest is tender to palpation and pain is more pronounced on deep inspiration. Patient reported severe SOB since saturday. Patient was supposed to be finishing rehab tomorrow and the Rehab center sent her to the ED. Patient denies any urinary symptoms, no palpitations, denies any GI symptoms except an episode of loose stools today morning. Patient was started on prednsione taper on Satur 30 mg PO OD and is now on 10 mg PO OD. Patient is on 2L NC. Patient examination is normal with no crackles, wheezing, murmur, rub, no sign of DVT.     Labs noticeable for WBC of 11.55, Hb 8.9, D-dimer 377, CRP 39, A mildly positive WBC 8   CT chest Angio showed Bibasilar dependent subsegmental areas of atelectasis and/or scarring.  Limited Echo in the ED showed: Small Pericardial Effusion, Decreased EF, RV dilation  EKG: NSR     Patient found to be COVID +ve, admitted to medicine for further management.   ID consulted, patient completed course of remdesevir and decadron.   During hospital stay patient had multiple episodes of pleuritic chest pain, nausea, vomiting and diffuse pain.   Thorough medical w/u completed. CTA -ve for acute PE and mesenteric ischemia, YAJAIRA dsDNA DNA. Cardiology consulted for possible pericarditis s/p treatment w/ colchicine, TTE done showing normal EF.  Pain management consulted, medication adjusted.  PT evaluated patient, recommend STR, patient will be d/c to LTR with op f/u for management of chronic pain and suspected CTD.  Medically table for d/c.

## 2023-07-10 NOTE — DISCHARGE NOTE PROVIDER - NSDCMRMEDTOKEN_GEN_ALL_CORE_FT
Eliquis 5 mg oral tablet: 1 tab(s) orally 2 times a day  folic acid 1 mg oral tablet: 1 tab(s) orally once a day  mycophenolate mofetil 500 mg oral tablet: 3 tab(s) orally 2 times a day  predniSONE 10 mg oral tablet: 1 tab(s) orally once a day   acetaminophen 325 mg oral tablet: 2 tab(s) orally every 6 hours  DULoxetine 30 mg oral delayed release capsule: 1 cap(s) orally once a day  Eliquis 5 mg oral tablet: 1 tab(s) orally 2 times a day  folic acid 1 mg oral tablet: 1 tab(s) orally once a day  mycophenolate mofetil 500 mg oral tablet: 3 tab(s) orally 2 times a day  oxyCODONE 20 mg oral tablet, extended release: 1 tab(s) orally every 12 hours As needed COIVD myalgia/ CTD  oxycodone-acetaminophen 5 mg-325 mg oral tablet: 1 tab(s) orally every 8 hours As needed Severe Pain (7 - 10)  pantoprazole 40 mg oral delayed release tablet: 1 tab(s) orally once a day (before a meal)  polyethylene glycol 3350 oral powder for reconstitution: 17 gram(s) orally once a day  predniSONE 10 mg oral tablet: 1 tab(s) orally once a day  senna leaf extract oral tablet: 2 tab(s) orally once a day (at bedtime)

## 2023-07-10 NOTE — CONSULT NOTE ADULT - SUBJECTIVE AND OBJECTIVE BOX
HPI:  51 yr old female with PMHx APLS, hx ofr stroke in 1992 with residual left sided weakness (on eliquis 5 mg BID), undifferentiated CTD, HbSC, hx of PE (on eliquis) presents presenting from rehab facility with chest pain, SOB, cough, headache since Saturday. History goes back to 03/23 where the patient felt weak and had abdominal pain and was admitted to [facility] turned out to have low Hb of 6.3. Patient was then discharged to [facility] with no clear diagnosis. 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 She saw again Dr. Gutierrez, who increased the Mycophenolate to 1500 mg BID. On Saturfay patient woke up with shortness of breath and chest tightness, and back pain. Pain is not exacerbated by exertion. Chest is tender to palpation and pain is more pronounced on deep inspiration. Patient reported severe SOB since saturday. Patient was supposed to be finishing rehab tomorrow and the Rehab center sent her to the ED. Patient denies any urinary symptoms, no palpitations, denies any GI symptoms except an episode of loose stools today morning. Patient was started on prednsione taper on Satur 30 mg PO OD and is now on 10 mg PO OD. Patient is on 2L NC. Patient examination is normal with no crackles, wheezing, murmur, rub, no sign of DVT.     Labs noticeable for WBC of 11.55, Hb 8.9, D-dimer 377, CRP 39, A mildly positive WBC 8   CT chest Angio showed Bibasilar dependent subsegmental areas of atelectasis and/or scarring.  Limited Echo in the ED showed: Small Pericardial Effusion, Decreased EF, RV dilation  EKG: NSR     VS insignificant,   Vital Signs Last 24 Hrs  T(C): 35.8 (27 Jun 2023 16:10), Max: 37.1 (27 Jun 2023 13:54)  T(F): 96.4 (27 Jun 2023 16:10), Max: 98.7 (27 Jun 2023 13:54)  HR: 92 (27 Jun 2023 16:10) (92 - 97)  BP: 102/55 (27 Jun 2023 16:10) (102/55 - 123/63)  RR: 16 (27 Jun 2023 16:10) (16 - 16)  SpO2: 97% (27 Jun 2023 16:10) (97% - 98%)    O2 Parameters below as of 27 Jun 2023 16:10  Patient On (Oxygen Delivery Method): nasal cannula  O2 Flow (L/min): 2    #Atypical Chest Pain constantly present anterior chest and posterior   -> Recent Covid Infection (CXR/TTE NL)   -> Pt did not improve with colchicine rx   -> Pain Management recs appreciated and ordered     #Diffuse body pain due to Myalgia vs Pleurisy from Covid Viral Infection vs Exarcerbation of Autoimmune Condition   #hx of HbSC Disease  PE ruled out  YAJAIRA neg, ds-DNA neg  Echocardio (07/02): Normal EF  PT continues to have diffuse pain. So far, workup was unrevealing of acute pathology. Her diffuse myalgias may be related to COVID infection  - Cont following pain management recommendations. Added duloxetine and flexeril for further pain control.   - Starting Tylenol 650mg q6h standing / oxycotin 20mg / oxycodone 5mg  - Holding cyclobenzaparine and gabapentin  - oxycontin (oxycodone ER) 20mg q12h PRN (prompt the patient if/when due).   - oxycodone 5mg q8h PRN for breakthrough      PAST MEDICAL & SURGICAL HISTORY:  Lupus      Sickle cell disease      Stroke      Pulmonary embolism      S/P cholecystectomy          Hospital Course:    TODAY'S SUBJECTIVE & REVIEW OF SYMPTOMS:     Constitutional WNL   Cardio chest pain    Resp WNL   GI WNL  Heme WNL  Endo WNL  Skin WNL  MSK Weakness   Neuro WNL  Cognitive WNL  Psych WNL      MEDICATIONS  (STANDING):  acetaminophen     Tablet .. 650 milliGRAM(s) Oral every 6 hours  apixaban 5 milliGRAM(s) Oral every 12 hours  DULoxetine 30 milliGRAM(s) Oral daily  folic acid 1 milliGRAM(s) Oral daily  mycophenolate mofetil 1500 milliGRAM(s) Oral two times a day  ondansetron   Disintegrating Tablet 4 milliGRAM(s) Oral once  pantoprazole    Tablet 40 milliGRAM(s) Oral before breakfast  polyethylene glycol 3350 17 Gram(s) Oral daily  senna 2 Tablet(s) Oral at bedtime    MEDICATIONS  (PRN):  artificial  tears Solution 1 Drop(s) Both EYES two times a day PRN Dry Eyes  oxycodone    5 mG/acetaminophen 325 mG 1 Tablet(s) Oral every 8 hours PRN Severe Pain (7 - 10)  oxyCODONE  ER Tablet 20 milliGRAM(s) Oral every 12 hours PRN COIVD myalgia/ CTD      FAMILY HISTORY:      Allergies    No Known Allergies    Intolerances        SOCIAL HISTORY:    [  ] Etoh  [  ] Smoking  [  ] Substance abuse     Home Environment:  [   ] Home Alone  [   ] Lives with Family  [   ] Home Health Aid    Dwelling:  [   ] Apartment  [   ] Private House  [   ] Adult Home  [   ] Skilled Nursing Facility      [ x  ] Short Term  [   ] Long Term  [   ] Stairs       Elevator [   ]    FUNCTIONAL STATUS PTA: (Check all that apply)  Ambulation: [    ]Independent    [ x  ] Dependent     [   ] Non-Ambulatory  Assistive Device: [   ] SA Cane  [   ]  Q Cane  [ x  ] Walker  [   ]  Wheelchair  ADL : [   ] Independent  [ x   ]  Dependent       Vital Signs Last 24 Hrs  T(C): 36.4 (10 Jul 2023 13:17), Max: 37.4 (09 Jul 2023 21:00)  T(F): 97.6 (10 Jul 2023 13:17), Max: 99.3 (09 Jul 2023 21:00)  HR: 86 (10 Jul 2023 13:17) (86 - 97)  BP: 117/62 (10 Jul 2023 13:17) (101/61 - 117/62)  BP(mean): --  RR: 18 (10 Jul 2023 13:17) (16 - 18)  SpO2: 98% (10 Jul 2023 13:17) (94% - 98%)    Parameters below as of 09 Jul 2023 21:00  Patient On (Oxygen Delivery Method): room air          PHYSICAL EXAM: Awake & Alert  GENERAL: NAD  HEAD:  Normocephalic  CHEST/LUNG: Clear   HEART: S1S2+  ABDOMEN: Soft, Nontender  EXTREMITIES:  no calf tenderness    NERVOUS SYSTEM:  Cranial Nerves 2-12 intact [   ] Abnormal  [   ]  ROM: WFL all extremities [ x  ]  Abnormal [   ]  Motor Strength: WFL all extremities  [  x ]  Abnormal [   ]  Sensation: intact to light touch [ x  ] Abnormal [   ]    FUNCTIONAL STATUS:  Bed Mobility: Independent [   ]  Supervision [   ]  Needs Assistance [  x ]  N/A [   ]  Transfers: Independent [   ]  Supervision [   ]  Needs Assistance [ x  ]  N/A [   ]   Ambulation: Independent [   ]  Supervision [   ]  Needs Assistance [  x ]  N/A [   ]  ADL: Independent [   ] Requires Assistance [   ] N/A [   ]      LABS:                RADIOLOGY & ADDITIONAL STUDIES:

## 2023-07-10 NOTE — CONSULT NOTE ADULT - CONSULT REQUESTED DATE/TIME
09-Jul-2023 13:16
29-Jun-2023 08:43
04-Jul-2023 15:14
10-Jul-2023 17:58
28-Jun-2023 08:57
28-Jun-2023 08:34

## 2023-07-10 NOTE — PROGRESS NOTE ADULT - SUBJECTIVE AND OBJECTIVE BOX
HPI  Patient is a 53y old Female who presents with a chief complaint of Chest pain (28 Jun 2023 08:33)    Currently admitted to medicine with the primary diagnosis of Acute pericardial effusion       Today is hospital day 13d.     INTERVAL HPI / OVERNIGHT EVENTS:  Patient was examined and seen at bedside. This morning she is resting comfortably in bed and reports no new issues or overnight events.     ROS: Otherwise unremarkable     PAST MEDICAL & SURGICAL HISTORY  Lupus    Sickle cell disease    Stroke    Pulmonary embolism    S/P cholecystectomy      ALLERGIES  No Known Allergies    MEDICATIONS  STANDING MEDICATIONS  acetaminophen     Tablet .. 650 milliGRAM(s) Oral every 6 hours  apixaban 5 milliGRAM(s) Oral every 12 hours  DULoxetine 30 milliGRAM(s) Oral daily  folic acid 1 milliGRAM(s) Oral daily  mycophenolate mofetil 1500 milliGRAM(s) Oral two times a day  pantoprazole    Tablet 40 milliGRAM(s) Oral before breakfast  polyethylene glycol 3350 17 Gram(s) Oral daily  senna 2 Tablet(s) Oral at bedtime    PRN MEDICATIONS  artificial  tears Solution 1 Drop(s) Both EYES two times a day PRN  oxycodone    5 mG/acetaminophen 325 mG 1 Tablet(s) Oral every 8 hours PRN  oxyCODONE  ER Tablet 20 milliGRAM(s) Oral every 12 hours PRN    VITALS:  T(F): 97.6  HR: 86  BP: 117/62  RR: 18  SpO2: 98%    PHYSICAL EXAM  GEN: NAD, Resting comfortably in bed  PULM: Clear to auscultation bilaterally, No wheezes  CVS: Regular rate and rhythm, S1-S2, no murmurs  ABD: Soft, non-tender, non-distended, no guarding  EXT: No edema  NEURO: A&Ox3, no focal deficits    LABS                        RADIOLOGY     HPI  Patient is a 41 yr old female with PMHx APLS, hx ofr stroke in 1992 with residual left sided weakness (on eliquis 5 mg BID), undifferentiated CTD, HbSC, hx of PE (on eliquis) presents presenting from rehab facility with chest pain, SOB, cough, headache since Saturday. History goes back to 03/23 where the patient felt weak and had abdominal pain and was admitted to [facility] turned out to have low Hb of 6.3. Patient was then discharged to [facility] with no clear diagnosis. 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 She saw again Dr. Gutierrez, who increased the Mycophenolate to 1500 mg BID. On Saturfay patient woke up with shortness of breath and chest tightness, and back pain. Pain is not exacerbated by exertion. Chest is tender to palpation and pain is more pronounced on deep inspiration. Patient reported severe SOB since saturday. Patient was supposed to be finishing rehab tomorrow and the Rehab center sent her to the ED. Patient denies any urinary symptoms, no palpitations, denies any GI symptoms except an episode of loose stools today morning. Patient was started on prednsione taper on Satur 30 mg PO OD and is now on 10 mg PO OD. Patient is on 2L NC. Patient examination is normal with no crackles, wheezing, murmur, rub, no sign of DVT.     Labs noticeable for WBC of 11.55, Hb 8.9, D-dimer 377, CRP 39, A mildly positive WBC 8     CT chest Angio showed Bibasilar dependent subsegmental areas of atelectasis and/or scarring.  Limited Echo in the ED showed: Small Pericardial Effusion, Decreased EF, RV dilation  EKG: NSR     Currently admitted to medicine with the primary diagnosis of Acute pericardial effusion       Today is hospital day 13d.     INTERVAL HPI / OVERNIGHT EVENTS:  Patient was examined and seen at bedside. This morning she is resting comfortably in bed and reports no new issues or overnight events. The pt reports shortness of breath and pain along the anterior chest wall and pain along the epigastric region. The pt endorses a bowel movement a day and states she ambulates to use the bathroom. She denies dysuria.    ROS: Otherwise unremarkable     PAST MEDICAL & SURGICAL HISTORY  Lupus    Sickle cell disease    Stroke    Pulmonary embolism    S/P cholecystectomy      ALLERGIES  No Known Allergies    MEDICATIONS  STANDING MEDICATIONS  acetaminophen     Tablet .. 650 milliGRAM(s) Oral every 6 hours  apixaban 5 milliGRAM(s) Oral every 12 hours  DULoxetine 30 milliGRAM(s) Oral daily  folic acid 1 milliGRAM(s) Oral daily  mycophenolate mofetil 1500 milliGRAM(s) Oral two times a day  pantoprazole    Tablet 40 milliGRAM(s) Oral before breakfast  polyethylene glycol 3350 17 Gram(s) Oral daily  senna 2 Tablet(s) Oral at bedtime    PRN MEDICATIONS  artificial  tears Solution 1 Drop(s) Both EYES two times a day PRN  oxycodone    5 mG/acetaminophen 325 mG 1 Tablet(s) Oral every 8 hours PRN  oxyCODONE  ER Tablet 20 milliGRAM(s) Oral every 12 hours PRN    VITALS:  T(F): 97.6  HR: 86  BP: 117/62  RR: 18  SpO2: 98%    PHYSICAL EXAM  GEN: NAD, Resting comfortably in bed  PULM: Clear to auscultation bilaterally, No wheezes  CVS: Regular rate and rhythm, S1-S2, no murmurs  ABD: Soft, non-tender, non-distended, no guarding  EXT: No edema  NEURO: A&Ox3, no focal deficits    LABS    CBC  Complete Blood Count + Automated Diff (07.08.23 @ 08:05)   WBC Count: 9.82 K/uL  RBC Count: 2.59 M/uL  Hemoglobin: 8.3 g/dL  Hematocrit: 23.3 %  Mean Cell Volume: 90.0 fL  Mean Cell Hemoglobin: 32.0 pg  Mean Cell Hemoglobin Conc: 35.6 g/dL  Red Cell Distrib Width: 13.9 %  Platelet Count - Automated: 309 K/uL  MPV: 9.7 fL  Auto Neutrophil #: 7.17 K/uL  Auto Lymphocyte #: 1.53 K/uL  Auto Monocyte #: 0.94 K/uL  Auto Eosinophil #: 0.09 K/uL  Auto Basophil #: 0.00 K/uL  Auto Neutrophil %: 73.0: Differential percentages must be correlated with absolute numbers for   clinical significance. %  Auto Lymphocyte %: 15.6 %  Auto Monocyte %: 9.6 %  Auto Eosinophil %: 0.9 %  Auto Basophil %: 0.0 %  Nucleated RBC: NA: Manual NRBC performed. /100 WBC    CMP  Comprehensive Metabolic Panel (07.08.23 @ 08:05)   Sodium: 139 mmol/L  Potassium: 3.8 mmol/L  Chloride: 102 mmol/L  Carbon Dioxide: 27 mmol/L  Anion Gap: 10 mmol/L  Blood Urea Nitrogen: 6 mg/dL  Creatinine: 0.6 mg/dL  Glucose: 97 mg/dL  Calcium: 8.4 mg/dL  Protein Total: 5.4 g/dL  Albumin: 3.1 g/dL  Bilirubin Total: 1.0 mg/dL  Alkaline Phosphatase: 76 U/L  Aspartate Aminotransferase (AST/SGOT): 16 U/L  Alanine Aminotransferase (ALT/SGPT): 14 U/L  eGFR: 107:    Mg    Magnesium: 1.8 mg/dL (07.08.23 @ 08:05)   Magnesium: 1.7 mg/dL (07.07.23 @ 07:01)   Magnesium: 1.9 mg/dL (07.05.23 @ 07:48)   Magnesium: 1.7 mg/dL (07.04.23 @ 07:57)   Magnesium: 1.8 mg/dL (07.03.23 @ 06:08)     Inflammatory Markers  ESR: Sedimentation Rate, Erythrocyte (07.07.23 @ 07:01)  CRP: C-Reactive Protein, Serum (07.07.23 @ 07:01)     Autoimmune workup  Anti-Nuclear Antibody in AM (07.05.23 @ 07:48)   Anti Nuclear Factor Titer: Negative: Antinuclear AB (YAJAIRA), IFA Method    DNA(ds) Ab, Crithidia w/reflex (07.05.23 @ 07:48)   DNA AB CRITH: Negative: Performed At: [facility]    LYLE Antibody Screening Test (07.05.23 @ 07:48)   Anti-Ribonuclear Protein: <0.2: Fluorescent Bead Immunoassy   Reference Ranges for RNP and SM:   <1.0 AI (negative)   > or =1.0 AI (positive)     Anti-Ribonuclear Protein: <0.2: Fluorescent Bead Immunoassy   Reference Ranges for RNP and SM:   <1.0 AI (negative)   > or =1.0 AI (positive)     SM (Smith) Ab FBIA: <0.2 AI (07.05.23 @ 07:48)     Anticardiolipin Antibody Level, Total: Negative: Method: EIA (07.05.23 @ 07:48)    Beta 2 Glycoprotein 1 Antibody Screen: Negative (07.05.23 @ 07:48)         RADIOLOGY   XR CHEST PORTABLE IMMED 1V (7/8/23)  Impression:    No radiographic evidence of acute cardiopulmonarydisease.    CT ANGIO OF ABDOMEN AND PELVIS (7/7/23)   IMPRESSION:    Since 2021    No evidence of vascular abnormality/mesenteric ischemia    Moderate amount of stool in the colon may reflect evidence of   constipation.    Interval development of L5 superior endplate Schmorl's node. In addition,   there is interval development of vertical sclerotic lines traversing the   anterior aspect of L3, L4 and L5 as well as S1 vertebra. These findings   may reflect sequela of unusual stress    --- End of Report ---    CT ANGIO OF CHEST (6/27/23)  IMPRESSION:  1.  No evidence of acute pulmonary embolus or acute thoracic pathology.    ---End of Report ---

## 2023-07-11 VITALS
RESPIRATION RATE: 18 BRPM | OXYGEN SATURATION: 97 % | DIASTOLIC BLOOD PRESSURE: 64 MMHG | SYSTOLIC BLOOD PRESSURE: 117 MMHG | TEMPERATURE: 99 F | HEART RATE: 102 BPM

## 2023-07-11 PROCEDURE — 99239 HOSP IP/OBS DSCHRG MGMT >30: CPT

## 2023-07-11 RX ORDER — PANTOPRAZOLE SODIUM 20 MG/1
1 TABLET, DELAYED RELEASE ORAL
Qty: 0 | Refills: 0 | DISCHARGE
Start: 2023-07-11

## 2023-07-11 RX ORDER — OXYCODONE AND ACETAMINOPHEN 5; 325 MG/1; MG/1
1 TABLET ORAL
Qty: 0 | Refills: 0 | DISCHARGE
Start: 2023-07-11

## 2023-07-11 RX ORDER — DULOXETINE HYDROCHLORIDE 30 MG/1
1 CAPSULE, DELAYED RELEASE ORAL
Qty: 30 | Refills: 0
Start: 2023-07-11 | End: 2023-08-09

## 2023-07-11 RX ORDER — ACETAMINOPHEN 500 MG
2 TABLET ORAL
Qty: 112 | Refills: 0
Start: 2023-07-11 | End: 2023-07-24

## 2023-07-11 RX ORDER — OXYCODONE HYDROCHLORIDE 5 MG/1
1 TABLET ORAL
Qty: 0 | Refills: 0 | DISCHARGE
Start: 2023-07-11

## 2023-07-11 RX ORDER — SENNA PLUS 8.6 MG/1
2 TABLET ORAL
Qty: 60 | Refills: 0
Start: 2023-07-11 | End: 2023-08-09

## 2023-07-11 RX ORDER — POLYETHYLENE GLYCOL 3350 17 G/17G
17 POWDER, FOR SOLUTION ORAL
Qty: 510 | Refills: 0
Start: 2023-07-11 | End: 2023-08-09

## 2023-07-11 RX ADMIN — MYCOPHENOLATE MOFETIL 1500 MILLIGRAM(S): 250 CAPSULE ORAL at 17:39

## 2023-07-11 RX ADMIN — Medication 650 MILLIGRAM(S): at 13:29

## 2023-07-11 RX ADMIN — APIXABAN 5 MILLIGRAM(S): 2.5 TABLET, FILM COATED ORAL at 05:17

## 2023-07-11 RX ADMIN — Medication 1 MILLIGRAM(S): at 12:29

## 2023-07-11 RX ADMIN — Medication 650 MILLIGRAM(S): at 18:39

## 2023-07-11 RX ADMIN — Medication 650 MILLIGRAM(S): at 17:39

## 2023-07-11 RX ADMIN — PANTOPRAZOLE SODIUM 40 MILLIGRAM(S): 20 TABLET, DELAYED RELEASE ORAL at 05:17

## 2023-07-11 RX ADMIN — Medication 650 MILLIGRAM(S): at 06:36

## 2023-07-11 RX ADMIN — MYCOPHENOLATE MOFETIL 1500 MILLIGRAM(S): 250 CAPSULE ORAL at 05:17

## 2023-07-11 RX ADMIN — DULOXETINE HYDROCHLORIDE 30 MILLIGRAM(S): 30 CAPSULE, DELAYED RELEASE ORAL at 12:29

## 2023-07-11 RX ADMIN — APIXABAN 5 MILLIGRAM(S): 2.5 TABLET, FILM COATED ORAL at 17:39

## 2023-07-11 RX ADMIN — Medication 650 MILLIGRAM(S): at 12:29

## 2023-07-11 RX ADMIN — Medication 650 MILLIGRAM(S): at 05:17

## 2023-07-11 NOTE — PROGRESS NOTE ADULT - ASSESSMENT
53 yr old female with PMHx APLS, hx ofr stroke in 1992 with residual left sided weakness, undifferentiated CTD, HbSC, hx of PE (on eliquis 5 mg BID) presents presenting from rehab facility with chest pain, SOB, cough, headache. Found to have COVID+. Admitted for further management    #Atypical Chest Pain constantly present anterior chest and posterior   -> Recent Covid Infection (CXR/TTE NL)   -> Pt did not improve with colchicine rx   -> Pain Management recs appreciated and ordered     #Diffuse body pain due to Myalgia vs Pleurisy from Covid Viral Infection vs Exarcerbation of Autoimmune Condition   #hx of HbSC Disease  PE ruled out  YAJAIRA neg, ds-DNA neg  Echocardio (07/02): Normal EF  Pt continues to have diffuse pain. So far, workup was unrevealing of acute pathology. Her diffuse myalgias may be related to COVID infection  - Cont following pain management recommendations. Added duloxetine and flexeril for further pain control.   - Starting Tylenol 650mg q6h standing / oxycotin 20mg / oxycodone 5mg  - Holding cyclobenzaparine and gabapentin  - oxycontin (oxycodone ER) 20mg q12h PRN (prompt the patient if/when due).   - oxycodone 5mg q8h PRN for breakthrough  - Cont IVF   - PT Eval - Formal physical therapy and encourage specific daily / weekly functional goals  - Physiatry: Pt advised to perform Bedside Pt 3-5x a week and set to STR  Bowel regimen: miralax   - DC colchicine 0.6 mg BID  - Can DC telemetry    #Nausea/Vomiting/Constipation  Possibly related to celecoxib. Improved after stopping med  r/o mesenteric ischemia  - f/u CT Angio abdomen/pelvis -> no ischemic disease, sclerotic lesions of L3-L5 due to stress, constipation in stool  - Symptomatic Tx w/ zofran standing  - Started bowel regimen -> miralax    #COVID+   CT chest Angio showed Bibasilar dependent subsegmental areas of atelectasis and/or scarring.  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 Overall suspicion for autoimmune disease causing her symptoms are low  Pt was previously told she had undifferentiated CTD. 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 Unclear how dx was made as her workup here has been negative She has been on mycophenolate outpatient  - Cont MFF 1500mg po bid - mycophenolate levels in lab  - Hold gabapentin  - Resend ESR/CRP  - pain control , increase oxycodone to 10mg prn for severe pain    #Normocytic anemia slightly worse than baseline HbSC/ Hx of stroke /hx of PE   * no signs of bleeding   - cw  eliquis     #stroke in 1992 with residual left sided weakness (on eliquis 5 mg BID),  #hx of PE (on eliquis)   - resume home meds    #DVT/GI px    #Dispo  - Came from SNF and used all hours  - STR - Discharged to [facility]

## 2023-07-11 NOTE — PROGRESS NOTE ADULT - PROVIDER SPECIALTY LIST ADULT
Hospitalist
Internal Medicine
Hospitalist
Hospitalist
Internal Medicine
Hospitalist

## 2023-07-11 NOTE — PROGRESS NOTE ADULT - SUBJECTIVE AND OBJECTIVE BOX
HPI  Patient is a 41 yr old female with PMHx APLS, hx ofr stroke in 1992 with residual left sided weakness (on eliquis 5 mg BID), undifferentiated CTD, HbSC, hx of PE (on eliquis) presents presenting from rehab facility with chest pain, SOB, cough, headache since Saturday. History goes back to 03/23 where the patient felt weak and had abdominal pain and was admitted to [facility] turned out to have low Hb of 6.3. Patient was then discharged to [facility] with no clear diagnosis. 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 She saw again Dr. Gutierrez, who increased the Mycophenolate to 1500 mg BID. On Saturfay patient woke up with shortness of breath and chest tightness, and back pain. Pain is not exacerbated by exertion. Chest is tender to palpation and pain is more pronounced on deep inspiration. Patient reported severe SOB since saturday. Patient was supposed to be finishing rehab tomorrow and the Rehab center sent her to the ED. Patient denies any urinary symptoms, no palpitations, denies any GI symptoms except an episode of loose stools today morning. Patient was started on prednsione taper on Satur 30 mg PO OD and is now on 10 mg PO OD. Patient is on 2L NC. Patient examination is normal with no crackles, wheezing, murmur, rub, no sign of DVT.     Labs noticeable for WBC of 11.55, Hb 8.9, D-dimer 377, CRP 39, A mildly positive WBC 8     CT chest Angio showed Bibasilar dependent subsegmental areas of atelectasis and/or scarring.  Limited Echo in the ED showed: Small Pericardial Effusion, Decreased EF, RV dilation  EKG: NSR     Currently admitted to medicine with the primary diagnosis of Acute pericardial effusion       Today is hospital day 13d.     INTERVAL HPI / OVERNIGHT EVENTS:  Patient was examined and seen at bedside. This morning she is resting comfortably in bed and reports no new issues or overnight events. The pt reports shortness of breath and pain along the anterior chest wall and pain along the epigastric region. The pt endorses a bowel movement a day and states she ambulates to use the bathroom. She denies dysuria.    ROS: Otherwise unremarkable     PAST MEDICAL & SURGICAL HISTORY  Lupus    Sickle cell disease    Stroke    Pulmonary embolism    S/P cholecystectomy      ALLERGIES  No Known Allergies    MEDICATIONS  STANDING MEDICATIONS  acetaminophen     Tablet .. 650 milliGRAM(s) Oral every 6 hours  apixaban 5 milliGRAM(s) Oral every 12 hours  DULoxetine 30 milliGRAM(s) Oral daily  folic acid 1 milliGRAM(s) Oral daily  mycophenolate mofetil 1500 milliGRAM(s) Oral two times a day  pantoprazole    Tablet 40 milliGRAM(s) Oral before breakfast  polyethylene glycol 3350 17 Gram(s) Oral daily  senna 2 Tablet(s) Oral at bedtime    PRN MEDICATIONS  artificial  tears Solution 1 Drop(s) Both EYES two times a day PRN  oxycodone    5 mG/acetaminophen 325 mG 1 Tablet(s) Oral every 8 hours PRN  oxyCODONE  ER Tablet 20 milliGRAM(s) Oral every 12 hours PRN    VITALS:  T(F): 97.6  HR: 86  BP: 117/62  RR: 18  SpO2: 98%    PHYSICAL EXAM  GEN: NAD, Resting comfortably in bed  PULM: Clear to auscultation bilaterally, No wheezes  CVS: Regular rate and rhythm, S1-S2, no murmurs  ABD: Soft, non-tender, non-distended, no guarding  EXT: No edema  NEURO: A&Ox3, no focal deficits    LABS    CBC  Complete Blood Count + Automated Diff (07.08.23 @ 08:05)   WBC Count: 9.82 K/uL  RBC Count: 2.59 M/uL  Hemoglobin: 8.3 g/dL  Hematocrit: 23.3 %  Mean Cell Volume: 90.0 fL  Mean Cell Hemoglobin: 32.0 pg  Mean Cell Hemoglobin Conc: 35.6 g/dL  Red Cell Distrib Width: 13.9 %  Platelet Count - Automated: 309 K/uL  MPV: 9.7 fL  Auto Neutrophil #: 7.17 K/uL  Auto Lymphocyte #: 1.53 K/uL  Auto Monocyte #: 0.94 K/uL  Auto Eosinophil #: 0.09 K/uL  Auto Basophil #: 0.00 K/uL  Auto Neutrophil %: 73.0: Differential percentages must be correlated with absolute numbers for   clinical significance. %  Auto Lymphocyte %: 15.6 %  Auto Monocyte %: 9.6 %  Auto Eosinophil %: 0.9 %  Auto Basophil %: 0.0 %  Nucleated RBC: NA: Manual NRBC performed. /100 WBC    CMP  Comprehensive Metabolic Panel (07.08.23 @ 08:05)   Sodium: 139 mmol/L  Potassium: 3.8 mmol/L  Chloride: 102 mmol/L  Carbon Dioxide: 27 mmol/L  Anion Gap: 10 mmol/L  Blood Urea Nitrogen: 6 mg/dL  Creatinine: 0.6 mg/dL  Glucose: 97 mg/dL  Calcium: 8.4 mg/dL  Protein Total: 5.4 g/dL  Albumin: 3.1 g/dL  Bilirubin Total: 1.0 mg/dL  Alkaline Phosphatase: 76 U/L  Aspartate Aminotransferase (AST/SGOT): 16 U/L  Alanine Aminotransferase (ALT/SGPT): 14 U/L  eGFR: 107:    Mg    Magnesium: 1.8 mg/dL (07.08.23 @ 08:05)   Magnesium: 1.7 mg/dL (07.07.23 @ 07:01)   Magnesium: 1.9 mg/dL (07.05.23 @ 07:48)   Magnesium: 1.7 mg/dL (07.04.23 @ 07:57)   Magnesium: 1.8 mg/dL (07.03.23 @ 06:08)     Inflammatory Markers  ESR: Sedimentation Rate, Erythrocyte (07.07.23 @ 07:01)  CRP: C-Reactive Protein, Serum (07.07.23 @ 07:01)     Autoimmune workup  Anti-Nuclear Antibody in AM (07.05.23 @ 07:48)   Anti Nuclear Factor Titer: Negative: Antinuclear AB (YAJAIRA), IFA Method    DNA(ds) Ab, Crithidia w/reflex (07.05.23 @ 07:48)   DNA AB CRITH: Negative: Performed At: [facility]    LYLE Antibody Screening Test (07.05.23 @ 07:48)   Anti-Ribonuclear Protein: <0.2: Fluorescent Bead Immunoassy   Reference Ranges for RNP and SM:   <1.0 AI (negative)   > or =1.0 AI (positive)     Anti-Ribonuclear Protein: <0.2: Fluorescent Bead Immunoassy   Reference Ranges for RNP and SM:   <1.0 AI (negative)   > or =1.0 AI (positive)     SM (Smith) Ab FBIA: <0.2 AI (07.05.23 @ 07:48)     Anticardiolipin Antibody Level, Total: Negative: Method: EIA (07.05.23 @ 07:48)    Beta 2 Glycoprotein 1 Antibody Screen: Negative (07.05.23 @ 07:48)         RADIOLOGY   XR CHEST PORTABLE IMMED 1V (7/8/23)  Impression:    No radiographic evidence of acute cardiopulmonarydisease.    CT ANGIO OF ABDOMEN AND PELVIS (7/7/23)   IMPRESSION:    Since 2021    No evidence of vascular abnormality/mesenteric ischemia    Moderate amount of stool in the colon may reflect evidence of   constipation.    Interval development of L5 superior endplate Schmorl's node. In addition,   there is interval development of vertical sclerotic lines traversing the   anterior aspect of L3, L4 and L5 as well as S1 vertebra. These findings   may reflect sequela of unusual stress    --- End of Report ---    CT ANGIO OF CHEST (6/27/23)  IMPRESSION:  1.  No evidence of acute pulmonary embolus or acute thoracic pathology.    ---End of Report --- HPI  Patient is a 41 yr old female with PMHx APLS, hx ofr stroke in 1992 with residual left sided weakness (on eliquis 5 mg BID), undifferentiated CTD, HbSC, hx of PE (on eliquis) presents presenting from rehab facility with chest pain, SOB, cough, headache since Saturday. History goes back to 03/23 where the patient felt weak and had abdominal pain and was admitted to [facility] turned out to have low Hb of 6.3. Patient was then discharged to [place] rehab with no clear diagnosis. 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 She saw again Dr. Gutierrez, who increased the Mycophenolate to 1500 mg BID. On Saturfay patient woke up with shortness of breath and chest tightness, and back pain. Pain is not exacerbated by exertion. Chest is tender to palpation and pain is more pronounced on deep inspiration. Patient reported severe SOB since saturday. Patient was supposed to be finishing rehab tomorrow and the Rehab center sent her to the ED. Patient denies any urinary symptoms, no palpitations, denies any GI symptoms except an episode of loose stools today morning. Patient was started on prednsione taper on Satur 30 mg PO OD and is now on 10 mg PO OD. Patient is on 2L NC. Patient examination is normal with no crackles, wheezing, murmur, rub, no sign of DVT.     Labs noticeable for WBC of 11.55, Hb 8.9, D-dimer 377, CRP 39, A mildly positive WBC 8     CT chest Angio showed Bibasilar dependent subsegmental areas of atelectasis and/or scarring.  Limited Echo in the ED showed: Small Pericardial Effusion, Decreased EF, RV dilation  EKG: NSR     Currently admitted to medicine with the primary diagnosis of Acute pericardial effusion       Today is hospital day 13d.     INTERVAL HPI / OVERNIGHT EVENTS:  Patient was examined and seen at bedside. This morning she is resting comfortably in bed and reports no new issues or overnight events. The pt reports shortness of breath and pain along the anterior chest wall and pain along the epigastric region.    ROS: Otherwise unremarkable     PAST MEDICAL & SURGICAL HISTORY  Lupus    Sickle cell disease    Stroke    Pulmonary embolism    S/P cholecystectomy      ALLERGIES  No Known Allergies    MEDICATIONS  STANDING MEDICATIONS  acetaminophen     Tablet .. 650 milliGRAM(s) Oral every 6 hours  apixaban 5 milliGRAM(s) Oral every 12 hours  DULoxetine 30 milliGRAM(s) Oral daily  folic acid 1 milliGRAM(s) Oral daily  mycophenolate mofetil 1500 milliGRAM(s) Oral two times a day  pantoprazole    Tablet 40 milliGRAM(s) Oral before breakfast  polyethylene glycol 3350 17 Gram(s) Oral daily  senna 2 Tablet(s) Oral at bedtime    PRN MEDICATIONS  artificial  tears Solution 1 Drop(s) Both EYES two times a day PRN  oxycodone    5 mG/acetaminophen 325 mG 1 Tablet(s) Oral every 8 hours PRN  oxyCODONE  ER Tablet 20 milliGRAM(s) Oral every 12 hours PRN    VITALS:  T(F): 97.6  HR: 86  BP: 117/62  RR: 18  SpO2: 98%    PHYSICAL EXAM  GEN: NAD, Resting comfortably in bed  PULM: Clear to auscultation bilaterally, No wheezes  CVS: Regular rate and rhythm, S1-S2, no murmurs  ABD: Soft, non-tender, non-distended, no guarding  EXT: No edema  NEURO: A&Ox3, no focal deficits    LABS    CBC  Complete Blood Count + Automated Diff (07.08.23 @ 08:05)   WBC Count: 9.82 K/uL  RBC Count: 2.59 M/uL  Hemoglobin: 8.3 g/dL  Hematocrit: 23.3 %  Mean Cell Volume: 90.0 fL  Mean Cell Hemoglobin: 32.0 pg  Mean Cell Hemoglobin Conc: 35.6 g/dL  Red Cell Distrib Width: 13.9 %  Platelet Count - Automated: 309 K/uL  MPV: 9.7 fL  Auto Neutrophil #: 7.17 K/uL  Auto Lymphocyte #: 1.53 K/uL  Auto Monocyte #: 0.94 K/uL  Auto Eosinophil #: 0.09 K/uL  Auto Basophil #: 0.00 K/uL  Auto Neutrophil %: 73.0: Differential percentages must be correlated with absolute numbers for   clinical significance. %  Auto Lymphocyte %: 15.6 %  Auto Monocyte %: 9.6 %  Auto Eosinophil %: 0.9 %  Auto Basophil %: 0.0 %  Nucleated RBC: NA: Manual NRBC performed. /100 WBC    CMP  Comprehensive Metabolic Panel (07.08.23 @ 08:05)   Sodium: 139 mmol/L  Potassium: 3.8 mmol/L  Chloride: 102 mmol/L  Carbon Dioxide: 27 mmol/L  Anion Gap: 10 mmol/L  Blood Urea Nitrogen: 6 mg/dL  Creatinine: 0.6 mg/dL  Glucose: 97 mg/dL  Calcium: 8.4 mg/dL  Protein Total: 5.4 g/dL  Albumin: 3.1 g/dL  Bilirubin Total: 1.0 mg/dL  Alkaline Phosphatase: 76 U/L  Aspartate Aminotransferase (AST/SGOT): 16 U/L  Alanine Aminotransferase (ALT/SGPT): 14 U/L  eGFR: 107:    Mg    Magnesium: 1.8 mg/dL (07.08.23 @ 08:05)   Magnesium: 1.7 mg/dL (07.07.23 @ 07:01)   Magnesium: 1.9 mg/dL (07.05.23 @ 07:48)   Magnesium: 1.7 mg/dL (07.04.23 @ 07:57)   Magnesium: 1.8 mg/dL (07.03.23 @ 06:08)     Inflammatory Markers  ESR: Sedimentation Rate, Erythrocyte (07.07.23 @ 07:01)  CRP: C-Reactive Protein, Serum (07.07.23 @ 07:01)     Autoimmune workup  Anti-Nuclear Antibody in AM (07.05.23 @ 07:48)   Anti Nuclear Factor Titer: Negative: Antinuclear AB (YAJAIRA), IFA Method    DNA(ds) Ab, Crithidia w/reflex (07.05.23 @ 07:48)   DNA AB CRITH: Negative: Performed At: [facility]    LYLE Antibody Screening Test (07.05.23 @ 07:48)   Anti-Ribonuclear Protein: <0.2: Fluorescent Bead Immunoassy   Reference Ranges for RNP and SM:   <1.0 AI (negative)   > or =1.0 AI (positive)     Anti-Ribonuclear Protein: <0.2: Fluorescent Bead Immunoassy   Reference Ranges for RNP and SM:   <1.0 AI (negative)   > or =1.0 AI (positive)     SM (Smith) Ab FBIA: <0.2 AI (07.05.23 @ 07:48)     Anticardiolipin Antibody Level, Total: Negative: Method: EIA (07.05.23 @ 07:48)    Beta 2 Glycoprotein 1 Antibody Screen: Negative (07.05.23 @ 07:48)         RADIOLOGY   XR CHEST PORTABLE IMMED 1V (7/8/23)  Impression:    No radiographic evidence of acute cardiopulmonarydisease.    CT ANGIO OF ABDOMEN AND PELVIS (7/7/23)   IMPRESSION:    Since 2021    No evidence of vascular abnormality/mesenteric ischemia    Moderate amount of stool in the colon may reflect evidence of   constipation.    Interval development of L5 superior endplate Schmorl's node. In addition,   there is interval development of vertical sclerotic lines traversing the   anterior aspect of L3, L4 and L5 as well as S1 vertebra. These findings   may reflect sequela of unusual stress    --- End of Report ---    CT ANGIO OF CHEST (6/27/23)  IMPRESSION:  1.  No evidence of acute pulmonary embolus or acute thoracic pathology.    ---End of Report --- HPI  Patient is a 41 yr old female with PMHx APLS, hx ofr stroke in 1992 with residual left sided weakness (on eliquis 5 mg BID), undifferentiated CTD, HbSC, hx of PE (on eliquis) presents presenting from rehab facility with chest pain, SOB, cough, headache since Saturday. History goes back to 03/23 where the patient felt weak and had abdominal pain and was admitted to [facility] turned out to have low Hb of 6.3. Patient was then discharged to [place] rehab with no clear diagnosis. 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 She saw again Dr. Gutierrez, who increased the Mycophenolate to 1500 mg BID. On Saturfay patient woke up with shortness of breath and chest tightness, and back pain. Pain is not exacerbated by exertion. Chest is tender to palpation and pain is more pronounced on deep inspiration. Patient reported severe SOB since saturday. Patient was supposed to be finishing rehab tomorrow and the Rehab center sent her to the ED. Patient denies any urinary symptoms, no palpitations, denies any GI symptoms except an episode of loose stools today morning. Patient was started on prednsione taper on Satur 30 mg PO OD and is now on 10 mg PO OD. Patient is on 2L NC. Patient examination is normal with no crackles, wheezing, murmur, rub, no sign of DVT.     Labs noticeable for WBC of 11.55, Hb 8.9, D-dimer 377, CRP 39, A mildly positive WBC 8     CT chest Angio showed Bibasilar dependent subsegmental areas of atelectasis and/or scarring.  Limited Echo in the ED showed: Small Pericardial Effusion, Decreased EF, RV dilation  EKG: NSR     Currently admitted to medicine with the primary diagnosis of Acute pericardial effusion       Today is hospital day 14d.     INTERVAL HPI / OVERNIGHT EVENTS:  Patient was examined and seen at bedside. This morning she is resting comfortably in bed and reports no new issues or overnight events. The pt reports shortness of breath and pain along the anterior chest wall and pain along the epigastric region.    ROS: Otherwise unremarkable     PAST MEDICAL & SURGICAL HISTORY  Lupus    Sickle cell disease    Stroke    Pulmonary embolism    S/P cholecystectomy      ALLERGIES  No Known Allergies    MEDICATIONS  STANDING MEDICATIONS  acetaminophen     Tablet .. 650 milliGRAM(s) Oral every 6 hours  apixaban 5 milliGRAM(s) Oral every 12 hours  DULoxetine 30 milliGRAM(s) Oral daily  folic acid 1 milliGRAM(s) Oral daily  mycophenolate mofetil 1500 milliGRAM(s) Oral two times a day  pantoprazole    Tablet 40 milliGRAM(s) Oral before breakfast  polyethylene glycol 3350 17 Gram(s) Oral daily  senna 2 Tablet(s) Oral at bedtime    PRN MEDICATIONS  artificial  tears Solution 1 Drop(s) Both EYES two times a day PRN  oxycodone    5 mG/acetaminophen 325 mG 1 Tablet(s) Oral every 8 hours PRN  oxyCODONE  ER Tablet 20 milliGRAM(s) Oral every 12 hours PRN    VITALS:  T(F): 97.6  HR: 86  BP: 117/62  RR: 18  SpO2: 98%    PHYSICAL EXAM  GEN: NAD, Resting comfortably in bed  PULM: Clear to auscultation bilaterally, No wheezes  CVS: Regular rate and rhythm, S1-S2, no murmurs  ABD: Soft, non-tender, non-distended, no guarding  EXT: No edema  NEURO: A&Ox3, no focal deficits    LABS    CBC  Complete Blood Count + Automated Diff (07.08.23 @ 08:05)   WBC Count: 9.82 K/uL  RBC Count: 2.59 M/uL  Hemoglobin: 8.3 g/dL  Hematocrit: 23.3 %  Mean Cell Volume: 90.0 fL  Mean Cell Hemoglobin: 32.0 pg  Mean Cell Hemoglobin Conc: 35.6 g/dL  Red Cell Distrib Width: 13.9 %  Platelet Count - Automated: 309 K/uL  MPV: 9.7 fL  Auto Neutrophil #: 7.17 K/uL  Auto Lymphocyte #: 1.53 K/uL  Auto Monocyte #: 0.94 K/uL  Auto Eosinophil #: 0.09 K/uL  Auto Basophil #: 0.00 K/uL  Auto Neutrophil %: 73.0: Differential percentages must be correlated with absolute numbers for   clinical significance. %  Auto Lymphocyte %: 15.6 %  Auto Monocyte %: 9.6 %  Auto Eosinophil %: 0.9 %  Auto Basophil %: 0.0 %  Nucleated RBC: NA: Manual NRBC performed. /100 WBC    CMP  Comprehensive Metabolic Panel (07.08.23 @ 08:05)   Sodium: 139 mmol/L  Potassium: 3.8 mmol/L  Chloride: 102 mmol/L  Carbon Dioxide: 27 mmol/L  Anion Gap: 10 mmol/L  Blood Urea Nitrogen: 6 mg/dL  Creatinine: 0.6 mg/dL  Glucose: 97 mg/dL  Calcium: 8.4 mg/dL  Protein Total: 5.4 g/dL  Albumin: 3.1 g/dL  Bilirubin Total: 1.0 mg/dL  Alkaline Phosphatase: 76 U/L  Aspartate Aminotransferase (AST/SGOT): 16 U/L  Alanine Aminotransferase (ALT/SGPT): 14 U/L  eGFR: 107:    Mg    Magnesium: 1.8 mg/dL (07.08.23 @ 08:05)   Magnesium: 1.7 mg/dL (07.07.23 @ 07:01)   Magnesium: 1.9 mg/dL (07.05.23 @ 07:48)   Magnesium: 1.7 mg/dL (07.04.23 @ 07:57)   Magnesium: 1.8 mg/dL (07.03.23 @ 06:08)     Inflammatory Markers  ESR: Sedimentation Rate, Erythrocyte (07.07.23 @ 07:01)  CRP: C-Reactive Protein, Serum (07.07.23 @ 07:01)     Autoimmune workup  Anti-Nuclear Antibody in AM (07.05.23 @ 07:48)   Anti Nuclear Factor Titer: Negative: Antinuclear AB (YAJAIRA), IFA Method    DNA(ds) Ab, Crithidia w/reflex (07.05.23 @ 07:48)   DNA AB CRITH: Negative: Performed At: [facility]    LYLE Antibody Screening Test (07.05.23 @ 07:48)   Anti-Ribonuclear Protein: <0.2: Fluorescent Bead Immunoassy   Reference Ranges for RNP and SM:   <1.0 AI (negative)   > or =1.0 AI (positive)     Anti-Ribonuclear Protein: <0.2: Fluorescent Bead Immunoassy   Reference Ranges for RNP and SM:   <1.0 AI (negative)   > or =1.0 AI (positive)     SM (Smith) Ab FBIA: <0.2 AI (07.05.23 @ 07:48)     Anticardiolipin Antibody Level, Total: Negative: Method: EIA (07.05.23 @ 07:48)    Beta 2 Glycoprotein 1 Antibody Screen: Negative (07.05.23 @ 07:48)         RADIOLOGY   XR CHEST PORTABLE IMMED 1V (7/8/23)  Impression:    No radiographic evidence of acute cardiopulmonarydisease.    CT ANGIO OF ABDOMEN AND PELVIS (7/7/23)   IMPRESSION:    Since 2021    No evidence of vascular abnormality/mesenteric ischemia    Moderate amount of stool in the colon may reflect evidence of   constipation.    Interval development of L5 superior endplate Schmorl's node. In addition,   there is interval development of vertical sclerotic lines traversing the   anterior aspect of L3, L4 and L5 as well as S1 vertebra. These findings   may reflect sequela of unusual stress    --- End of Report ---    CT ANGIO OF CHEST (6/27/23)  IMPRESSION:  1.  No evidence of acute pulmonary embolus or acute thoracic pathology.    ---End of Report ---

## 2023-10-01 PROBLEM — I63.81 LACUNAR INFARCTION: Status: RESOLVED | Noted: 2017-01-17 | Resolved: 2023-10-01

## 2023-12-29 NOTE — PHYSICAL THERAPY INITIAL EVALUATION ADULT - LEVEL OF INDEPENDENCE: SIT/SUPINE, REHAB EVAL
PATIENT WAS SEEN YESTERDAY.  SINCE SHE WAS SEEN YESTERDAY, SHE SAID THERE IS MORE TIGHTNESS IN HER LEFT ARM, AND MORE FLUID BUILD UP.  SHE HAS TIGHTNESS AT THE INCISION SITE, TOWARD THE LEFT ARM, WHERE LYMPH NODES WERE REMOVED.  SHE SAID IT MAKES HER THINK IT IS NOT DRAINING RIGHT. SHE SAID SHE HAS LYMPHEDEMA.    PATIENT SAID IN 2022, SHE HAD BILATERAL MASTECTOMIES REDONE AND IMPLANTS REDONE.  SHE HAD LYMPHEDEMA OF LEFT ARM AND UNDERWENT SEVERAL WEEKS OF LYMPHEDEMA THERAPY.  AT THAT TIME, SHE SAID THE THERAPIST TOLD HER SHE THOUGHT SHE MAY HAVE SOME CHORDING.    PATIENT WANTS TO KNOW IF SHE NEEDS TO COME IN NEXT WEEK OR WHAT SHE NEEDS TO DO.     pt left OOB in chair

## 2024-06-21 NOTE — PROGRESS NOTE ADULT - PROBLEM SELECTOR PLAN 5
On day of surgery, do not take:    LOSARTAN  HYDROCHOLOROTHIAZIDE  
Pain management for pack pain as above     She has SC disease, sickle trait. Labs repeated, show mild anemia  IV Fluids  Hematology FU noted  Add folic acid  PRBC transfusion in progress
Pain management for pack pain as above   IV Fluidsstopped
Pain management for pack pain as above     She has SC disease, sickle trait. Labs repeated, show mild anemia  IV Fluids  Hematology, Dr Garzon called  Add folic acid
Chronic stable  Pain management for pack pain as above   No symptoms suggesting crisis  She has SC disease, sicle trait. Labs repeated, show mild anemia
Pain management for pack pain as above     She has SC disease, sickle trait. Labs repeated, show mild anemia  IV Fluids  Hematology FU noted  Add folic acid  PRBC transfusion in progress
Pain management for pack pain as above   IV Fluids
Chronic stable  Pain management for pack pain as above   No symptoms suggesting crisis
Pain management for pack pain as above     She has SC disease, sickle trait. Labs repeated, show mild anemia  IV Fluids
Pain management for pack pain as above   IV Fluids
